# Patient Record
Sex: FEMALE | Race: WHITE | NOT HISPANIC OR LATINO | Employment: FULL TIME | URBAN - METROPOLITAN AREA
[De-identification: names, ages, dates, MRNs, and addresses within clinical notes are randomized per-mention and may not be internally consistent; named-entity substitution may affect disease eponyms.]

---

## 2021-08-15 ENCOUNTER — HOSPITAL ENCOUNTER (INPATIENT)
Facility: CLINIC | Age: 56
LOS: 9 days | Discharge: HOME | End: 2021-08-24
Attending: EMERGENCY MEDICINE | Admitting: INTERNAL MEDICINE
Payer: COMMERCIAL

## 2021-08-15 ENCOUNTER — APPOINTMENT (OUTPATIENT)
Dept: GENERAL RADIOLOGY | Facility: CLINIC | Age: 56
End: 2021-08-15
Attending: EMERGENCY MEDICINE
Payer: COMMERCIAL

## 2021-08-15 ENCOUNTER — APPOINTMENT (OUTPATIENT)
Dept: CT IMAGING | Facility: CLINIC | Age: 56
End: 2021-08-15
Attending: EMERGENCY MEDICINE
Payer: COMMERCIAL

## 2021-08-15 DIAGNOSIS — J96.01 ACUTE RESPIRATORY FAILURE WITH HYPOXIA (H): ICD-10-CM

## 2021-08-15 DIAGNOSIS — J18.9 PNEUMONIA OF BOTH LUNGS DUE TO INFECTIOUS ORGANISM, UNSPECIFIED PART OF LUNG: ICD-10-CM

## 2021-08-15 DIAGNOSIS — I50.9 ACUTE CONGESTIVE HEART FAILURE, UNSPECIFIED HEART FAILURE TYPE (H): ICD-10-CM

## 2021-08-15 DIAGNOSIS — I35.0 NONRHEUMATIC AORTIC VALVE STENOSIS: ICD-10-CM

## 2021-08-15 DIAGNOSIS — F10.10 ALCOHOL ABUSE: ICD-10-CM

## 2021-08-15 DIAGNOSIS — I35.0 AORTIC VALVE STENOSIS, ETIOLOGY OF CARDIAC VALVE DISEASE UNSPECIFIED: ICD-10-CM

## 2021-08-15 DIAGNOSIS — Q23.81 BICUSPID AORTIC VALVE: Primary | ICD-10-CM

## 2021-08-15 DIAGNOSIS — F10.929 ALCOHOL INTOXICATION, WITH UNSPECIFIED COMPLICATION (H): ICD-10-CM

## 2021-08-15 DIAGNOSIS — I35.0 AORTIC STENOSIS: ICD-10-CM

## 2021-08-15 DIAGNOSIS — Z95.4 S/P AORTIC VALVE REPLACEMENT WITH METALLIC VALVE: ICD-10-CM

## 2021-08-15 LAB
ALBUMIN SERPL-MCNC: 4.2 G/DL (ref 3.4–5)
ALP SERPL-CCNC: 97 U/L (ref 40–150)
ALT SERPL W P-5'-P-CCNC: 39 U/L (ref 0–50)
ANION GAP SERPL CALCULATED.3IONS-SCNC: 8 MMOL/L (ref 3–14)
AST SERPL W P-5'-P-CCNC: 39 U/L (ref 0–45)
ATRIAL RATE - MUSE: 140 BPM
BASE EXCESS BLDA CALC-SCNC: -5 MMOL/L (ref -9–1.8)
BASOPHILS # BLD AUTO: 0.1 10E3/UL (ref 0–0.2)
BASOPHILS NFR BLD AUTO: 1 %
BILIRUB SERPL-MCNC: 0.7 MG/DL (ref 0.2–1.3)
BUN SERPL-MCNC: 10 MG/DL (ref 7–30)
CALCIUM SERPL-MCNC: 8.8 MG/DL (ref 8.5–10.1)
CHLORIDE BLD-SCNC: 103 MMOL/L (ref 94–109)
CO2 SERPL-SCNC: 20 MMOL/L (ref 20–32)
CREAT SERPL-MCNC: 0.72 MG/DL (ref 0.52–1.04)
D DIMER PPP FEU-MCNC: 0.94 UG/ML FEU (ref 0–0.5)
DIASTOLIC BLOOD PRESSURE - MUSE: NORMAL MMHG
EOSINOPHIL # BLD AUTO: 0 10E3/UL (ref 0–0.7)
EOSINOPHIL NFR BLD AUTO: 0 %
ERYTHROCYTE [DISTWIDTH] IN BLOOD BY AUTOMATED COUNT: 12.7 % (ref 10–15)
ETHANOL SERPL-MCNC: 0.22 G/DL
GFR SERPL CREATININE-BSD FRML MDRD: >90 ML/MIN/1.73M2
GLUCOSE BLD-MCNC: 92 MG/DL (ref 70–99)
HCO3 BLD-SCNC: 21 MMOL/L (ref 21–28)
HCT VFR BLD AUTO: 44 % (ref 35–47)
HGB BLD-MCNC: 15.1 G/DL (ref 11.7–15.7)
HOLD SPECIMEN: NORMAL
IMM GRANULOCYTES # BLD: 0.1 10E3/UL
IMM GRANULOCYTES NFR BLD: 1 %
INTERPRETATION ECG - MUSE: NORMAL
LACTATE SERPL-SCNC: 3.1 MMOL/L (ref 0.7–2)
LIPASE SERPL-CCNC: 133 U/L (ref 73–393)
LYMPHOCYTES # BLD AUTO: 1.9 10E3/UL (ref 0.8–5.3)
LYMPHOCYTES NFR BLD AUTO: 18 %
MAGNESIUM SERPL-MCNC: 1.7 MG/DL (ref 1.6–2.3)
MAGNESIUM SERPL-MCNC: 2 MG/DL (ref 1.6–2.3)
MCH RBC QN AUTO: 32.7 PG (ref 26.5–33)
MCHC RBC AUTO-ENTMCNC: 34.3 G/DL (ref 31.5–36.5)
MCV RBC AUTO: 95 FL (ref 78–100)
MONOCYTES # BLD AUTO: 0.5 10E3/UL (ref 0–1.3)
MONOCYTES NFR BLD AUTO: 5 %
NEUTROPHILS # BLD AUTO: 8 10E3/UL (ref 1.6–8.3)
NEUTROPHILS NFR BLD AUTO: 75 %
NRBC # BLD AUTO: 0 10E3/UL
NRBC BLD AUTO-RTO: 0 /100
NT-PROBNP SERPL-MCNC: 3205 PG/ML (ref 0–900)
O2/TOTAL GAS SETTING VFR VENT: 40 %
OXYHGB MFR BLD: 94 % (ref 92–100)
P AXIS - MUSE: NORMAL DEGREES
PCO2 BLD: 43 MM HG (ref 35–45)
PH BLD: 7.3 [PH] (ref 7.35–7.45)
PHOSPHATE SERPL-MCNC: 5 MG/DL (ref 2.5–4.5)
PLATELET # BLD AUTO: 302 10E3/UL (ref 150–450)
PO2 BLD: 87 MM HG (ref 80–105)
POTASSIUM BLD-SCNC: 3.9 MMOL/L (ref 3.4–5.3)
PR INTERVAL - MUSE: 116 MS
PROT SERPL-MCNC: 7.5 G/DL (ref 6.8–8.8)
QRS DURATION - MUSE: 90 MS
QT - MUSE: 310 MS
QTC - MUSE: 473 MS
R AXIS - MUSE: 154 DEGREES
RBC # BLD AUTO: 4.62 10E6/UL (ref 3.8–5.2)
SARS-COV-2 RNA RESP QL NAA+PROBE: NEGATIVE
SODIUM SERPL-SCNC: 131 MMOL/L (ref 133–144)
SYSTOLIC BLOOD PRESSURE - MUSE: NORMAL MMHG
T AXIS - MUSE: 134 DEGREES
TROPONIN I SERPL-MCNC: 0.13 UG/L (ref 0–0.04)
TSH SERPL DL<=0.005 MIU/L-ACNC: 0.66 MU/L (ref 0.4–4)
VENTRICULAR RATE- MUSE: 140 BPM
WBC # BLD AUTO: 10.6 10E3/UL (ref 4–11)

## 2021-08-15 PROCEDURE — 83880 ASSAY OF NATRIURETIC PEPTIDE: CPT | Performed by: EMERGENCY MEDICINE

## 2021-08-15 PROCEDURE — 85041 AUTOMATED RBC COUNT: CPT | Performed by: EMERGENCY MEDICINE

## 2021-08-15 PROCEDURE — 96366 THER/PROPH/DIAG IV INF ADDON: CPT

## 2021-08-15 PROCEDURE — 84484 ASSAY OF TROPONIN QUANT: CPT | Performed by: EMERGENCY MEDICINE

## 2021-08-15 PROCEDURE — 200N000001 HC R&B ICU

## 2021-08-15 PROCEDURE — 258N000003 HC RX IP 258 OP 636: Performed by: INTERNAL MEDICINE

## 2021-08-15 PROCEDURE — 5A1935Z RESPIRATORY VENTILATION, LESS THAN 24 CONSECUTIVE HOURS: ICD-10-PCS | Performed by: INTERNAL MEDICINE

## 2021-08-15 PROCEDURE — HZ2ZZZZ DETOXIFICATION SERVICES FOR SUBSTANCE ABUSE TREATMENT: ICD-10-PCS | Performed by: SURGERY

## 2021-08-15 PROCEDURE — 999N000157 HC STATISTIC RCP TIME EA 10 MIN

## 2021-08-15 PROCEDURE — 84443 ASSAY THYROID STIM HORMONE: CPT | Performed by: EMERGENCY MEDICINE

## 2021-08-15 PROCEDURE — 99233 SBSQ HOSP IP/OBS HIGH 50: CPT | Performed by: INTERNAL MEDICINE

## 2021-08-15 PROCEDURE — 82805 BLOOD GASES W/O2 SATURATION: CPT | Performed by: INTERNAL MEDICINE

## 2021-08-15 PROCEDURE — 36415 COLL VENOUS BLD VENIPUNCTURE: CPT | Performed by: INTERNAL MEDICINE

## 2021-08-15 PROCEDURE — 258N000003 HC RX IP 258 OP 636: Performed by: SURGERY

## 2021-08-15 PROCEDURE — 83735 ASSAY OF MAGNESIUM: CPT | Performed by: EMERGENCY MEDICINE

## 2021-08-15 PROCEDURE — 96368 THER/DIAG CONCURRENT INF: CPT

## 2021-08-15 PROCEDURE — 71045 X-RAY EXAM CHEST 1 VIEW: CPT

## 2021-08-15 PROCEDURE — 96376 TX/PRO/DX INJ SAME DRUG ADON: CPT

## 2021-08-15 PROCEDURE — 99292 CRITICAL CARE ADDL 30 MIN: CPT

## 2021-08-15 PROCEDURE — 250N000009 HC RX 250: Performed by: EMERGENCY MEDICINE

## 2021-08-15 PROCEDURE — 250N000013 HC RX MED GY IP 250 OP 250 PS 637: Performed by: INTERNAL MEDICINE

## 2021-08-15 PROCEDURE — 94002 VENT MGMT INPAT INIT DAY: CPT

## 2021-08-15 PROCEDURE — 83690 ASSAY OF LIPASE: CPT | Performed by: EMERGENCY MEDICINE

## 2021-08-15 PROCEDURE — 999N000065 XR CHEST PORT 1 VIEW

## 2021-08-15 PROCEDURE — 85379 FIBRIN DEGRADATION QUANT: CPT | Performed by: EMERGENCY MEDICINE

## 2021-08-15 PROCEDURE — 87040 BLOOD CULTURE FOR BACTERIA: CPT | Performed by: EMERGENCY MEDICINE

## 2021-08-15 PROCEDURE — 250N000011 HC RX IP 250 OP 636: Performed by: SURGERY

## 2021-08-15 PROCEDURE — C9803 HOPD COVID-19 SPEC COLLECT: HCPCS

## 2021-08-15 PROCEDURE — 87635 SARS-COV-2 COVID-19 AMP PRB: CPT | Performed by: EMERGENCY MEDICINE

## 2021-08-15 PROCEDURE — 84100 ASSAY OF PHOSPHORUS: CPT | Performed by: INTERNAL MEDICINE

## 2021-08-15 PROCEDURE — 83735 ASSAY OF MAGNESIUM: CPT | Performed by: INTERNAL MEDICINE

## 2021-08-15 PROCEDURE — 93005 ELECTROCARDIOGRAM TRACING: CPT

## 2021-08-15 PROCEDURE — 71275 CT ANGIOGRAPHY CHEST: CPT

## 2021-08-15 PROCEDURE — 250N000011 HC RX IP 250 OP 636: Performed by: EMERGENCY MEDICINE

## 2021-08-15 PROCEDURE — 250N000009 HC RX 250: Performed by: INTERNAL MEDICINE

## 2021-08-15 PROCEDURE — 258N000003 HC RX IP 258 OP 636: Performed by: EMERGENCY MEDICINE

## 2021-08-15 PROCEDURE — 96365 THER/PROPH/DIAG IV INF INIT: CPT | Mod: 59

## 2021-08-15 PROCEDURE — 31500 INSERT EMERGENCY AIRWAY: CPT

## 2021-08-15 PROCEDURE — 36415 COLL VENOUS BLD VENIPUNCTURE: CPT | Performed by: EMERGENCY MEDICINE

## 2021-08-15 PROCEDURE — 96375 TX/PRO/DX INJ NEW DRUG ADDON: CPT

## 2021-08-15 PROCEDURE — 250N000011 HC RX IP 250 OP 636

## 2021-08-15 PROCEDURE — 51702 INSERT TEMP BLADDER CATH: CPT

## 2021-08-15 PROCEDURE — 80053 COMPREHEN METABOLIC PANEL: CPT | Performed by: EMERGENCY MEDICINE

## 2021-08-15 PROCEDURE — 250N000011 HC RX IP 250 OP 636: Performed by: INTERNAL MEDICINE

## 2021-08-15 PROCEDURE — 82077 ASSAY SPEC XCP UR&BREATH IA: CPT | Performed by: EMERGENCY MEDICINE

## 2021-08-15 PROCEDURE — 83605 ASSAY OF LACTIC ACID: CPT | Performed by: EMERGENCY MEDICINE

## 2021-08-15 PROCEDURE — 99291 CRITICAL CARE FIRST HOUR: CPT | Mod: 25

## 2021-08-15 RX ORDER — GABAPENTIN 250 MG/5ML
600 SOLUTION ORAL EVERY 8 HOURS
Status: DISCONTINUED | OUTPATIENT
Start: 2021-08-19 | End: 2021-08-16

## 2021-08-15 RX ORDER — HEPARIN SODIUM 10000 [USP'U]/100ML
0-5000 INJECTION, SOLUTION INTRAVENOUS CONTINUOUS
Status: DISCONTINUED | OUTPATIENT
Start: 2021-08-15 | End: 2021-08-15

## 2021-08-15 RX ORDER — PROCHLORPERAZINE 25 MG
25 SUPPOSITORY, RECTAL RECTAL EVERY 12 HOURS PRN
Status: DISCONTINUED | OUTPATIENT
Start: 2021-08-15 | End: 2021-08-19

## 2021-08-15 RX ORDER — ONDANSETRON 2 MG/ML
INJECTION INTRAMUSCULAR; INTRAVENOUS
Status: COMPLETED
Start: 2021-08-15 | End: 2021-08-15

## 2021-08-15 RX ORDER — PHENYLEPHRINE HCL IN 0.9% NACL 50MG/250ML
.5-1.25 PLASTIC BAG, INJECTION (ML) INTRAVENOUS CONTINUOUS
Status: DISCONTINUED | OUTPATIENT
Start: 2021-08-15 | End: 2021-08-17

## 2021-08-15 RX ORDER — AMOXICILLIN 250 MG
1 CAPSULE ORAL 2 TIMES DAILY PRN
Status: DISCONTINUED | OUTPATIENT
Start: 2021-08-15 | End: 2021-08-19

## 2021-08-15 RX ORDER — LORAZEPAM 2 MG/ML
2 INJECTION INTRAMUSCULAR ONCE
Status: DISCONTINUED | OUTPATIENT
Start: 2021-08-15 | End: 2021-08-17

## 2021-08-15 RX ORDER — DEXMEDETOMIDINE HYDROCHLORIDE 4 UG/ML
.2-1.2 INJECTION, SOLUTION INTRAVENOUS CONTINUOUS
Status: DISCONTINUED | OUTPATIENT
Start: 2021-08-15 | End: 2021-08-17

## 2021-08-15 RX ORDER — IOPAMIDOL 755 MG/ML
56 INJECTION, SOLUTION INTRAVASCULAR ONCE
Status: COMPLETED | OUTPATIENT
Start: 2021-08-15 | End: 2021-08-15

## 2021-08-15 RX ORDER — LANOLIN ALCOHOL/MO/W.PET/CERES
100 CREAM (GRAM) TOPICAL DAILY
Status: DISCONTINUED | OUTPATIENT
Start: 2021-08-22 | End: 2021-08-18

## 2021-08-15 RX ORDER — DULOXETIN HYDROCHLORIDE 20 MG/1
20 CAPSULE, DELAYED RELEASE ORAL DAILY
COMMUNITY

## 2021-08-15 RX ORDER — GABAPENTIN 250 MG/5ML
300 SOLUTION ORAL EVERY 8 HOURS
Status: DISCONTINUED | OUTPATIENT
Start: 2021-08-21 | End: 2021-08-16

## 2021-08-15 RX ORDER — IPRATROPIUM BROMIDE AND ALBUTEROL SULFATE 2.5; .5 MG/3ML; MG/3ML
3 SOLUTION RESPIRATORY (INHALATION) EVERY 4 HOURS PRN
Status: DISCONTINUED | OUTPATIENT
Start: 2021-08-15 | End: 2021-08-19

## 2021-08-15 RX ORDER — NICOTINE POLACRILEX 4 MG
15-30 LOZENGE BUCCAL
Status: DISCONTINUED | OUTPATIENT
Start: 2021-08-15 | End: 2021-08-19

## 2021-08-15 RX ORDER — FOLIC ACID 5 MG/ML
1 INJECTION, SOLUTION INTRAMUSCULAR; INTRAVENOUS; SUBCUTANEOUS ONCE
Status: DISCONTINUED | OUTPATIENT
Start: 2021-08-15 | End: 2021-08-15

## 2021-08-15 RX ORDER — FOLIC ACID 5 MG/ML
1 INJECTION, SOLUTION INTRAMUSCULAR; INTRAVENOUS; SUBCUTANEOUS DAILY
Status: COMPLETED | OUTPATIENT
Start: 2021-08-16 | End: 2021-08-17

## 2021-08-15 RX ORDER — ONDANSETRON 2 MG/ML
4 INJECTION INTRAMUSCULAR; INTRAVENOUS ONCE
Status: COMPLETED | OUTPATIENT
Start: 2021-08-15 | End: 2021-08-15

## 2021-08-15 RX ORDER — LORAZEPAM 0.5 MG/1
0.5 TABLET ORAL EVERY 6 HOURS PRN
Status: ON HOLD | COMMUNITY
End: 2021-08-24

## 2021-08-15 RX ORDER — DEXTROSE MONOHYDRATE 25 G/50ML
25-50 INJECTION, SOLUTION INTRAVENOUS
Status: DISCONTINUED | OUTPATIENT
Start: 2021-08-15 | End: 2021-08-19

## 2021-08-15 RX ORDER — AMOXICILLIN 250 MG
2 CAPSULE ORAL 2 TIMES DAILY PRN
Status: DISCONTINUED | OUTPATIENT
Start: 2021-08-15 | End: 2021-08-19

## 2021-08-15 RX ORDER — GABAPENTIN 250 MG/5ML
1200 SOLUTION ORAL ONCE
Status: COMPLETED | OUTPATIENT
Start: 2021-08-15 | End: 2021-08-15

## 2021-08-15 RX ORDER — GABAPENTIN 250 MG/5ML
900 SOLUTION ORAL EVERY 8 HOURS
Status: DISCONTINUED | OUTPATIENT
Start: 2021-08-16 | End: 2021-08-16

## 2021-08-15 RX ORDER — CLONIDINE HYDROCHLORIDE 0.1 MG/1
0.1 TABLET ORAL EVERY 8 HOURS
Status: DISCONTINUED | OUTPATIENT
Start: 2021-08-15 | End: 2021-08-15

## 2021-08-15 RX ORDER — PROPOFOL 10 MG/ML
5-75 INJECTION, EMULSION INTRAVENOUS CONTINUOUS
Status: DISCONTINUED | OUTPATIENT
Start: 2021-08-15 | End: 2021-08-15

## 2021-08-15 RX ORDER — PROPOFOL 10 MG/ML
5-75 INJECTION, EMULSION INTRAVENOUS CONTINUOUS
Status: DISCONTINUED | OUTPATIENT
Start: 2021-08-15 | End: 2021-08-17

## 2021-08-15 RX ORDER — ALBUTEROL SULFATE 90 UG/1
6 AEROSOL, METERED RESPIRATORY (INHALATION) EVERY 4 HOURS PRN
Status: DISCONTINUED | OUTPATIENT
Start: 2021-08-15 | End: 2021-08-16 | Stop reason: CLARIF

## 2021-08-15 RX ORDER — FLUMAZENIL 0.1 MG/ML
0.2 INJECTION, SOLUTION INTRAVENOUS
Status: DISCONTINUED | OUTPATIENT
Start: 2021-08-15 | End: 2021-08-17

## 2021-08-15 RX ORDER — FENTANYL CITRATE 50 UG/ML
100 INJECTION, SOLUTION INTRAMUSCULAR; INTRAVENOUS ONCE
Status: COMPLETED | OUTPATIENT
Start: 2021-08-15 | End: 2021-08-15

## 2021-08-15 RX ORDER — LANOLIN ALCOHOL/MO/W.PET/CERES
100 CREAM (GRAM) TOPICAL 3 TIMES DAILY
Status: DISCONTINUED | OUTPATIENT
Start: 2021-08-17 | End: 2021-08-19

## 2021-08-15 RX ORDER — FUROSEMIDE 10 MG/ML
40 INJECTION INTRAMUSCULAR; INTRAVENOUS ONCE
Status: COMPLETED | OUTPATIENT
Start: 2021-08-15 | End: 2021-08-15

## 2021-08-15 RX ORDER — LORAZEPAM 2 MG/ML
1 INJECTION INTRAMUSCULAR ONCE
Status: COMPLETED | OUTPATIENT
Start: 2021-08-15 | End: 2021-08-15

## 2021-08-15 RX ORDER — ONDANSETRON 4 MG/1
4 TABLET, ORALLY DISINTEGRATING ORAL EVERY 6 HOURS PRN
Status: DISCONTINUED | OUTPATIENT
Start: 2021-08-15 | End: 2021-08-19

## 2021-08-15 RX ORDER — ONDANSETRON 2 MG/ML
4 INJECTION INTRAMUSCULAR; INTRAVENOUS EVERY 6 HOURS PRN
Status: DISCONTINUED | OUTPATIENT
Start: 2021-08-15 | End: 2021-08-19

## 2021-08-15 RX ORDER — CHLORHEXIDINE GLUCONATE ORAL RINSE 1.2 MG/ML
15 SOLUTION DENTAL EVERY 12 HOURS
Status: DISCONTINUED | OUTPATIENT
Start: 2021-08-15 | End: 2021-08-16 | Stop reason: CLARIF

## 2021-08-15 RX ORDER — MULTIPLE VITAMINS W/ MINERALS TAB 9MG-400MCG
1 TAB ORAL DAILY
Status: DISCONTINUED | OUTPATIENT
Start: 2021-08-16 | End: 2021-08-19

## 2021-08-15 RX ORDER — PIPERACILLIN SODIUM, TAZOBACTAM SODIUM 4; .5 G/20ML; G/20ML
4.5 INJECTION, POWDER, LYOPHILIZED, FOR SOLUTION INTRAVENOUS ONCE
Status: COMPLETED | OUTPATIENT
Start: 2021-08-15 | End: 2021-08-15

## 2021-08-15 RX ORDER — FOLIC ACID 1 MG/1
1 TABLET ORAL DAILY
Status: DISCONTINUED | OUTPATIENT
Start: 2021-08-18 | End: 2021-08-19

## 2021-08-15 RX ORDER — PANTOPRAZOLE SODIUM 40 MG/1
40 TABLET, DELAYED RELEASE ORAL
Status: DISCONTINUED | OUTPATIENT
Start: 2021-08-16 | End: 2021-08-19

## 2021-08-15 RX ORDER — ETOMIDATE 2 MG/ML
20 INJECTION INTRAVENOUS ONCE
Status: COMPLETED | OUTPATIENT
Start: 2021-08-15 | End: 2021-08-15

## 2021-08-15 RX ORDER — ACETAMINOPHEN 325 MG/1
650 TABLET ORAL EVERY 4 HOURS PRN
Status: DISCONTINUED | OUTPATIENT
Start: 2021-08-15 | End: 2021-08-19

## 2021-08-15 RX ORDER — PROCHLORPERAZINE MALEATE 10 MG
10 TABLET ORAL EVERY 6 HOURS PRN
Status: DISCONTINUED | OUTPATIENT
Start: 2021-08-15 | End: 2021-08-19

## 2021-08-15 RX ORDER — FUROSEMIDE 10 MG/ML
20 INJECTION INTRAMUSCULAR; INTRAVENOUS ONCE
Status: COMPLETED | OUTPATIENT
Start: 2021-08-15 | End: 2021-08-15

## 2021-08-15 RX ORDER — LOSARTAN POTASSIUM 50 MG/1
50 TABLET ORAL DAILY
Status: ON HOLD | COMMUNITY
End: 2021-08-24

## 2021-08-15 RX ORDER — ALBUTEROL SULFATE 90 UG/1
6 AEROSOL, METERED RESPIRATORY (INHALATION) EVERY 4 HOURS
Status: DISCONTINUED | OUTPATIENT
Start: 2021-08-15 | End: 2021-08-16 | Stop reason: CLARIF

## 2021-08-15 RX ORDER — HALOPERIDOL 5 MG/ML
5 INJECTION INTRAMUSCULAR EVERY 4 HOURS PRN
Status: DISCONTINUED | OUTPATIENT
Start: 2021-08-15 | End: 2021-08-17

## 2021-08-15 RX ORDER — GABAPENTIN 250 MG/5ML
100 SOLUTION ORAL EVERY 8 HOURS
Status: DISCONTINUED | OUTPATIENT
Start: 2021-08-23 | End: 2021-08-16

## 2021-08-15 RX ORDER — METOPROLOL TARTRATE 1 MG/ML
5 INJECTION, SOLUTION INTRAVENOUS EVERY 6 HOURS PRN
Status: DISCONTINUED | OUTPATIENT
Start: 2021-08-15 | End: 2021-08-19

## 2021-08-15 RX ORDER — BISACODYL 10 MG
10 SUPPOSITORY, RECTAL RECTAL DAILY PRN
Status: DISCONTINUED | OUTPATIENT
Start: 2021-08-15 | End: 2021-08-19

## 2021-08-15 RX ADMIN — CHLORHEXIDINE GLUCONATE 15 ML: 1.2 SOLUTION ORAL at 19:25

## 2021-08-15 RX ADMIN — IOPAMIDOL 56 ML: 755 INJECTION, SOLUTION INTRAVENOUS at 15:43

## 2021-08-15 RX ADMIN — PHENYLEPHRINE HYDROCHLORIDE 0.5 MCG/KG/MIN: 10 INJECTION, SOLUTION INTRAVENOUS at 22:06

## 2021-08-15 RX ADMIN — MIDAZOLAM HYDROCHLORIDE 2 MG: 1 INJECTION, SOLUTION INTRAMUSCULAR; INTRAVENOUS at 17:18

## 2021-08-15 RX ADMIN — PROPOFOL 30 MCG/KG/MIN: 10 INJECTION, EMULSION INTRAVENOUS at 15:21

## 2021-08-15 RX ADMIN — THIAMINE HYDROCHLORIDE 200 MG: 100 INJECTION, SOLUTION INTRAMUSCULAR; INTRAVENOUS at 22:23

## 2021-08-15 RX ADMIN — SODIUM CHLORIDE 1000 ML: 9 INJECTION, SOLUTION INTRAVENOUS at 14:25

## 2021-08-15 RX ADMIN — DEXMEDETOMIDINE HYDROCHLORIDE 0.2 MCG/KG/HR: 400 INJECTION INTRAVENOUS at 18:39

## 2021-08-15 RX ADMIN — PIPERACILLIN SODIUM AND TAZOBACTAM SODIUM 4.5 G: 4; .5 INJECTION, POWDER, LYOPHILIZED, FOR SOLUTION INTRAVENOUS at 16:49

## 2021-08-15 RX ADMIN — PROPOFOL 40 MCG/KG/MIN: 10 INJECTION, EMULSION INTRAVENOUS at 18:15

## 2021-08-15 RX ADMIN — SODIUM CHLORIDE 80 ML: 9 INJECTION, SOLUTION INTRAVENOUS at 15:44

## 2021-08-15 RX ADMIN — HALOPERIDOL LACTATE 5 MG: 5 INJECTION, SOLUTION INTRAMUSCULAR at 21:40

## 2021-08-15 RX ADMIN — LORAZEPAM 1 MG: 2 INJECTION INTRAMUSCULAR; INTRAVENOUS at 14:31

## 2021-08-15 RX ADMIN — LORAZEPAM 1 MG: 2 INJECTION INTRAMUSCULAR; INTRAVENOUS at 15:08

## 2021-08-15 RX ADMIN — ONDANSETRON 4 MG: 2 INJECTION INTRAMUSCULAR; INTRAVENOUS at 14:26

## 2021-08-15 RX ADMIN — FENTANYL CITRATE 100 MCG: 50 INJECTION, SOLUTION INTRAMUSCULAR; INTRAVENOUS at 16:56

## 2021-08-15 RX ADMIN — PROPOFOL 50 MCG/KG/MIN: 10 INJECTION, EMULSION INTRAVENOUS at 19:29

## 2021-08-15 RX ADMIN — HEPARIN SODIUM 700 UNITS/HR: 10000 INJECTION, SOLUTION INTRAVENOUS at 16:38

## 2021-08-15 RX ADMIN — FOLIC ACID: 5 INJECTION, SOLUTION INTRAMUSCULAR; INTRAVENOUS; SUBCUTANEOUS at 19:22

## 2021-08-15 RX ADMIN — GABAPENTIN 1200 MG: 250 SOLUTION ORAL at 22:25

## 2021-08-15 RX ADMIN — ETOMIDATE 20 MG: 2 INJECTION, SOLUTION INTRAVENOUS at 15:15

## 2021-08-15 RX ADMIN — FUROSEMIDE 40 MG: 10 INJECTION, SOLUTION INTRAVENOUS at 15:08

## 2021-08-15 RX ADMIN — FUROSEMIDE 20 MG: 10 INJECTION, SOLUTION INTRAVENOUS at 16:31

## 2021-08-15 RX ADMIN — ENOXAPARIN SODIUM 40 MG: 40 INJECTION SUBCUTANEOUS at 20:48

## 2021-08-15 RX ADMIN — SUCCINYLCHOLINE CHLORIDE 150 MG: 20 INJECTION, SOLUTION INTRAMUSCULAR; INTRAVENOUS; PARENTERAL at 15:16

## 2021-08-15 ASSESSMENT — ENCOUNTER SYMPTOMS
FEVER: 0
DIARRHEA: 0
VOMITING: 0
ABDOMINAL PAIN: 0
SHORTNESS OF BREATH: 1
PALPITATIONS: 1
COUGH: 0
NAUSEA: 0

## 2021-08-15 ASSESSMENT — MIFFLIN-ST. JEOR
SCORE: 1157.88
SCORE: 1165.88

## 2021-08-15 ASSESSMENT — ACTIVITIES OF DAILY LIVING (ADL): ADLS_ACUITY_SCORE: 22

## 2021-08-15 NOTE — ED PROVIDER NOTES
"  History   Chief Complaint:  Palpitations and Shortness of Breath     HPI   Mayda Lezama is a 55 year old female with a history of anxiety, aortic stenosis, and hypertension who presents with palpitations and shortness of breath. Last night, she was out on the boat and had several drinks. Following this, she developed palpitations and shortness of breath. She states that she feels anxious due to her symptoms and feels like she is unable to calm down.  She also had some drinks today.  Per her  she has had a productive cough over the last couple of days as well. She denies fever and chest pain. No nausea, abdominal pain, vomiting, or diarrhea. She is not vaccinated for COVID.     Review of Systems   Constitutional: Negative for fever.   Respiratory: Positive for shortness of breath. Negative for cough.    Cardiovascular: Positive for palpitations. Negative for chest pain.   Gastrointestinal: Negative for abdominal pain, diarrhea, nausea and vomiting.   All other systems reviewed and are negative.    Allergies:  Metoclopramide    Medications:  Ativan  Losartan   Cymbalta     Past Medical History:    Migraines  Hypertension  Aortic stenosis  Anxiety  Tobacco use disorder     Past Surgical History:    Breast augmentation      Family History:    Hypertension  Lung cancer  Diabetes  Thyroid cancer     Social History:  Accompanied by      Smoker  Alcohol use: yes    Physical Exam     Patient Vitals for the past 24 hrs:   BP Temp Temp src Pulse SpO2 Height Weight   08/15/21 1356 (!) 161/95 98.1  F (36.7  C) Temporal 114 92 % 1.651 m (5' 5\") 56.7 kg (125 lb)     Physical Exam  Nursing note and vitals reviewed.  Constitutional:  Anxious. Smells of alcohol. Oriented to person, place, and time. Cooperative.   HENT:   Nose:    Nose normal.   Mouth/Throat:   Mucous membranes are normal.   Eyes:    Conjunctivae normal and EOM are normal.      Pupils are equal, round, and reactive to light.   Neck: "    Trachea normal.   Cardiovascular:  Tachycardic rate, regular rhythm, normal heart sounds and normal pulses. No murmur heard.  Pulmonary/Chest:  Effort normal and breath sounds normal.   Abdominal:   Soft. Normal appearance and bowel sounds are normal.      There is no tenderness.      There is no rebound and no CVA tenderness.   Musculoskeletal:  Extremities atraumatic x 4.   Lymphadenopathy:  No cervical adenopathy.   Neurological:   Alert and oriented to person, place, and time. Normal strength.      No cranial nerve deficit or sensory deficit. GCS eye subscore is 4. GCS verbal subscore is 5. GCS motor subscore is 6.   Skin:    Skin is intact. No rash noted.   Psychiatric:   Anxious.    Emergency Department Course   ECG  ECG taken at 1456, ECG read at 1500  Sinus tachycardia  Left posterior fascicular block  Left ventricular hypertrophy with repolarization abnormality   No prior for comparison  Rate 140 bpm. AK interval 116 ms. QRS duration 90 ms. QT/QTc 310/473 ms. P-R-T axes * 154 134.     Imaging:  CT Chest Pulmonary Embolism w contrast   IMPRESSION:   1. No evidence pulmonary embolism.   2. Small bilateral pleural effusions and associated basilar   atelectasis/consolidation.   3. Multiple small patchy and nodular pulmonary opacities, worrisome   for infection. Recommend follow-up exam after symptoms   improvement/resolution.   4. Cardiomegaly and scattered areas of interlobular septal thickening,   could be due to underlying pulmonary edema. Also, reflux of contrast   into the IVC and hepatic veins, suggesting right heart dysfunction  Reading per radiology    XR Chest Port 1 view 1531  IMPRESSION: Portal AP view of the chest was obtained.   Cardiomediastinal silhouette is within normal limits. Bibasilar   pulmonary opacities, could represent atelectasis versus infection. No   significant pleural effusion or pneumothorax  Reading per radiology    XR Chest Port 1 view 1551  Impression: Portable chest. Patchy  airspace opacities in the mid to   lower left lung are now superimposed on the background of interstitial   changes seen on prior exam. Findings are concerning for pneumonia.   Endotracheal tube terminates approximately 3 cm above the sophia in   good position. Nasogastric tube extends below the left hemidiaphragm   overlying the region of the stomach. No pneumothorax or pleural   effusions  Reading per radiology    Laboratory:  CBC: WBC 10.6, HGB 15.1,    CMP: sodium 131 (L) o/w WNL (Creatinine 0.72)     Troponin (Collected 1427): 0.127 (H!)  Ddimer: 0.94 (H)  TSH with free T4 reflex: 0.66  Lactic acid (result time 1614) 3.1 (H)     Nt probnp inpatient: 3,205 (H)  Magnesium: 2.0    Lipase: 133    Alcohol ethyl: 0.22 (H)    Blood cultures: Pending    Symptomatic Influenza A/B & SARS-CoV2 (COVID19) Virus PCR Multiplex: negative      [facility]    -Intubation    Date/Time: 8/15/2021 3:17 PM  Performed by: Crescencio Cordoba MD  Authorized by: Crescencio Cordoba MD       PRE-PROCEDURE DETAILS     Pretreatment medications:  None    Paralytics:  Succinylcholine      PROCEDURE DETAILS     Preoxygenation:  BiPAP    CPR in progress: no      Intubation method:  Oral    Oral intubation technique:  Direct    Laryngoscope blade:  Mac 4    Tube size (mm):  7.5    Tube type:  Cuffed    Number of attempts:  1    Cricoid pressure: no      Tube visualized through cords: yes      PLACEMENT ASSESSMENT     ETT to lip:  23    Placement verification: CXR verification, direct visualization, equal breath sounds, ETCO2 detector and tube exhalation      CXR findings:  ETT in proper place  PROCEDURE   Patient Tolerance:  Patient tolerated the procedure well with no immediate complications        Emergency Department Course:  Reviewed:  I reviewed nursing notes, vitals, past medical history and care everywhere    Assessments:  1425 I obtained history and examined the patient as noted above.   1500    I was  notified that patient was in respiratory failure. I rechecked the patient. RT called.   1504    Patient placed on BiPAP  1512    Portable chest Xray obtained.   1516    Patient intubated.  1525    Portable chest Xray obtained.   1539    I updated her .     Consults:   1638 I spoke to Dr. Hernandez of the ICU.     Interventions:  1425 NS 1L IV Bolus  1426 Zofran, 4 mg, IV  1431 Ativan, 1 mg, IV  1508 Lasix, 40 mg, IV  1508 Ativan, 1 mg, IV  1521 Propofol, 30 mcg/kg/min, IV  1537 Propofol, 40 mcg/kg/min, IV  1607 Propofol, 50 mcg/kg/min, IV  1620 Propofol, 80 mcg/kg/min, IV  1631 Lasix, 20 mg, IV  1638 heparin 25,000 units in 0.45% NaCl, 700 units/hr, IV  1640 heparin loading dose, 3,400 units, IV  1649 Zosyn, 4.5 g, IV  1650 Propofol, 75 mcg/kg/min, IV  1656 Fentanyl, 100 mcg, IV  1703 Propofol, 70 mcg/kg/min, IV  1717 Propofol, 50 mcg/kg/min, IV    Disposition:  The patient was admitted to the hospital under the care of Dr. Hernandez.     Impression & Plan   Medical Decision Making:  This is a 55-year-old female came in for further evaluation of anxiety and shortness of breath.  Upon arrival here, she was quite anxious and tachycardic, but she had normal lung sounds and was not in respiratory distress.  My initial thought was that she might be dehydrated or have alcohol withdrawal along with anxiety.  However I also consider other causes such as cardiac ischemia, pulmonary embolism, and possibly issues with aortic stenosis or CHF, along with infectious causes such as pneumonia.  I also considered the possibility of dehydration from her alcohol intake and being out in the sun.  Therefore she was provided IV fluids and IV Ativan, and I proceeded with the above work-up including an EKG and blood work.  Unfortunately, she then started to worsen with regards to her breathing and anxiety. On reexam, she was tachypneic with coarse breath sounds, and she was not able to tolerate a facemask for oxygen delivery. She was  subsequently moved to one of our stabilization rooms and she was provided IV Lasix and started on BiPAP. She was also given another dose of IV Ativan.  It became clear quite quickly that she was not going to tolerate BiPAP either, and I felt it was best to intubate her, which I then did per the above procedure note. Her chest x-ray at this point was consistent with pulmonary edema as well. She then had the CT scan obtained of her chest to further evaluate for pulmonary embolism or other pathology such as pneumonia, CHF, or COVID-19 pneumonia. She was provided another IV dose of Lasix, and she required Ativan and fentanyl for further sedation. She was also provided Zosyn due to my concern for pneumonia and sepsis, although I suspect all of her respiratory distress is likely secondary to pulmonary edema.  She also was started on IV heparin, given the elevated troponin.  I subsequently spoke with Dr. Hernandez, the ICU physician, who will be admitting her.    Critical Care Time: was 40 minutes for this patient excluding procedures    Covid-19  Mayda Lezama was evaluated during a global COVID-19 pandemic, which necessitated consideration that the patient might be at risk for infection with the SARS-CoV-2 virus that causes COVID-19.   Applicable protocols for evaluation were followed during the patient's care. COVID-19 was considered as part of the patient's evaluation. The plan for testing is:  a test was obtained during this visit.    Diagnosis:    ICD-10-CM    1. Acute respiratory failure with hypoxia (H)  J96.01    2. Acute congestive heart failure, unspecified heart failure type (H)  I50.9    3. Alcohol intoxication, with unspecified complication (H)  F10.929    4. Alcohol abuse  F10.10    5. Pneumonia of both lungs due to infectious organism, unspecified part of lung  J18.9      Scribe Disclosure:  I, Lissa Holman, am serving as a scribe at 2:06 PM on 8/15/2021 to document services personally performed by  Crescencio Cordoba MD based on my observations and the provider's statements to me.            Crescencio Cordoba MD  08/15/21 1823

## 2021-08-15 NOTE — PHARMACY-ADMISSION MEDICATION HISTORY
Pharmacy Medication History  Admission medication history interview status for the 8/15/2021  admission is complete. See EPIC admission navigator for prior to admission medications     Location of Interview: Phone  Medication history sources: Patient's family/friend (spouse- Paolo), Surescripts and Care Everywhere    Significant changes made to the medication list:  Added:  - cymbalta  - ativan  - losartan    In the past week, patient estimated taking medication this percent of the time: N/A    Additional medication history information:   The patient was prescribed cymbalta, losartan, and ativan on 7/22 but has not started these medications due to a planned trip to [place] and wanting to wait until her return from [place].     Medication reconciliation completed by provider prior to medication history? No    Time spent in this activity: 6 minutes    Prior to Admission medications    Medication Sig Last Dose Taking? Auth Provider   DULoxetine (CYMBALTA) 20 MG capsule Take 20 mg by mouth daily   at has not started yet Yes Unknown, Entered By History   LORazepam (ATIVAN) 0.5 MG tablet Take 0.5 mg by mouth every 6 hours as needed for anxiety (or flying)   at PRN Yes Unknown, Entered By History   losartan (COZAAR) 50 MG tablet Take 50 mg by mouth daily  at has not started yet Yes Unknown, Entered By History       The information provided in this note is only as accurate as the sources available at the time of update(s)

## 2021-08-15 NOTE — ED NOTES
Patient struggling with secretions. Propofol maxed at 80mcg after discussion with MD and ED pharmacist. Other others were added for sedation as needed. Patient tolerating at this time.

## 2021-08-15 NOTE — ED NOTES
DATE:  8/15/2021   TIME OF RECEIPT FROM LAB:  9905  LAB TEST:  Troponin  LAB VALUE:  0.127  RESULTS GIVEN WITH READ-BACK TO (PROVIDER):  Crescencio Cordoba MD  TIME LAB VALUE REPORTED TO PROVIDER:   8598

## 2021-08-15 NOTE — H&P
Critical Care  Note      08/15/2021    Name: Mayda Lezama MRN#: [id number]   Age: 55 year old YOB: 1965     Hsptl Day# 1  ICU DAY # 1    MV DAY # 1             Problem List:   Active Problems:    Acute respiratory failure with hypoxia (H)           Summary/Hospital Course:     Italia Lezama is a 55-year-old female with a history of hypertension, aortic stenosis, and anxiety who presented to the ED for shortness of breath and palpitations.  Per ED report, the patient was drinking throughout the day 8/14 and started to have palpitations in the evening.  She continued to have palpitations into the following morning (8/15), and with developing dyspnea and anxiety, presented to the ED.  She has also had a productive cough over the last couple of days and is not COVID vaccinated.  In the ED, the patient received 1 L NS and rapidly developed worsening dyspnea and hypoxia to the 80s, and HR to 140s.  She was emergently intubated for ARF and given total of 60 mg furosemide.  Chest x-ray showed patchy interstitial opacities.  Labs were remarkable for BNP elevated to 3,200, d-dimer of 0.94, troponin to 0.127, grossly normal CBC and BMP aside from Na+ of 131, and EtOH of 0.22.  CTPE and COVID negative.      Assessment and plan :     Mayda Lezama IS a 55 year old female admitted on 8/15/2021 for acute respiratory failure.   I have personally reviewed the daily labs, imaging studies, cultures and discussed the case with referring physician and consulting physicians.     My assessment and plan by system for this patient is as follows:    Neurology/Psychiatry:   # Anxiety  # Alcohol intoxication  Sedated on propofol, also received versed 2 mg x1.  Given softer pressures in the 90s/60s, will transition to precedex.  Plan  - Wean Propofol and replace with Precedex; wean sedation as able  - Acetaminophen PRN  - Hold PTA duloxetine and lorazepam  - Delirium precautions  - CIWA protocol      Cardiovascular:   #  Acute Congestive Heart Failure  # Aortic Stenosis  # Tachycardia  # Hypotension  EKG obtained in the ED showed sinus tach, troponin to 0.127, likely stress related.  BNP to 3,200.  Currently rate of 90s bpm with pressures in the 90s/60s.  Plan  - Echocardiogram tomorrow  - Furosemide 60 mg given in the ED, hold further diuresis  - Hold PTA losartan  - Monitor I/Os      Pulmonary/Ventilator Management:   Intubated. Currently saturating 100% on CMV/AC with FiO2 40%, PEEP 5 mmHg,  mL, RR 16.  Plan  - Continue mechanical ventilation, wean as tolerated  - Daily SAT/SBT  - ABG today      GI and Nutrition:   No issues.  Plan  -NPO      Renal/Fluids/Electrolytes:   # Lactic Acidosis  # Alcohol intoxication  # Acute Congestive Heart Failure  Lactate in the ED to 3.1, likely to due acute CHF. ABG with pH of 7.30.  BMP wnl and creatinine of 0.72.  Plan  - Banana bag 1 L at 75 mL/hr  - monitor function and electrolytes as needed with replacement per ICU protocols.   - generally avoid nephrotoxic agents  - Continue Lassiter  - Follow I/O's as appropriate  - BMP, lactate AM      Infectious Disease:   No issues. Was given one dose of Zosyn in the ED to cover for possible sepsis, but patient is afebrile, normal white count, and without current signs of infection.  Plan  - No antibiotics, reassess as needed      Endocrine:   No issues.  Plan  - ICU insulin protocol, goal sugar <180      Hematology/Oncology:   No issues.  Plan  - CBC AM      IV/Access:   Venous access - pIV x3    ICU Prophylaxis:   1. DVT: LMWH and mechanical  2. VAP: HOB 30 degrees, chlorhexidine rinse  3. Stress Ulcer: PPI  4. Restraints: Nonviolent soft two point restraints required and necessary for patient safety and continued cares and good effect as patient continues to pull at necessary lines, tubes despite education and distraction. Will readdress daily.   5. Wound care: Per unit protocol  6. Feeding: NPO  7. Family Update: Updated  Paolo in the  room.  8. Disposition - ICU    Key goals for next 24 hours:     1. Echocardiogram tomorrow   2. Optimize sedation  3. Monitor hemodynamic status         Medical History:     Past Medical History:   Diagnosis Date     Migraines      No past surgical history on file.  Social History     Socioeconomic History     Marital status:      Spouse name: Not on file     Number of children: Not on file     Years of education: Not on file     Highest education level: Not on file   Occupational History     Not on file   Tobacco Use     Smoking status: Current Every Day Smoker     Packs/day: 0.50   Substance and Sexual Activity     Alcohol use: Yes     Drug use: No     Sexual activity: Not on file   Other Topics Concern     Not on file   Social History Narrative     Not on file     Social Determinants of Health     Financial Resource Strain:      Difficulty of Paying Living Expenses:    Food Insecurity:      Worried About Running Out of Food in the Last Year:      Ran Out of Food in the Last Year:    Transportation Needs:      Lack of Transportation (Medical):      Lack of Transportation (Non-Medical):    Physical Activity:      Days of Exercise per Week:      Minutes of Exercise per Session:    Stress:      Feeling of Stress :    Social Connections:      Frequency of Communication with Friends and Family:      Frequency of Social Gatherings with Friends and Family:      Attends Church Services:      Active Member of Clubs or Organizations:      Attends Club or Organization Meetings:      Marital Status:    Intimate Partner Violence:      Fear of Current or Ex-Partner:      Emotionally Abused:      Physically Abused:      Sexually Abused:         Allergies   Allergen Reactions     Metoclopramide Other (See Comments)            Key Medications:       fentaNYL  100 mcg Intravenous Once     LORazepam  2 mg Intravenous Once     midazolam  2 mg Intravenous Once     piperacillin-tazobactam  4.5 g Intravenous Once       heparin  700 Units/hr (08/15/21 1638)     propofol (DIPRIVAN) infusion 80 mcg/kg/min (08/15/21 1620)        Home Meds  No current facility-administered medications on file prior to encounter.  DULoxetine (CYMBALTA) 20 MG capsule, Take 20 mg by mouth daily   LORazepam (ATIVAN) 0.5 MG tablet, Take 0.5 mg by mouth every 6 hours as needed for anxiety (or flying)   losartan (COZAAR) 50 MG tablet, Take 50 mg by mouth daily             Physical Examination:   Temp:  [98.1  F (36.7  C)-98.8  F (37.1  C)] 98.8  F (37.1  C)  Pulse:  [107-154] 135  Resp:  [17-38] 24  BP: (109-169)/() 124/78  SpO2:  [77 %-100 %] 95 %    Intake/Output Summary (Last 24 hours) at 8/15/2021 1647  Last data filed at 8/15/2021 1636  Gross per 24 hour   Intake --   Output 650 ml   Net -650 ml     Wt Readings from Last 4 Encounters:   08/15/21 56.7 kg (125 lb)   01/06/12 52.2 kg (115 lb)     BP - Mean:  [] 97  Resp: 24    Recent Labs   Lab 08/15/21  1834   PH 7.30*   PCO2 43   PO2 87   HCO3 21   O2PER 40       GEN: adult woman, intubated and sedated  HEENT: head ncat, sclera anicteric, OP patent, trachea midline. Intubated. NGT in place.   PULM: unlabored synchronous with vent, diffuse crackles  CV/COR: RRR S1S2 no gallop, no rub, descending systolic murmur  ABD: soft nontender, active bowel sounds, no mass  EXT: No edema, warm  NEURO: lightly sedated  SKIN: no obvious rash  LINES: clean, dry intact         Data:   All data and imaging reviewed     ROUTINE ICU LABS (Last four results)  CMP  Recent Labs   Lab 08/15/21  1427   *   POTASSIUM 3.9   CHLORIDE 103   CO2 20   ANIONGAP 8   GLC 92   BUN 10   CR 0.72   GFRESTIMATED >90   KARLY 8.8   MAG 2.0   PROTTOTAL 7.5   ALBUMIN 4.2   BILITOTAL 0.7   ALKPHOS 97   AST 39   ALT 39     CBC  Recent Labs   Lab 08/15/21  1427   WBC 10.6   RBC 4.62   HGB 15.1   HCT 44.0   MCV 95   MCH 32.7   MCHC 34.3   RDW 12.7        INRNo lab results found in last 7 days.  Arterial Blood GasNo lab results found in  last 7 days.    All cultures:  No results for input(s): CULT in the last 168 hours.  Recent Results (from the past 24 hour(s))   XR Chest Port 1 View    Narrative    XR PORTABLE CHEST ONE VIEW   8/15/2021 3:17 PM     HISTORY: Shortness of breath    COMPARISON: None available      Impression    IMPRESSION: Portal AP view of the chest was obtained.  Cardiomediastinal silhouette is within normal limits. Bibasilar  pulmonary opacities, could represent atelectasis versus infection. No  significant pleural effusion or pneumothorax.   XR Chest Port 1 View    Narrative    XR PORTABLE CHEST ONE VIEW   8/15/2021 3:32 PM     HISTORY: Post intubation    COMPARISON: Chest x-ray 8/15/2021.      Impression    Impression: Portable chest. Patchy airspace opacities in the mid to  lower left lung are now superimposed on the background of interstitial  changes seen on prior exam. Findings are concerning for pneumonia.  Endotracheal tube terminates approximately 3 cm above the sophia in  good position. Nasogastric tube extends below the left hemidiaphragm  overlying the region of the stomach. No pneumothorax or pleural  effusions.    HAILEE CRUZ MD         SYSTEM ID:  [id number]   CT Chest Pulmonary Embolism w Contrast    Narrative    CT CHEST PULMONARY EMBOLISM WITH CONTRAST  8/15/2021 3:59 PM    HISTORY:  Palpitation and shortness of breath. Pulmonary embolism  suspected, high probability.    TECHNIQUE: Scans obtained from the apices through the diaphragm with  IV contrast. 56mL [id number]-[id number]     IV injected. Radiation dose for this  scan was reduced using automated exposure control, adjustment of the  mA and/or kV according to patient size, or iterative reconstruction  technique.    COMPARISON:  Chest x-ray on same day earlier    FINDINGS:  Chest/mediastinum: Endotracheal tube tip is in the low thoracic  trachea. Enteric tube crosses the diaphragm with the distal tip in the  stomach. No evidence of pulmonary embolism. Cardiomegaly.  No  significant pericardial effusion. Moderate atherosclerotic vascular  calcification of the coronary arteries and thoracic aorta. Multiple  prominent mediastinal and hilar lymph nodes, indeterminate, could be  reactive.    Lungs and pleura: Small bilateral pleural effusions and associated  basilar atelectasis/consolidation. Scattered patchy and nodular  pulmonary opacities, worrisome for infection. Diffuse interlobular  septal thickening, could be due to underlying pulmonary edema.    Upper abdomen: Limited evaluation of the upper abdomen due to lack of  coverage and timing of contrast. Reflux of contrast into the IVC and  hepatic veins, suggesting right heart dysfunction.    Bones and soft tissue: No suspicious osseous lesion.      Impression    IMPRESSION:   1. No evidence pulmonary embolism.  2. Small bilateral pleural effusions and associated basilar  atelectasis/consolidation.  3. Multiple small patchy and nodular pulmonary opacities, worrisome  for infection. Recommend follow-up exam after symptoms  improvement/resolution.  4. Cardiomegaly and scattered areas of interlobular septal thickening,  could be due to underlying pulmonary edema. Also, reflux of contrast  into the IVC and hepatic veins, suggesting right heart dysfunction.     Renuka Walsh, MS4  Medical Student        Billing: This patient is critically ill: Yes. Total critical care time today 55 min.    I, Montez Rubi MD was present with the medical student who participated in the service and in the documentation of the note.  I have verified the history and personally performed the physical exam and medical decision making.  I agree with the assessment and plan of care as documented in the note and have edited the above to reflect my thought and decision making processes.    Patient is critically ill due to alcohol intoxication, flash pulmonary edema and aortic stenosis.     I personally reviewed vital signs, medications, labs and  imaging.    Montez Rubi MD  [facility] Intensivist Service

## 2021-08-15 NOTE — ED TRIAGE NOTES
[facility]  ED Arrival Note    Arrives through triage. A &O X4. ABC's intact. Pt arrives with c/o palpitations and SOB. Reports drinking yesterday the whole day, and then at 2130 she had the onset. Presents with . Appears anxious and dyspneic      Triage Interventions: direct rooming    Ambulatory: Yes    Meets Stroke Criteria?: No    Meets Trauma Criteria?: No    Directed to: Main ED

## 2021-08-15 NOTE — LETTER
Transition Communication Hand-off for Care Transitions to Next Level of Care Provider    Name: Mayda Lezama  : 1965  MRN #: [id number]  Primary Care Provider: Alaina Griffiths     Primary Clinic: [facility] [address]  [facility] [address]     Reason for Hospitalization:  Alcohol abuse [F10.10]  Acute respiratory failure with hypoxia (H) [J96.01]  Alcohol intoxication, with unspecified complication (H) [F10.929]  Pneumonia of both lungs due to infectious organism, unspecified part of lung [J18.9]  Acute congestive heart failure, unspecified heart failure type (H) [I50.9]  Admit Date/Time: 8/15/2021  2:02 PM  Discharge Date: 2021  Payor Source: Payor: BCBS / Plan: BCBS OUT OF STATE / Product Type: Indemnity /            Reason for Communication Hand-off Referral: Multiple providers/specialties  Other Needs enrollment in your anticoagulation clinic    Discharge Plan: home with family today and outpatient  Cardiac rehab       Concern for non-adherence with plan of care:   Y/N no  Discharge Needs Assessment:  Needs      Most Recent Value   Equipment Currently Used at Home  none          Follow-up specialty is recommended: Yes    Follow-up plan:    Future Appointments   Date Time Provider Department Center   2021 10:00 AM Selena Burns PT [facility]   2021  2:30 PM Selena Burns PT [facility]   2021 10:00 AM [facility] CARDIAC REHAB 4 [facility]   2021  3:00 PM [facility]   10/15/2021 10:15 AM Jen Amor MD [facility] PSA CLIN       Any outstanding tests or procedures:        Referrals     Future Labs/Procedures    CARDIAC REHAB REFERRAL     Process Instructions:    Advance to Wellness and Exercise for Life (WEL) Program or to another maintenance exercise program upon completion of phase 2 cardiac rehab.    Weight mgt program is only offered at [facility].    *This therapy referral will be filtered to a  centralized scheduling office at [facility] and the patient will receive a call to schedule an appointment at a [place] location most convenient for them. *        Comments:    If you have not heard from the scheduling office within 2 business days, please call [phone] for all locations, with the exception of [place], please call [phone] and [place], please call [phone].    Please be aware that coverage of these services is subject to the terms and limitations of your health insurance plan.  Call member services at your health plan with any benefit or coverage questions.              Key Recommendations:  Home today. I have an appointment for hospital follow up, INR blood draw needed and enrollment in anticoagulation monitoring through your clinic. The appointment is scheduled for Wednesday, August 25th with Dr. Poole as Dr. Dowell is booked out into September. Primary concern is for coumadin monitoring. Thank you.     Meredith Parks, RN   [facility]   Phone [phone]    AVS/Discharge Summary is the source of truth; this is a helpful guide for improved communication of patient story

## 2021-08-16 ENCOUNTER — APPOINTMENT (OUTPATIENT)
Dept: CT IMAGING | Facility: CLINIC | Age: 56
End: 2021-08-16
Attending: SURGERY
Payer: COMMERCIAL

## 2021-08-16 ENCOUNTER — PREP FOR PROCEDURE (OUTPATIENT)
Dept: CARDIOLOGY | Facility: CLINIC | Age: 56
End: 2021-08-16

## 2021-08-16 ENCOUNTER — APPOINTMENT (OUTPATIENT)
Dept: CARDIOLOGY | Facility: CLINIC | Age: 56
End: 2021-08-16
Attending: INTERNAL MEDICINE
Payer: COMMERCIAL

## 2021-08-16 ENCOUNTER — APPOINTMENT (OUTPATIENT)
Dept: GENERAL RADIOLOGY | Facility: CLINIC | Age: 56
End: 2021-08-16
Attending: INTERNAL MEDICINE
Payer: COMMERCIAL

## 2021-08-16 DIAGNOSIS — I35.0 AORTIC STENOSIS: Primary | ICD-10-CM

## 2021-08-16 PROBLEM — Q23.81 BICUSPID AORTIC VALVE: Status: ACTIVE | Noted: 2021-08-15

## 2021-08-16 PROBLEM — I50.9 ACUTE CONGESTIVE HEART FAILURE, UNSPECIFIED HEART FAILURE TYPE (H): Status: ACTIVE | Noted: 2021-08-15

## 2021-08-16 LAB
ABO/RH(D): NORMAL
ALBUMIN UR-MCNC: NEGATIVE MG/DL
ANION GAP SERPL CALCULATED.3IONS-SCNC: 6 MMOL/L (ref 3–14)
ANTIBODY SCREEN: NEGATIVE
APPEARANCE UR: CLEAR
BACTERIA #/AREA URNS HPF: ABNORMAL /HPF
BILIRUB UR QL STRIP: NEGATIVE
BUN SERPL-MCNC: 12 MG/DL (ref 7–30)
CALCIUM SERPL-MCNC: 7.7 MG/DL (ref 8.5–10.1)
CHLORIDE BLD-SCNC: 107 MMOL/L (ref 94–109)
CO2 SERPL-SCNC: 24 MMOL/L (ref 20–32)
COLOR UR AUTO: ABNORMAL
CREAT SERPL-MCNC: 0.81 MG/DL (ref 0.52–1.04)
ERYTHROCYTE [DISTWIDTH] IN BLOOD BY AUTOMATED COUNT: 12.4 % (ref 10–15)
GFR SERPL CREATININE-BSD FRML MDRD: 81 ML/MIN/1.73M2
GLUCOSE BLD-MCNC: 84 MG/DL (ref 70–99)
GLUCOSE BLDC GLUCOMTR-MCNC: 123 MG/DL (ref 70–99)
GLUCOSE BLDC GLUCOMTR-MCNC: 178 MG/DL (ref 70–99)
GLUCOSE BLDC GLUCOMTR-MCNC: 56 MG/DL (ref 70–99)
GLUCOSE BLDC GLUCOMTR-MCNC: 83 MG/DL (ref 70–99)
GLUCOSE BLDC GLUCOMTR-MCNC: 85 MG/DL (ref 70–99)
GLUCOSE BLDC GLUCOMTR-MCNC: 91 MG/DL (ref 70–99)
GLUCOSE BLDC GLUCOMTR-MCNC: 98 MG/DL (ref 70–99)
GLUCOSE UR STRIP-MCNC: NEGATIVE MG/DL
HBA1C MFR BLD: 5 % (ref 0–5.6)
HCT VFR BLD AUTO: 38.1 % (ref 35–47)
HGB BLD-MCNC: 13 G/DL (ref 11.7–15.7)
HGB UR QL STRIP: NEGATIVE
HYALINE CASTS: 9 /LPF
KETONES UR STRIP-MCNC: NEGATIVE MG/DL
LACTATE SERPL-SCNC: 0.5 MMOL/L (ref 0.7–2)
LEUKOCYTE ESTERASE UR QL STRIP: ABNORMAL
LVEF ECHO: NORMAL
MAGNESIUM SERPL-MCNC: 1.8 MG/DL (ref 1.6–2.3)
MCH RBC QN AUTO: 32.1 PG (ref 26.5–33)
MCHC RBC AUTO-ENTMCNC: 34.1 G/DL (ref 31.5–36.5)
MCV RBC AUTO: 94 FL (ref 78–100)
MUCOUS THREADS #/AREA URNS LPF: PRESENT /LPF
NITRATE UR QL: NEGATIVE
PH UR STRIP: 5 [PH] (ref 5–7)
PHOSPHATE SERPL-MCNC: 3.7 MG/DL (ref 2.5–4.5)
PLATELET # BLD AUTO: 203 10E3/UL (ref 150–450)
POTASSIUM BLD-SCNC: 3 MMOL/L (ref 3.4–5.3)
POTASSIUM BLD-SCNC: 3.7 MMOL/L (ref 3.4–5.3)
RBC # BLD AUTO: 4.05 10E6/UL (ref 3.8–5.2)
RBC URINE: 0 /HPF
SODIUM SERPL-SCNC: 137 MMOL/L (ref 133–144)
SP GR UR STRIP: 1.01 (ref 1–1.03)
SPECIMEN EXPIRATION DATE: NORMAL
SQUAMOUS EPITHELIAL: <1 /HPF
TROPONIN I SERPL-MCNC: 0.27 UG/L (ref 0–0.04)
UROBILINOGEN UR STRIP-MCNC: NORMAL MG/DL
WBC # BLD AUTO: 6.3 10E3/UL (ref 4–11)
WBC URINE: 19 /HPF

## 2021-08-16 PROCEDURE — 258N000001 HC RX 258: Performed by: INTERNAL MEDICINE

## 2021-08-16 PROCEDURE — 250N000013 HC RX MED GY IP 250 OP 250 PS 637: Performed by: INTERNAL MEDICINE

## 2021-08-16 PROCEDURE — 94003 VENT MGMT INPAT SUBQ DAY: CPT

## 2021-08-16 PROCEDURE — 36415 COLL VENOUS BLD VENIPUNCTURE: CPT | Performed by: SURGERY

## 2021-08-16 PROCEDURE — 250N000011 HC RX IP 250 OP 636: Performed by: INTERNAL MEDICINE

## 2021-08-16 PROCEDURE — 258N000003 HC RX IP 258 OP 636: Performed by: INTERNAL MEDICINE

## 2021-08-16 PROCEDURE — 94002 VENT MGMT INPAT INIT DAY: CPT

## 2021-08-16 PROCEDURE — 70486 CT MAXILLOFACIAL W/O DYE: CPT

## 2021-08-16 PROCEDURE — 250N000013 HC RX MED GY IP 250 OP 250 PS 637: Performed by: SURGERY

## 2021-08-16 PROCEDURE — 84484 ASSAY OF TROPONIN QUANT: CPT | Performed by: INTERNAL MEDICINE

## 2021-08-16 PROCEDURE — 36415 COLL VENOUS BLD VENIPUNCTURE: CPT | Performed by: INTERNAL MEDICINE

## 2021-08-16 PROCEDURE — 999N000009 HC STATISTIC AIRWAY CARE

## 2021-08-16 PROCEDURE — 71045 X-RAY EXAM CHEST 1 VIEW: CPT

## 2021-08-16 PROCEDURE — 80048 BASIC METABOLIC PNL TOTAL CA: CPT | Performed by: INTERNAL MEDICINE

## 2021-08-16 PROCEDURE — 84132 ASSAY OF SERUM POTASSIUM: CPT | Performed by: SURGERY

## 2021-08-16 PROCEDURE — 87086 URINE CULTURE/COLONY COUNT: CPT | Performed by: SURGERY

## 2021-08-16 PROCEDURE — 99221 1ST HOSP IP/OBS SF/LOW 40: CPT | Performed by: INTERNAL MEDICINE

## 2021-08-16 PROCEDURE — 85027 COMPLETE CBC AUTOMATED: CPT | Performed by: INTERNAL MEDICINE

## 2021-08-16 PROCEDURE — 99291 CRITICAL CARE FIRST HOUR: CPT | Performed by: INTERNAL MEDICINE

## 2021-08-16 PROCEDURE — 83036 HEMOGLOBIN GLYCOSYLATED A1C: CPT | Performed by: SURGERY

## 2021-08-16 PROCEDURE — 93306 TTE W/DOPPLER COMPLETE: CPT

## 2021-08-16 PROCEDURE — 86901 BLOOD TYPING SEROLOGIC RH(D): CPT | Performed by: SURGERY

## 2021-08-16 PROCEDURE — 93306 TTE W/DOPPLER COMPLETE: CPT | Mod: 26 | Performed by: INTERNAL MEDICINE

## 2021-08-16 PROCEDURE — 999N000157 HC STATISTIC RCP TIME EA 10 MIN

## 2021-08-16 PROCEDURE — 83605 ASSAY OF LACTIC ACID: CPT | Performed by: INTERNAL MEDICINE

## 2021-08-16 PROCEDURE — 200N000001 HC R&B ICU

## 2021-08-16 PROCEDURE — 83735 ASSAY OF MAGNESIUM: CPT | Performed by: INTERNAL MEDICINE

## 2021-08-16 PROCEDURE — C9113 INJ PANTOPRAZOLE SODIUM, VIA: HCPCS | Performed by: INTERNAL MEDICINE

## 2021-08-16 PROCEDURE — 81001 URINALYSIS AUTO W/SCOPE: CPT | Performed by: SURGERY

## 2021-08-16 PROCEDURE — 84100 ASSAY OF PHOSPHORUS: CPT | Performed by: INTERNAL MEDICINE

## 2021-08-16 PROCEDURE — 99222 1ST HOSP IP/OBS MODERATE 55: CPT | Mod: GC | Performed by: SURGERY

## 2021-08-16 PROCEDURE — 250N000009 HC RX 250: Performed by: INTERNAL MEDICINE

## 2021-08-16 RX ORDER — GABAPENTIN 100 MG/1
100 CAPSULE ORAL EVERY 8 HOURS
Status: DISCONTINUED | OUTPATIENT
Start: 2021-08-23 | End: 2021-08-20

## 2021-08-16 RX ORDER — POTASSIUM CHLORIDE 1.5 G/1.58G
40 POWDER, FOR SOLUTION ORAL ONCE
Status: COMPLETED | OUTPATIENT
Start: 2021-08-16 | End: 2021-08-16

## 2021-08-16 RX ORDER — GABAPENTIN 300 MG/1
600 CAPSULE ORAL EVERY 8 HOURS
Status: COMPLETED | OUTPATIENT
Start: 2021-08-19 | End: 2021-08-20

## 2021-08-16 RX ORDER — GABAPENTIN 300 MG/1
900 CAPSULE ORAL EVERY 8 HOURS
Status: COMPLETED | OUTPATIENT
Start: 2021-08-16 | End: 2021-08-18

## 2021-08-16 RX ORDER — HYDROXYZINE HYDROCHLORIDE 25 MG/1
50 TABLET, FILM COATED ORAL EVERY 6 HOURS PRN
Status: CANCELLED | OUTPATIENT
Start: 2021-08-16

## 2021-08-16 RX ORDER — HYDROXYZINE HYDROCHLORIDE 25 MG/1
25 TABLET, FILM COATED ORAL EVERY 6 HOURS PRN
Status: CANCELLED | OUTPATIENT
Start: 2021-08-16

## 2021-08-16 RX ORDER — GABAPENTIN 300 MG/1
300 CAPSULE ORAL EVERY 8 HOURS
Status: DISCONTINUED | OUTPATIENT
Start: 2021-08-21 | End: 2021-08-20

## 2021-08-16 RX ORDER — FUROSEMIDE 10 MG/ML
20 INJECTION INTRAMUSCULAR; INTRAVENOUS ONCE
Status: COMPLETED | OUTPATIENT
Start: 2021-08-16 | End: 2021-08-16

## 2021-08-16 RX ORDER — HYDROXYZINE HYDROCHLORIDE 25 MG/1
25 TABLET, FILM COATED ORAL EVERY 6 HOURS PRN
Status: DISCONTINUED | OUTPATIENT
Start: 2021-08-16 | End: 2021-08-19

## 2021-08-16 RX ORDER — FUROSEMIDE 10 MG/ML
40 INJECTION INTRAMUSCULAR; INTRAVENOUS ONCE
Status: COMPLETED | OUTPATIENT
Start: 2021-08-16 | End: 2021-08-16

## 2021-08-16 RX ADMIN — GABAPENTIN 900 MG: 250 SOLUTION ORAL at 12:42

## 2021-08-16 RX ADMIN — DEXMEDETOMIDINE HYDROCHLORIDE 1 MCG/KG/HR: 400 INJECTION INTRAVENOUS at 01:00

## 2021-08-16 RX ADMIN — THIAMINE HYDROCHLORIDE 200 MG: 100 INJECTION, SOLUTION INTRAMUSCULAR; INTRAVENOUS at 15:54

## 2021-08-16 RX ADMIN — MULTIPLE VITAMINS W/ MINERALS TAB 1 TABLET: TAB at 08:20

## 2021-08-16 RX ADMIN — DEXMEDETOMIDINE HYDROCHLORIDE 1 MCG/KG/HR: 400 INJECTION INTRAVENOUS at 06:54

## 2021-08-16 RX ADMIN — FOLIC ACID 1 MG: 5 INJECTION, SOLUTION INTRAMUSCULAR; INTRAVENOUS; SUBCUTANEOUS at 08:20

## 2021-08-16 RX ADMIN — DEXTROSE MONOHYDRATE 25 ML: 500 INJECTION PARENTERAL at 01:30

## 2021-08-16 RX ADMIN — THIAMINE HYDROCHLORIDE 200 MG: 100 INJECTION, SOLUTION INTRAMUSCULAR; INTRAVENOUS at 21:47

## 2021-08-16 RX ADMIN — GABAPENTIN 900 MG: 300 CAPSULE ORAL at 19:57

## 2021-08-16 RX ADMIN — PANTOPRAZOLE SODIUM 40 MG: 40 INJECTION, POWDER, FOR SOLUTION INTRAVENOUS at 08:20

## 2021-08-16 RX ADMIN — ENOXAPARIN SODIUM 40 MG: 40 INJECTION SUBCUTANEOUS at 20:00

## 2021-08-16 RX ADMIN — FUROSEMIDE 40 MG: 10 INJECTION, SOLUTION INTRAVENOUS at 09:37

## 2021-08-16 RX ADMIN — FUROSEMIDE 20 MG: 10 INJECTION, SOLUTION INTRAVENOUS at 15:54

## 2021-08-16 RX ADMIN — POTASSIUM CHLORIDE 40 MEQ: 1.5 POWDER, FOR SOLUTION ORAL at 05:50

## 2021-08-16 RX ADMIN — THIAMINE HYDROCHLORIDE 200 MG: 100 INJECTION, SOLUTION INTRAMUSCULAR; INTRAVENOUS at 08:21

## 2021-08-16 RX ADMIN — GABAPENTIN 900 MG: 250 SOLUTION ORAL at 04:00

## 2021-08-16 RX ADMIN — HYDROXYZINE HYDROCHLORIDE 25 MG: 25 TABLET ORAL at 22:34

## 2021-08-16 RX ADMIN — CHLORHEXIDINE GLUCONATE 15 ML: 1.2 SOLUTION ORAL at 08:20

## 2021-08-16 ASSESSMENT — ACTIVITIES OF DAILY LIVING (ADL)
ADLS_ACUITY_SCORE: 22
ADLS_ACUITY_SCORE: 22
ADLS_ACUITY_SCORE: 19

## 2021-08-16 ASSESSMENT — MIFFLIN-ST. JEOR: SCORE: 1183.88

## 2021-08-16 NOTE — PROGRESS NOTES
[facility] ICU RESPIRATORY NOTE        Date of Admission: 8/15/2021    Date of Intubation (most recent): 8/15/21    Reason for Mechanical Ventilation: Airway Protection    Number of Days on Mechanical Ventilation: 2    Met Criteria for Spontaneous Breathing Trial: No    Reason for No Spontaneous Breathing Trial: Per MD    Significant Events Today: None    ABG Results:   Recent Labs   Lab 08/15/21  1834   PH 7.30*   PCO2 43   PO2 87   HCO3 21   O2PER 40       Current Vent Settings: Ventilation Mode: CMV/AC  (Continuous Mandatory Ventilation/ Assist Control)  FiO2 (%): 40 %  Rate Set (breaths/minute): 16 breaths/min  Tidal Volume Set (mL): 420 mL  PEEP (cm H2O): 5 cmH2O  Oxygen Concentration (%): 40 %  Resp: 15      Skin Assessment: intact    Plan: Continue to monitor and assess respiratory status while in ICU.  Continue to maintain ETT patency.  Continue to wean from mechanical ventilation and oxygen as tolerated per protocol.  Assess skin integrity at least once per shift.    Tadeo Francis, RT

## 2021-08-16 NOTE — PROGRESS NOTES
Extubation Note    Successful completion of SBT (Yes or No):Yes  Extubation time:1:06    Patient assessment:  Lung sounds:diminished  Stridor Present (Yes or No): No  Patient tolerance: Well tolerated    Oxygen device:  Liter flow: 4L  SpO2:96    Plan: Continue to monitor      Carmen Mcnair

## 2021-08-16 NOTE — H&P (VIEW-ONLY)
[facility]    Cardiology Consultation     Date of Admission:  8/15/2021    Assessment & Plan     This is a 55 year old female with HTN, bicuspid aortic valve stenosis, anxiety who presents with shortness of breath and palpitations. In the ER she was given 1 liter NS and became hypoxic to the 80s with HR in the 140s, ended up urgently intubated and was then given IV lasix 60 mg when CXR showed pulmonary edema. BNP was elevated to 3200 and D-Dimer 0.94, TN 0.127. Her Na was 131. CTPE was negative for PE. COVID test initially negative. Admitted to ICU and cardiology consulted. Echocardiogram demonstrated severely reduced LVEF 25-30%, aortic stenosis with mean gradient 54 mmHg, PAMELLA 0.6 cm2 in setting of low flow, findings consistent with critical aortic stenosis.    Spent lengthy discussion with  and ICU intensivist. She is in critical condition with her aortic stenosis and am unsure she can be medically optimized and discharged to an elective aortic valve repair situation. It seems more feasible that she undergoes AVR during this hospitalization. We will attempt gentle diuresis, obtain LHC and RHC, and plan for CVTS consult for valve replacement planning. Given her age SAVR is likely to be of most benefit for her.     Please avoid nitrates. Gentle diuresis, no standing diuretic orders as this is flash pulmonary edema and does not seem like long standing fluid overload (chronic). Patients can downward spiral fairly quickly with minor hemodynamic shifts with her severity of aortic stenosis thus caution with abrupt changes in HR, blood pressure.     Will continue to follow.     Jen Amor MD    Code Status    Full Code    Reason for Consult     CHF, aortic stenosis     Primary Care Physician   *Alaina Griffiths    History of Present Illness     This is a 64 year old female with HTN, bicuspid aortic valve stenosis, anxiety who presents with shortness of breath and palpitations.  She was anxious the day before because she was having increased symptoms and tried to self medicate with alcohol per her . She was having a productive cough prior to this for several days as well, no LE edema. She had no fevers, chills but was out drinking with friends on a boat days prior (unvaccinated). In the ER she was given 1 liter NS and became hypoxic to the 80s with HR in the 140s, ended up urgently intubated and was then given IV lasix 60 mg when CXR showed pulmonary edema. BNP was elevated to 3200 and D-Dimer 0.94, TN 0.127. Her Na was 131. CTPE was negative for PE. COVID test initially negative. Admitted to ICU and cardiology consulted. Echocardiogram demonstrated severely reduced LVEF 25-30%, aortic stenosis with mean gradient 54 mmHg, PAMELLA 0.6 cm2 in setting of low flow, findings consistent with critical aortic stenosis.    Past Medical History   I have reviewed this patient's medical history and updated it with pertinent information if needed.   Past Medical History:   Diagnosis Date     Migraines        Past Surgical History   I have reviewed this patient's surgical history and updated it with pertinent information if needed.  No past surgical history on file.    Prior to Admission Medications   Prior to Admission Medications   Prescriptions Last Dose Informant Patient Reported? Taking?   DULoxetine (CYMBALTA) 20 MG capsule  at has not started yet  Yes Yes   Sig: Take 20 mg by mouth daily    LORazepam (ATIVAN) 0.5 MG tablet  at PRN  Yes Yes   Sig: Take 0.5 mg by mouth every 6 hours as needed for anxiety (or flying)    losartan (COZAAR) 50 MG tablet  at has not started yet  Yes Yes   Sig: Take 50 mg by mouth daily      Facility-Administered Medications: None     Allergies   Allergies   Allergen Reactions     Metoclopramide Other (See Comments)       Social History   I have reviewed this patient's social history and updated it with pertinent information if needed. Mayda Lezama  reports that she  has been smoking. She has been smoking about 0.50 packs per day. She does not have any smokeless tobacco history on file. She reports current alcohol use. She reports that she does not use drugs.    Family History   I have reviewed this patient's family history and updated it with pertinent information if needed.   No family history on file.    Review of Systems   The 10 point Review of Systems is negative other than noted in the HPI or here.     Physical Exam   Temp: 96.8  F (36  C) Temp src: Axillary BP: 93/56 Pulse: 64   Resp: 12 SpO2: 96 % O2 Device: Nasal cannula Oxygen Delivery: 4 LPM  Vital Signs with Ranges  Temp:  [96.8  F (36  C)-98.8  F (37.1  C)] 96.8  F (36  C)  Pulse:  [] 64  Resp:  [0-38] 12  BP: ()/() 93/56  FiO2 (%):  [40 %] 40 %  SpO2:  [77 %-100 %] 96 %  130 lbs 11.72 oz    Intubated,sedated  Respiratory: Bibasilar crackles.   Cardiovascular: Regular rate and rhythm, systolic ejection murmur, no S2  GI: Soft, non-distended, non-tender, normal bowel sounds.  Skin: No rashes, no cyanosis, no edema.      Data   Results for orders placed or performed during the hospital encounter of 08/15/21 (from the past 24 hour(s))   [place] Draw    Narrative    The following orders were created for panel order [place] Draw.  Procedure                               Abnormality         Status                     ---------                               -----------         ------                     Extra Blue Top Tube[[id number]]                              Final result               Extra Red Top Tube[[id number]]                               Final result               Extra Green Top (Lithium...[[id number]]                      Final result               Extra Purple Top Tube[[id number]]                            Final result               Extra Blood Bank Purple ...[[id number]]                                                   Please view results for these tests on the individual orders.   Extra  Blue Top Tube   Result Value Ref Range    Hold Specimen JIC    Extra Red Top Tube   Result Value Ref Range    Hold Specimen JIC    Extra Green Top (Lithium Heparin) Tube   Result Value Ref Range    Hold Specimen JIC    Extra Purple Top Tube   Result Value Ref Range    Hold Specimen JIC    CBC with platelets differential    Narrative    The following orders were created for panel order CBC with platelets differential.  Procedure                               Abnormality         Status                     ---------                               -----------         ------                     CBC with platelets and d...[[id number]]  Abnormal            Final result                 Please view results for these tests on the individual orders.   D dimer quantitative   Result Value Ref Range    D-Dimer Quantitative 0.94 (H) 0.00 - 0.50 ug/mL FEU    Narrative    This D-dimer assay is intended for use in conjunction with a clinical pretest probability assessment model to exclude pulmonary embolism (PE) and deep venous thrombosis (DVT) in outpatients suspected of PE or DVT. The cut-off value is 0.50 ug/mL FEU.   Comprehensive metabolic panel   Result Value Ref Range    Sodium 131 (L) 133 - 144 mmol/L    Potassium 3.9 3.4 - 5.3 mmol/L    Chloride 103 94 - 109 mmol/L    Carbon Dioxide (CO2) 20 20 - 32 mmol/L    Anion Gap 8 3 - 14 mmol/L    Urea Nitrogen 10 7 - 30 mg/dL    Creatinine 0.72 0.52 - 1.04 mg/dL    Calcium 8.8 8.5 - 10.1 mg/dL    Glucose 92 70 - 99 mg/dL    Alkaline Phosphatase 97 40 - 150 U/L    AST 39 0 - 45 U/L    ALT 39 0 - 50 U/L    Protein Total 7.5 6.8 - 8.8 g/dL    Albumin 4.2 3.4 - 5.0 g/dL    Bilirubin Total 0.7 0.2 - 1.3 mg/dL    GFR Estimate >90 >60 mL/min/1.73m2   Lipase   Result Value Ref Range    Lipase 133 73 - 393 U/L   Troponin I   Result Value Ref Range    Troponin I 0.127 (HH) 0.000 - 0.045 ug/L   TSH with free T4 reflex   Result Value Ref Range    TSH 0.66 0.40 - 4.00 mU/L   CBC with platelets and  differential   Result Value Ref Range    WBC Count 10.6 4.0 - 11.0 10e3/uL    RBC Count 4.62 3.80 - 5.20 10e6/uL    Hemoglobin 15.1 11.7 - 15.7 g/dL    Hematocrit 44.0 35.0 - 47.0 %    MCV 95 78 - 100 fL    MCH 32.7 26.5 - 33.0 pg    MCHC 34.3 31.5 - 36.5 g/dL    RDW 12.7 10.0 - 15.0 %    Platelet Count 302 150 - 450 10e3/uL    % Neutrophils 75 %    % Lymphocytes 18 %    % Monocytes 5 %    % Eosinophils 0 %    % Basophils 1 %    % Immature Granulocytes 1 %    NRBCs per 100 WBC 0 <1 /100    Absolute Neutrophils 8.0 1.6 - 8.3 10e3/uL    Absolute Lymphocytes 1.9 0.8 - 5.3 10e3/uL    Absolute Monocytes 0.5 0.0 - 1.3 10e3/uL    Absolute Eosinophils 0.0 0.0 - 0.7 10e3/uL    Absolute Basophils 0.1 0.0 - 0.2 10e3/uL    Absolute Immature Granulocytes 0.1 (H) <=0.0 10e3/uL    Absolute NRBCs 0.0 10e3/uL   Ethyl Alcohol Level   Result Value Ref Range    Alcohol ethyl 0.22 (H) <=0.01 g/dL   Magnesium   Result Value Ref Range    Magnesium 2.0 1.6 - 2.3 mg/dL   Nt probnp inpatient (BNP)   Result Value Ref Range    N terminal Pro BNP Inpatient 3,205 (H) 0 - 900 pg/mL   EKG 12-lead, tracing only   Result Value Ref Range    Systolic Blood Pressure  mmHg    Diastolic Blood Pressure  mmHg    Ventricular Rate 140 BPM    Atrial Rate 140 BPM    CT Interval 116 ms    QRS Duration 90 ms     ms    QTc 473 ms    P Axis  degrees    R AXIS 154 degrees    T Axis 134 degrees    Interpretation ECG       Sinus tachycardia  Left posterior fascicular block  Left ventricular hypertrophy with repolarization abnormality  Abnormal ECG  No previous ECGs available  Confirmed by GENERATED REPORT, COMPUTER ([id number]),  S[place] DAVIES ([id number]) on 8/15/2021 3:42:58 PM     XR Chest Port 1 View    Narrative    XR PORTABLE CHEST ONE VIEW   8/15/2021 3:17 PM     HISTORY: Shortness of breath    COMPARISON: None available      Impression    IMPRESSION: Portal AP view of the chest was obtained.  Cardiomediastinal silhouette is within normal limits.  Bibasilar  pulmonary opacities, could represent atelectasis versus infection. No  significant pleural effusion or pneumothorax.    YVONNE JACOBO MD         SYSTEM ID:  [id number]   -Intubation    Narrative    Crescencio Cordoba MD     8/15/2021  6:27 PM  [facility]    -Intubation    Date/Time: 8/15/2021 3:17 PM  Performed by: Crescencio Cordoba MD  Authorized by: Crescencio Cordoba MD       PRE-PROCEDURE DETAILS     Pretreatment medications:  None    Paralytics:  Succinylcholine      PROCEDURE DETAILS     Preoxygenation:  BiPAP    CPR in progress: no      Intubation method:  Oral    Oral intubation technique:  Direct    Laryngoscope blade:  Mac 4    Tube size (mm):  7.5    Tube type:  Cuffed    Number of attempts:  1    Cricoid pressure: no      Tube visualized through cords: yes      PLACEMENT ASSESSMENT     ETT to lip:  23    Placement verification: CXR verification, direct visualization, equal   breath sounds, ETCO2 detector and tube exhalation      CXR findings:  ETT in proper place  PROCEDURE   Patient Tolerance:  Patient tolerated the procedure well with no immediate   complications     Symptomatic COVID-19 Virus (Coronavirus) by PCR Nasopharyngeal    Specimen: Nasopharyngeal; Swab   Result Value Ref Range    SARS CoV2 PCR Negative Negative    Narrative    Testing was performed using the rocio  SARS-CoV-2 & Influenza A/B Assay on the rocio  [place]  System.  This test should be ordered for the detection of SARS-COV-2 in individuals who meet SARS-CoV-2 clinical and/or epidemiological criteria. Test performance is unknown in asymptomatic patients.  This test is for in vitro diagnostic use under the FDA EUA for laboratories certified under CLIA to perform moderate and/or high complexity testing. This test has not been FDA cleared or approved.  A negative test does not rule out the presence of PCR inhibitors in the specimen or target RNA in concentration below the limit of detection  for the assay. The possibility of a false negative should be considered if the patient's recent exposure or clinical presentation suggests COVID-19.  [facility] are certified under the Clinical Laboratory Improvement Amendments of 1988 (CLIA-88) as qualified to perform moderate and/or high complexity laboratory testing.   Blood Culture Peripheral Blood    Specimen: Peripheral Blood   Result Value Ref Range    Culture No growth after 12 hours    XR Chest Port 1 View    Narrative    XR PORTABLE CHEST ONE VIEW   8/15/2021 3:32 PM     HISTORY: Post intubation    COMPARISON: Chest x-ray 8/15/2021.      Impression    Impression: Portable chest. Patchy airspace opacities in the mid to  lower left lung are now superimposed on the background of interstitial  changes seen on prior exam. Findings are concerning for pneumonia.  Endotracheal tube terminates approximately 3 cm above the sophia in  good position. Nasogastric tube extends below the left hemidiaphragm  overlying the region of the stomach. No pneumothorax or pleural  effusions.    HAILEE CRUZ MD         SYSTEM ID:  [id number]   CT Chest Pulmonary Embolism w Contrast    Narrative    CT CHEST PULMONARY EMBOLISM WITH CONTRAST  8/15/2021 3:59 PM    HISTORY:  Palpitation and shortness of breath. Pulmonary embolism  suspected, high probability.    TECHNIQUE: Scans obtained from the apices through the diaphragm with  IV contrast. 56mL [id number]-[id number]     IV injected. Radiation dose for this  scan was reduced using automated exposure control, adjustment of the  mA and/or kV according to patient size, or iterative reconstruction  technique.    COMPARISON:  Chest x-ray on same day earlier    FINDINGS:  Chest/mediastinum: Endotracheal tube tip is in the low thoracic  trachea. Enteric tube crosses the diaphragm with the distal tip in the  stomach. No evidence of pulmonary embolism. Cardiomegaly. No  significant pericardial effusion. Moderate atherosclerotic  vascular  calcification of the coronary arteries and thoracic aorta. Multiple  prominent mediastinal and hilar lymph nodes, indeterminate, could be  reactive.    Lungs and pleura: Small bilateral pleural effusions and associated  basilar atelectasis/consolidation. Scattered patchy and nodular  pulmonary opacities, worrisome for infection. Diffuse interlobular  septal thickening, could be due to underlying pulmonary edema.    Upper abdomen: Limited evaluation of the upper abdomen due to lack of  coverage and timing of contrast. Reflux of contrast into the IVC and  hepatic veins, suggesting right heart dysfunction.    Bones and soft tissue: No suspicious osseous lesion.      Impression    IMPRESSION:   1. No evidence pulmonary embolism.  2. Small bilateral pleural effusions and associated basilar  atelectasis/consolidation.  3. Multiple small patchy and nodular pulmonary opacities, worrisome  for infection. Recommend follow-up exam after symptoms  improvement/resolution.  4. Cardiomegaly and scattered areas of interlobular septal thickening,  could be due to underlying pulmonary edema. Also, reflux of contrast  into the IVC and hepatic veins, suggesting right heart dysfunction.    YVONNE JACOBO MD         SYSTEM ID:  [id number]   Lactic acid whole blood   Result Value Ref Range    Lactic Acid 3.1 (H) 0.7 - 2.0 mmol/L   Blood Culture Peripheral Blood    Specimen: Peripheral Blood   Result Value Ref Range    Culture No growth after 12 hours    Glucose by meter   Result Value Ref Range    GLUCOSE BY METER POCT 98 70 - 99 mg/dL   Blood gas arterial and oxyhgb   Result Value Ref Range    pH Arterial 7.30 (L) 7.35 - 7.45    pCO2 Arterial 43 35 - 45 mm Hg    pO2 Arterial 87 80 - 105 mm Hg    Bicarbonate Arterial 21 21 - 28 mmol/L    Oxyhemoglobin Arterial 94 92 - 100 %    Base Excess/Deficit (+/-) -5.0 -9.0 - 1.8 mmol/L    FIO2 40    Glucose by meter   Result Value Ref Range    GLUCOSE BY METER POCT 85 70 - 99 mg/dL    Magnesium   Result Value Ref Range    Magnesium 1.7 1.6 - 2.3 mg/dL   Phosphorus   Result Value Ref Range    Phosphorus 5.0 (H) 2.5 - 4.5 mg/dL   Glucose by meter   Result Value Ref Range    GLUCOSE BY METER POCT 56 (L) 70 - 99 mg/dL   Glucose by meter   Result Value Ref Range    GLUCOSE BY METER POCT 178 (H) 70 - 99 mg/dL   Glucose by meter   Result Value Ref Range    GLUCOSE BY METER POCT 123 (H) 70 - 99 mg/dL   CBC with platelets   Result Value Ref Range    WBC Count 6.3 4.0 - 11.0 10e3/uL    RBC Count 4.05 3.80 - 5.20 10e6/uL    Hemoglobin 13.0 11.7 - 15.7 g/dL    Hematocrit 38.1 35.0 - 47.0 %    MCV 94 78 - 100 fL    MCH 32.1 26.5 - 33.0 pg    MCHC 34.1 31.5 - 36.5 g/dL    RDW 12.4 10.0 - 15.0 %    Platelet Count 203 150 - 450 10e3/uL   Basic metabolic panel   Result Value Ref Range    Sodium 137 133 - 144 mmol/L    Potassium 3.0 (L) 3.4 - 5.3 mmol/L    Chloride 107 94 - 109 mmol/L    Carbon Dioxide (CO2) 24 20 - 32 mmol/L    Anion Gap 6 3 - 14 mmol/L    Urea Nitrogen 12 7 - 30 mg/dL    Creatinine 0.81 0.52 - 1.04 mg/dL    Calcium 7.7 (L) 8.5 - 10.1 mg/dL    Glucose 84 70 - 99 mg/dL    GFR Estimate 81 >60 mL/min/1.73m2   Lactic acid whole blood   Result Value Ref Range    Lactic Acid 0.5 (L) 0.7 - 2.0 mmol/L   Glucose by meter   Result Value Ref Range    GLUCOSE BY METER POCT 83 70 - 99 mg/dL   XR Chest Port 1 View    Narrative    XR CHEST PORT 1 VIEW   8/16/2021 7:46 AM     HISTORY: pulm edema    COMPARISON: Chest CT 8/15/2021      Impression    IMPRESSION: Endotracheal tube tip 3.4 cm above the sophia. Enteric  tube tip below the diaphragm. Increased retrocardiac left lower lung  consolidation which may represent compressive atelectasis or  pneumonia. Unchanged small left and trace right pleural effusions.  Decreased pulmonary venous congestion. Normal heart size. No  pneumothorax.    NEELAM TUBBS MD         SYSTEM ID:  [id number]   Echocardiogram Complete   Result Value Ref Range    LVEF  25-30%      Merged with [facility]    [id number]  [id number]  [id number]  [id number]^DIONI^KARL^ANNAMARIA     [facility]  Echocardiography Laboratory  [address] [place] [address]     Name: MIKHAIL ESPINAL  MRN: [id number]  : 1965  Study Date: 2021 07:56 AM  Age: 55 yrs  Gender: Female  Patient Location: [facility]  Reason For Study: Respiratory Failure, Aortic Valve Disorder  Ordering Physician: KARL WHEATLEY  Referring Physician: KARL WHEATLEY  Performed By: NEGRO Garcia     BSA: 1.6 m2  Height: 65 in  Weight: 130 lb  HR: 62  BP: 107/71 mmHg  ______________________________________________________________________________  Procedure  Complete Portable Echo Adult.  ______________________________________________________________________________  Interpretation Summary     1. Left ventricular systolic function is severely reduced. The visual ejection  fraction is 25-30%.  2. The right ventricle is normal in structure, function and size.  3. The left atrium is mildly dilated.  4. Severe aortic stenosis; mean gradient 54 mmHg, peak velocity 4.4 m/sec,  calculated valve area 0.6 cm2. Patient has known history of bicuspid aortic  valve.  5. Compared with the prior report from an outside hospital dated 18, the  gradients across the aortic valve have further increased and the LV function  is now reduced.  ______________________________________________________________________________  Left Ventricle  The left ventricle is normal in size. There is mild concentric left  ventricular hypertrophy. Left ventricular systolic function is severely  reduced. The visual ejection fraction is 25-30%. There is severe global  hypokinesia of the left ventricle.     Right Ventricle  The right ventricle is normal in structure, function and size.     Atria  The left atrium is mildly dilated. Right atrial size is normal.     Mitral Valve  The mitral valve is normal in structure and function. There is  mild (1+)  mitral regurgitation.     Tricuspid Valve  The tricuspid valve is normal in structure and function. There is trace  tricuspid regurgitation.     Aortic Valve  There is mild (1+) aortic regurgitation. Severe valvular aortic stenosis.  Severe aortic stenosis; mean gradient 54 mmHg, peak velocity 4.4 m/sec,  calculated valve area 0.6 cm2. Patient has known history of bicuspid aortic  valve.     Pulmonic Valve  The pulmonic valve is normal in structure and function.     Vessels  Normal size ascending aorta. Dilated IVC; patient intubated.     Pericardium  There is no pericardial effusion.     Rhythm  Sinus rhythm was noted.  ______________________________________________________________________________  MMode/2D Measurements & Calculations  IVSd: 1.2 cm     LVIDd: 4.9 cm  LVIDs: 4.7 cm  LVPWd: 1.1 cm  FS: 5.2 %  LV mass(C)d: 223.1 grams  LV mass(C)dI: 135.4 grams/m2  Ao root diam: 2.6 cm  asc Aorta Diam: 3.3 cm  LVOT diam: 2.2 cm  LVOT area: 3.9 cm2  LA Volume (BP): 54.7 ml  LA Volume Index (BP): 33.2 ml/m2  RWT: 0.45     Doppler Measurements & Calculations  MV E max abbe: 92.5 cm/sec  MV A max abbe: 92.5 cm/sec  MV E/A: 1.0  MV max PG: 3.2 mmHg  MV mean P.7 mmHg  MV V2 VTI: 35.9 cm  MVA(VTI): 1.9 cm2  MV dec time: 0.21 sec  Ao V2 max: 436.0 cm/sec  Ao max P.1 mmHg  Ao V2 mean: 360.4 cm/sec  Ao mean P.2 mmHg  Ao V2 VTI: 123.3 cm  PAMELLA(I,D): 0.56 cm2  PAMELLA(V,D): 0.56 cm2  AI P1/2t: 482.4 msec  LV V1 max P.6 mmHg  LV V1 max: 62.4 cm/sec  LV V1 VTI: 17.6 cm  SV(LVOT): 68.6 ml  SI(LVOT): 41.7 ml/m2  PA acc time: 0.13 sec  AV Abbe Ratio (DI): 0.14  PAMELLA Index (cm2/m2): 0.34  E/E' av.4  Lateral E/e': 22.8  Medial E/e': 29.9     ______________________________________________________________________________  Report approved by: Bob Castaneda 2021 09:03 AM         Glucose by meter   Result Value Ref Range    GLUCOSE BY METER POCT 91 70 - 99 mg/dL   Potassium   Result Value Ref Range     Potassium 3.7 3.4 - 5.3 mmol/L   Magnesium   Result Value Ref Range    Magnesium 1.8 1.6 - 2.3 mg/dL   Phosphorus   Result Value Ref Range    Phosphorus 3.7 2.5 - 4.5 mg/dL   ABO/Rh type and screen    Narrative    The following orders were created for panel order ABO/Rh type and screen.  Procedure                               Abnormality         Status                     ---------                               -----------         ------                     Adult Type and Screen[[id number]]                                                         Please view results for these tests on the individual orders.

## 2021-08-16 NOTE — CONSULTS
[facility]    Cardiology Consultation     Date of Admission:  8/15/2021    Assessment & Plan     This is a 55 year old female with HTN, bicuspid aortic valve stenosis, anxiety who presents with shortness of breath and palpitations. In the ER she was given 1 liter NS and became hypoxic to the 80s with HR in the 140s, ended up urgently intubated and was then given IV lasix 60 mg when CXR showed pulmonary edema. BNP was elevated to 3200 and D-Dimer 0.94, TN 0.127. Her Na was 131. CTPE was negative for PE. COVID test initially negative. Admitted to ICU and cardiology consulted. Echocardiogram demonstrated severely reduced LVEF 25-30%, aortic stenosis with mean gradient 54 mmHg, PAMELLA 0.6 cm2 in setting of low flow, findings consistent with critical aortic stenosis.    Spent lengthy discussion with  and ICU intensivist. She is in critical condition with her aortic stenosis and am unsure she can be medically optimized and discharged to an elective aortic valve repair situation. It seems more feasible that she undergoes AVR during this hospitalization. We will attempt gentle diuresis, obtain LHC and RHC, and plan for CVTS consult for valve replacement planning. Given her age SAVR is likely to be of most benefit for her.     Please avoid nitrates. Gentle diuresis, no standing diuretic orders as this is flash pulmonary edema and does not seem like long standing fluid overload (chronic). Patients can downward spiral fairly quickly with minor hemodynamic shifts with her severity of aortic stenosis thus caution with abrupt changes in HR, blood pressure.     Will continue to follow.     Jen Amor MD    Code Status    Full Code    Reason for Consult     CHF, aortic stenosis     Primary Care Physician   *Alaina Griffiths    History of Present Illness     This is a 64 year old female with HTN, bicuspid aortic valve stenosis, anxiety who presents with shortness of breath and palpitations.  She was anxious the day before because she was having increased symptoms and tried to self medicate with alcohol per her . She was having a productive cough prior to this for several days as well, no LE edema. She had no fevers, chills but was out drinking with friends on a boat days prior (unvaccinated). In the ER she was given 1 liter NS and became hypoxic to the 80s with HR in the 140s, ended up urgently intubated and was then given IV lasix 60 mg when CXR showed pulmonary edema. BNP was elevated to 3200 and D-Dimer 0.94, TN 0.127. Her Na was 131. CTPE was negative for PE. COVID test initially negative. Admitted to ICU and cardiology consulted. Echocardiogram demonstrated severely reduced LVEF 25-30%, aortic stenosis with mean gradient 54 mmHg, PAMELLA 0.6 cm2 in setting of low flow, findings consistent with critical aortic stenosis.    Past Medical History   I have reviewed this patient's medical history and updated it with pertinent information if needed.   Past Medical History:   Diagnosis Date     Migraines        Past Surgical History   I have reviewed this patient's surgical history and updated it with pertinent information if needed.  No past surgical history on file.    Prior to Admission Medications   Prior to Admission Medications   Prescriptions Last Dose Informant Patient Reported? Taking?   DULoxetine (CYMBALTA) 20 MG capsule  at has not started yet  Yes Yes   Sig: Take 20 mg by mouth daily    LORazepam (ATIVAN) 0.5 MG tablet  at PRN  Yes Yes   Sig: Take 0.5 mg by mouth every 6 hours as needed for anxiety (or flying)    losartan (COZAAR) 50 MG tablet  at has not started yet  Yes Yes   Sig: Take 50 mg by mouth daily      Facility-Administered Medications: None     Allergies   Allergies   Allergen Reactions     Metoclopramide Other (See Comments)       Social History   I have reviewed this patient's social history and updated it with pertinent information if needed. Mayda Lezama  reports that she  has been smoking. She has been smoking about 0.50 packs per day. She does not have any smokeless tobacco history on file. She reports current alcohol use. She reports that she does not use drugs.    Family History   I have reviewed this patient's family history and updated it with pertinent information if needed.   No family history on file.    Review of Systems   The 10 point Review of Systems is negative other than noted in the HPI or here.     Physical Exam   Temp: 96.8  F (36  C) Temp src: Axillary BP: 93/56 Pulse: 64   Resp: 12 SpO2: 96 % O2 Device: Nasal cannula Oxygen Delivery: 4 LPM  Vital Signs with Ranges  Temp:  [96.8  F (36  C)-98.8  F (37.1  C)] 96.8  F (36  C)  Pulse:  [] 64  Resp:  [0-38] 12  BP: ()/() 93/56  FiO2 (%):  [40 %] 40 %  SpO2:  [77 %-100 %] 96 %  130 lbs 11.72 oz    Intubated,sedated  Respiratory: Bibasilar crackles.   Cardiovascular: Regular rate and rhythm, systolic ejection murmur, no S2  GI: Soft, non-distended, non-tender, normal bowel sounds.  Skin: No rashes, no cyanosis, no edema.      Data   Results for orders placed or performed during the hospital encounter of 08/15/21 (from the past 24 hour(s))   [place] Draw    Narrative    The following orders were created for panel order [place] Draw.  Procedure                               Abnormality         Status                     ---------                               -----------         ------                     Extra Blue Top Tube[[id number]]                              Final result               Extra Red Top Tube[[id number]]                               Final result               Extra Green Top (Lithium...[[id number]]                      Final result               Extra Purple Top Tube[[id number]]                            Final result               Extra Blood Bank Purple ...[[id number]]                                                   Please view results for these tests on the individual orders.   Extra  Blue Top Tube   Result Value Ref Range    Hold Specimen JIC    Extra Red Top Tube   Result Value Ref Range    Hold Specimen JIC    Extra Green Top (Lithium Heparin) Tube   Result Value Ref Range    Hold Specimen JIC    Extra Purple Top Tube   Result Value Ref Range    Hold Specimen JIC    CBC with platelets differential    Narrative    The following orders were created for panel order CBC with platelets differential.  Procedure                               Abnormality         Status                     ---------                               -----------         ------                     CBC with platelets and d...[[id number]]  Abnormal            Final result                 Please view results for these tests on the individual orders.   D dimer quantitative   Result Value Ref Range    D-Dimer Quantitative 0.94 (H) 0.00 - 0.50 ug/mL FEU    Narrative    This D-dimer assay is intended for use in conjunction with a clinical pretest probability assessment model to exclude pulmonary embolism (PE) and deep venous thrombosis (DVT) in outpatients suspected of PE or DVT. The cut-off value is 0.50 ug/mL FEU.   Comprehensive metabolic panel   Result Value Ref Range    Sodium 131 (L) 133 - 144 mmol/L    Potassium 3.9 3.4 - 5.3 mmol/L    Chloride 103 94 - 109 mmol/L    Carbon Dioxide (CO2) 20 20 - 32 mmol/L    Anion Gap 8 3 - 14 mmol/L    Urea Nitrogen 10 7 - 30 mg/dL    Creatinine 0.72 0.52 - 1.04 mg/dL    Calcium 8.8 8.5 - 10.1 mg/dL    Glucose 92 70 - 99 mg/dL    Alkaline Phosphatase 97 40 - 150 U/L    AST 39 0 - 45 U/L    ALT 39 0 - 50 U/L    Protein Total 7.5 6.8 - 8.8 g/dL    Albumin 4.2 3.4 - 5.0 g/dL    Bilirubin Total 0.7 0.2 - 1.3 mg/dL    GFR Estimate >90 >60 mL/min/1.73m2   Lipase   Result Value Ref Range    Lipase 133 73 - 393 U/L   Troponin I   Result Value Ref Range    Troponin I 0.127 (HH) 0.000 - 0.045 ug/L   TSH with free T4 reflex   Result Value Ref Range    TSH 0.66 0.40 - 4.00 mU/L   CBC with platelets and  differential   Result Value Ref Range    WBC Count 10.6 4.0 - 11.0 10e3/uL    RBC Count 4.62 3.80 - 5.20 10e6/uL    Hemoglobin 15.1 11.7 - 15.7 g/dL    Hematocrit 44.0 35.0 - 47.0 %    MCV 95 78 - 100 fL    MCH 32.7 26.5 - 33.0 pg    MCHC 34.3 31.5 - 36.5 g/dL    RDW 12.7 10.0 - 15.0 %    Platelet Count 302 150 - 450 10e3/uL    % Neutrophils 75 %    % Lymphocytes 18 %    % Monocytes 5 %    % Eosinophils 0 %    % Basophils 1 %    % Immature Granulocytes 1 %    NRBCs per 100 WBC 0 <1 /100    Absolute Neutrophils 8.0 1.6 - 8.3 10e3/uL    Absolute Lymphocytes 1.9 0.8 - 5.3 10e3/uL    Absolute Monocytes 0.5 0.0 - 1.3 10e3/uL    Absolute Eosinophils 0.0 0.0 - 0.7 10e3/uL    Absolute Basophils 0.1 0.0 - 0.2 10e3/uL    Absolute Immature Granulocytes 0.1 (H) <=0.0 10e3/uL    Absolute NRBCs 0.0 10e3/uL   Ethyl Alcohol Level   Result Value Ref Range    Alcohol ethyl 0.22 (H) <=0.01 g/dL   Magnesium   Result Value Ref Range    Magnesium 2.0 1.6 - 2.3 mg/dL   Nt probnp inpatient (BNP)   Result Value Ref Range    N terminal Pro BNP Inpatient 3,205 (H) 0 - 900 pg/mL   EKG 12-lead, tracing only   Result Value Ref Range    Systolic Blood Pressure  mmHg    Diastolic Blood Pressure  mmHg    Ventricular Rate 140 BPM    Atrial Rate 140 BPM    WI Interval 116 ms    QRS Duration 90 ms     ms    QTc 473 ms    P Axis  degrees    R AXIS 154 degrees    T Axis 134 degrees    Interpretation ECG       Sinus tachycardia  Left posterior fascicular block  Left ventricular hypertrophy with repolarization abnormality  Abnormal ECG  No previous ECGs available  Confirmed by GENERATED REPORT, COMPUTER ([id number]),  SPR[place] DAVIES ([id number]) on 8/15/2021 3:42:58 PM     XR Chest Port 1 View    Narrative    XR PORTABLE CHEST ONE VIEW   8/15/2021 3:17 PM     HISTORY: Shortness of breath    COMPARISON: None available      Impression    IMPRESSION: Portal AP view of the chest was obtained.  Cardiomediastinal silhouette is within normal limits.  Bibasilar  pulmonary opacities, could represent atelectasis versus infection. No  significant pleural effusion or pneumothorax.    YVONNE JACOBO MD         SYSTEM ID:  [id number]   -Intubation    Narrative    Crescencio Cordoba MD     8/15/2021  6:27 PM  [facility]    -Intubation    Date/Time: 8/15/2021 3:17 PM  Performed by: Crescencio Cordoba MD  Authorized by: Crescencio Cordoba MD       PRE-PROCEDURE DETAILS     Pretreatment medications:  None    Paralytics:  Succinylcholine      PROCEDURE DETAILS     Preoxygenation:  BiPAP    CPR in progress: no      Intubation method:  Oral    Oral intubation technique:  Direct    Laryngoscope blade:  Mac 4    Tube size (mm):  7.5    Tube type:  Cuffed    Number of attempts:  1    Cricoid pressure: no      Tube visualized through cords: yes      PLACEMENT ASSESSMENT     ETT to lip:  23    Placement verification: CXR verification, direct visualization, equal   breath sounds, ETCO2 detector and tube exhalation      CXR findings:  ETT in proper place  PROCEDURE   Patient Tolerance:  Patient tolerated the procedure well with no immediate   complications     Symptomatic COVID-19 Virus (Coronavirus) by PCR Nasopharyngeal    Specimen: Nasopharyngeal; Swab   Result Value Ref Range    SARS CoV2 PCR Negative Negative    Narrative    Testing was performed using the rocio  SARS-CoV-2 & Influenza A/B Assay on the rocio  R[place]anne  System.  This test should be ordered for the detection of SARS-COV-2 in individuals who meet SARS-CoV-2 clinical and/or epidemiological criteria. Test performance is unknown in asymptomatic patients.  This test is for in vitro diagnostic use under the FDA EUA for laboratories certified under CLIA to perform moderate and/or high complexity testing. This test has not been FDA cleared or approved.  A negative test does not rule out the presence of PCR inhibitors in the specimen or target RNA in concentration below the limit of detection  for the assay. The possibility of a false negative should be considered if the patient's recent exposure or clinical presentation suggests COVID-19.  [facility] are certified under the Clinical Laboratory Improvement Amendments of 1988 (CLIA-88) as qualified to perform moderate and/or high complexity laboratory testing.   Blood Culture Peripheral Blood    Specimen: Peripheral Blood   Result Value Ref Range    Culture No growth after 12 hours    XR Chest Port 1 View    Narrative    XR PORTABLE CHEST ONE VIEW   8/15/2021 3:32 PM     HISTORY: Post intubation    COMPARISON: Chest x-ray 8/15/2021.      Impression    Impression: Portable chest. Patchy airspace opacities in the mid to  lower left lung are now superimposed on the background of interstitial  changes seen on prior exam. Findings are concerning for pneumonia.  Endotracheal tube terminates approximately 3 cm above the sophia in  good position. Nasogastric tube extends below the left hemidiaphragm  overlying the region of the stomach. No pneumothorax or pleural  effusions.    HAILEE CRUZ MD         SYSTEM ID:  [id number]   CT Chest Pulmonary Embolism w Contrast    Narrative    CT CHEST PULMONARY EMBOLISM WITH CONTRAST  8/15/2021 3:59 PM    HISTORY:  Palpitation and shortness of breath. Pulmonary embolism  suspected, high probability.    TECHNIQUE: Scans obtained from the apices through the diaphragm with  IV contrast. 56mL [id number]-370     IV injected. Radiation dose for this  scan was reduced using automated exposure control, adjustment of the  mA and/or kV according to patient size, or iterative reconstruction  technique.    COMPARISON:  Chest x-ray on same day earlier    FINDINGS:  Chest/mediastinum: Endotracheal tube tip is in the low thoracic  trachea. Enteric tube crosses the diaphragm with the distal tip in the  stomach. No evidence of pulmonary embolism. Cardiomegaly. No  significant pericardial effusion. Moderate atherosclerotic  vascular  calcification of the coronary arteries and thoracic aorta. Multiple  prominent mediastinal and hilar lymph nodes, indeterminate, could be  reactive.    Lungs and pleura: Small bilateral pleural effusions and associated  basilar atelectasis/consolidation. Scattered patchy and nodular  pulmonary opacities, worrisome for infection. Diffuse interlobular  septal thickening, could be due to underlying pulmonary edema.    Upper abdomen: Limited evaluation of the upper abdomen due to lack of  coverage and timing of contrast. Reflux of contrast into the IVC and  hepatic veins, suggesting right heart dysfunction.    Bones and soft tissue: No suspicious osseous lesion.      Impression    IMPRESSION:   1. No evidence pulmonary embolism.  2. Small bilateral pleural effusions and associated basilar  atelectasis/consolidation.  3. Multiple small patchy and nodular pulmonary opacities, worrisome  for infection. Recommend follow-up exam after symptoms  improvement/resolution.  4. Cardiomegaly and scattered areas of interlobular septal thickening,  could be due to underlying pulmonary edema. Also, reflux of contrast  into the IVC and hepatic veins, suggesting right heart dysfunction.    YVONNE JACOBO MD         SYSTEM ID:  [id number]   Lactic acid whole blood   Result Value Ref Range    Lactic Acid 3.1 (H) 0.7 - 2.0 mmol/L   Blood Culture Peripheral Blood    Specimen: Peripheral Blood   Result Value Ref Range    Culture No growth after 12 hours    Glucose by meter   Result Value Ref Range    GLUCOSE BY METER POCT 98 70 - 99 mg/dL   Blood gas arterial and oxyhgb   Result Value Ref Range    pH Arterial 7.30 (L) 7.35 - 7.45    pCO2 Arterial 43 35 - 45 mm Hg    pO2 Arterial 87 80 - 105 mm Hg    Bicarbonate Arterial 21 21 - 28 mmol/L    Oxyhemoglobin Arterial 94 92 - 100 %    Base Excess/Deficit (+/-) -5.0 -9.0 - 1.8 mmol/L    FIO2 40    Glucose by meter   Result Value Ref Range    GLUCOSE BY METER POCT 85 70 - 99 mg/dL    Magnesium   Result Value Ref Range    Magnesium 1.7 1.6 - 2.3 mg/dL   Phosphorus   Result Value Ref Range    Phosphorus 5.0 (H) 2.5 - 4.5 mg/dL   Glucose by meter   Result Value Ref Range    GLUCOSE BY METER POCT 56 (L) 70 - 99 mg/dL   Glucose by meter   Result Value Ref Range    GLUCOSE BY METER POCT 178 (H) 70 - 99 mg/dL   Glucose by meter   Result Value Ref Range    GLUCOSE BY METER POCT 123 (H) 70 - 99 mg/dL   CBC with platelets   Result Value Ref Range    WBC Count 6.3 4.0 - 11.0 10e3/uL    RBC Count 4.05 3.80 - 5.20 10e6/uL    Hemoglobin 13.0 11.7 - 15.7 g/dL    Hematocrit 38.1 35.0 - 47.0 %    MCV 94 78 - 100 fL    MCH 32.1 26.5 - 33.0 pg    MCHC 34.1 31.5 - 36.5 g/dL    RDW 12.4 10.0 - 15.0 %    Platelet Count 203 150 - 450 10e3/uL   Basic metabolic panel   Result Value Ref Range    Sodium 137 133 - 144 mmol/L    Potassium 3.0 (L) 3.4 - 5.3 mmol/L    Chloride 107 94 - 109 mmol/L    Carbon Dioxide (CO2) 24 20 - 32 mmol/L    Anion Gap 6 3 - 14 mmol/L    Urea Nitrogen 12 7 - 30 mg/dL    Creatinine 0.81 0.52 - 1.04 mg/dL    Calcium 7.7 (L) 8.5 - 10.1 mg/dL    Glucose 84 70 - 99 mg/dL    GFR Estimate 81 >60 mL/min/1.73m2   Lactic acid whole blood   Result Value Ref Range    Lactic Acid 0.5 (L) 0.7 - 2.0 mmol/L   Glucose by meter   Result Value Ref Range    GLUCOSE BY METER POCT 83 70 - 99 mg/dL   XR Chest Port 1 View    Narrative    XR CHEST PORT 1 VIEW   8/16/2021 7:46 AM     HISTORY: pulm edema    COMPARISON: Chest CT 8/15/2021      Impression    IMPRESSION: Endotracheal tube tip 3.4 cm above the sophia. Enteric  tube tip below the diaphragm. Increased retrocardiac left lower lung  consolidation which may represent compressive atelectasis or  pneumonia. Unchanged small left and trace right pleural effusions.  Decreased pulmonary venous congestion. Normal heart size. No  pneumothorax.    NEELAM TUBBS MD         SYSTEM ID:  [id number]   Echocardiogram Complete   Result Value Ref Range    LVEF  25-30%      [facility]    [id number]  [id number]  [id number]  [id number]^DIONI^KARL^ANNAMARIA     [facility]  Echocardiography Laboratory  [address] [place] [address]     Name: MIKHAIL ESPINAL  MRN: [id number]  : 1965  Study Date: 2021 07:56 AM  Age: 55 yrs  Gender: Female  Patient Location: [facility]  Reason For Study: Respiratory Failure, Aortic Valve Disorder  Ordering Physician: KARL WHEATLEY  Referring Physician: KARL WHEATLEY  Performed By: NEGRO Garcia     BSA: 1.6 m2  Height: 65 in  Weight: 130 lb  HR: 62  BP: 107/71 mmHg  ______________________________________________________________________________  Procedure  Complete Portable Echo Adult.  ______________________________________________________________________________  Interpretation Summary     1. Left ventricular systolic function is severely reduced. The visual ejection  fraction is 25-30%.  2. The right ventricle is normal in structure, function and size.  3. The left atrium is mildly dilated.  4. Severe aortic stenosis; mean gradient 54 mmHg, peak velocity 4.4 m/sec,  calculated valve area 0.6 cm2. Patient has known history of bicuspid aortic  valve.  5. Compared with the prior report from an outside hospital dated 18, the  gradients across the aortic valve have further increased and the LV function  is now reduced.  ______________________________________________________________________________  Left Ventricle  The left ventricle is normal in size. There is mild concentric left  ventricular hypertrophy. Left ventricular systolic function is severely  reduced. The visual ejection fraction is 25-30%. There is severe global  hypokinesia of the left ventricle.     Right Ventricle  The right ventricle is normal in structure, function and size.     Atria  The left atrium is mildly dilated. Right atrial size is normal.     Mitral Valve  The mitral valve is normal in structure and function. There is  mild (1+)  mitral regurgitation.     Tricuspid Valve  The tricuspid valve is normal in structure and function. There is trace  tricuspid regurgitation.     Aortic Valve  There is mild (1+) aortic regurgitation. Severe valvular aortic stenosis.  Severe aortic stenosis; mean gradient 54 mmHg, peak velocity 4.4 m/sec,  calculated valve area 0.6 cm2. Patient has known history of bicuspid aortic  valve.     Pulmonic Valve  The pulmonic valve is normal in structure and function.     Vessels  Normal size ascending aorta. Dilated IVC; patient intubated.     Pericardium  There is no pericardial effusion.     Rhythm  Sinus rhythm was noted.  ______________________________________________________________________________  MMode/2D Measurements & Calculations  IVSd: 1.2 cm     LVIDd: 4.9 cm  LVIDs: 4.7 cm  LVPWd: 1.1 cm  FS: 5.2 %  LV mass(C)d: 223.1 grams  LV mass(C)dI: 135.4 grams/m2  Ao root diam: 2.6 cm  asc Aorta Diam: 3.3 cm  LVOT diam: 2.2 cm  LVOT area: 3.9 cm2  LA Volume (BP): 54.7 ml  LA Volume Index (BP): 33.2 ml/m2  RWT: 0.45     Doppler Measurements & Calculations  MV E max abbe: 92.5 cm/sec  MV A max abbe: 92.5 cm/sec  MV E/A: 1.0  MV max PG: 3.2 mmHg  MV mean P.7 mmHg  MV V2 VTI: 35.9 cm  MVA(VTI): 1.9 cm2  MV dec time: 0.21 sec  Ao V2 max: 436.0 cm/sec  Ao max P.1 mmHg  Ao V2 mean: 360.4 cm/sec  Ao mean P.2 mmHg  Ao V2 VTI: 123.3 cm  PAMELLA(I,D): 0.56 cm2  PAMELLA(V,D): 0.56 cm2  AI P1/2t: 482.4 msec  LV V1 max P.6 mmHg  LV V1 max: 62.4 cm/sec  LV V1 VTI: 17.6 cm  SV(LVOT): 68.6 ml  SI(LVOT): 41.7 ml/m2  PA acc time: 0.13 sec  AV Abbe Ratio (DI): 0.14  PAMELLA Index (cm2/m2): 0.34  E/E' av.4  Lateral E/e': 22.8  Medial E/e': 29.9     ______________________________________________________________________________  Report approved by: Bob Castaneda 2021 09:03 AM         Glucose by meter   Result Value Ref Range    GLUCOSE BY METER POCT 91 70 - 99 mg/dL   Potassium   Result Value Ref Range     Potassium 3.7 3.4 - 5.3 mmol/L   Magnesium   Result Value Ref Range    Magnesium 1.8 1.6 - 2.3 mg/dL   Phosphorus   Result Value Ref Range    Phosphorus 3.7 2.5 - 4.5 mg/dL   ABO/Rh type and screen    Narrative    The following orders were created for panel order ABO/Rh type and screen.  Procedure                               Abnormality         Status                     ---------                               -----------         ------                     Adult Type and Screen[[id number]]                                                         Please view results for these tests on the individual orders.

## 2021-08-16 NOTE — PLAN OF CARE
Extubated this afternoon. Propofol and dex gtt off. Alert and oriented. Lethargic. On NC. Clear lung sounds. SR. Echo done. Plan for cath lab. Passed bedside swallow. Sips of water. Lassiter. Lasix given. No BM. Afebrile.  at bedside. Will continue to monitor.

## 2021-08-16 NOTE — PLAN OF CARE
From ER around 1730hrs.  ETT at 22cm lip. On CMV.  Arrived on propofol, precedex added.  Good UOP per dean.     updated by docs.

## 2021-08-16 NOTE — PLAN OF CARE
Neuro: Patient intermittently would sit up and attempt to pull at ETT, therefore remains in non-violent restraints. Inconsistently followed commands. Purposeful movement of all extremities. PERRLA. Overnight required sedation of propofol @ 20 and precedex @ 1.  CV: SR to SB. BP was hypotensive at beginning of shift, requiring phenylephrine drip for 3 hours. Murmur is heard.   Resp: lungs clear. Very scant amount of inline secretions, clear and frothy. Vent setting remains at fio2 40%, peep 5, Vol 420, rr 16.   GI: OG tube was at low intermittent suction at beginning of shift, which resulted in 500 of brown bile liquid output. OG is now clamped and only used for med admin.    : dean catheter in place, adequate output at beginning of shift but greatly decreased over the last four hours. 1095 total urine output.   Labs: Glucose has been trending low, requiring one time dose of D50.   K+ 3.0, replaced via OG with recheck ordered for 1000.     Allie Thomas RN on 8/16/2021 at 6:22 AM

## 2021-08-16 NOTE — CONSULTS
Cardiothoracic Surgery Consult Note    Mayda Lezama MRN# [id number]   Age: 55 year old YOB: 1965     Date of Admission:  8/15/2021    Date of Consult:   8/15/21    Reason for consult: Severe aortic stenosis with bicuspid aortic valve           Assessment and Plan:   Assessment:   Bicuspid aortic valve with severe aortic stenosis and CHF, was intubated on admission, extubated today.  Meets criteria for aortic valve replacement.  Coronary patency and pulmonary pressures not evaluated yet.      Plan:   - Patient needs right and left heart cath for evaluation of PA pressures and Coronary vessels respectively.  - Will schedule the patient for AVR and other necessary procedures this week, will recommend mechanical valve in view of young age.  - Please continue current care, CT Surgery team will follow.  - Patient seen with Dr. Montaño and above plan discussed with her and her , who were in agreement with the above plan.            Chief Complaint:   SOB and cough         History of Present Illness:   Italia Lezama is a 55-year-old female who presented to the ED for shortness of breath and palpitations.  Per ED report, the patient was drinking throughout the day 8/14 and started to have palpitations in the evening.  She continued to have palpitations into the following morning (8/15), and with developing dyspnea and anxiety, presented to the ED.  She has also had a productive cough over the last couple of days and is not COVID vaccinated.  In the ED, the patient received 1 L NS and rapidly developed worsening dyspnea and hypoxia to the 80s, and HR to 140s.  She was emergently intubated for ARF and given total of 60 mg furosemide.  Chest x-ray showed patchy interstitial opacities.  Labs were remarkable for BNP elevated to 3,200, d-dimer of 0.94, troponin to 0.127, grossly normal CBC and BMP aside from Na+ of 131, and EtOH of 0.22.  CTPE and COVID negative. CT PE was negative and ECHO was done which  showed severe aortic stenosis. She was extubated today. Cardiac Surgery was consulted for surgical treatment of catrina bicuspid aortic valve and aortic stenosis.          Past Medical History:   HTN  Bicuspid aortic valve  Aortic stenosis  Anxiety  Migraines        Past Surgical History:   None       Social History:   Chronic smoker for 40 years, 1/2-1 PPD    Social History     Tobacco Use     Smoking status: Current Every Day Smoker     Packs/day: 0.50   Substance Use Topics     Alcohol use: Yes             Family History:   No family history on file.             Allergies:     Allergies   Allergen Reactions     Metoclopramide Other (See Comments)             Medications:     Current Facility-Administered Medications   Medication     acetaminophen (TYLENOL) tablet 650 mg    Or     acetaminophen (TYLENOL) solution 650 mg     albuterol (PROAIR HFA/PROVENTIL HFA/VENTOLIN HFA) 108 (90 Base) MCG/ACT inhaler 6 puff     albuterol (PROAIR HFA/PROVENTIL HFA/VENTOLIN HFA) 108 (90 Base) MCG/ACT inhaler 6 puff     bisacodyl (DULCOLAX) Suppository 10 mg     chlorhexidine (PERIDEX) 0.12 % solution 15 mL     dexmedetomidine (PRECEDEX) 400 mcg in 0.9% sodium chloride 100 mL     glucose gel 15-30 g    Or     dextrose 50 % injection 25-50 mL    Or     glucagon injection 1 mg     enoxaparin ANTICOAGULANT (LOVENOX) injection 40 mg     flumazenil (ROMAZICON) injection 0.2 mg     [START ON 8/18/2021] folic acid (FOLVITE) tablet 1 mg     folic acid injection 1 mg     furosemide (LASIX) injection 20 mg     [START ON 8/23/2021] gabapentin (NEURONTIN) solution 100 mg     [START ON 8/21/2021] gabapentin (NEURONTIN) solution 300 mg     [START ON 8/19/2021] gabapentin (NEURONTIN) solution 600 mg     gabapentin (NEURONTIN) solution 900 mg     haloperidol lactate (HALDOL) injection 5 mg     ipratropium - albuterol 0.5 mg/2.5 mg/3 mL (DUONEB) neb solution 3 mL     LORazepam (ATIVAN) injection 2 mg     magnesium hydroxide (MILK OF MAGNESIA)  suspension 30 mL     metoprolol (LOPRESSOR) injection 5 mg     multivitamin w/minerals (THERA-VIT-M) tablet 1 tablet     ondansetron (ZOFRAN-ODT) ODT tab 4 mg    Or     ondansetron (ZOFRAN) injection 4 mg     pantoprazole (PROTONIX) EC tablet 40 mg    Or     pantoprazole (PROTONIX) 2 mg/mL suspension 40 mg    Or     pantoprazole (PROTONIX) IV push injection 40 mg     phenylephrine (MICHELLE-SYNEPHRINE) 50 mg in NaCl 0.9 % 250 mL infusion PERIPHERAL     prochlorperazine (COMPAZINE) injection 10 mg    Or     prochlorperazine (COMPAZINE) tablet 10 mg    Or     prochlorperazine (COMPAZINE) suppository 25 mg     propofol (DIPRIVAN) infusion     senna-docusate (SENOKOT-S/PERICOLACE) 8.6-50 MG per tablet 1 tablet    Or     senna-docusate (SENOKOT-S/PERICOLACE) 8.6-50 MG per tablet 2 tablet     thiamine (B-1) 200 mg in sodium chloride 0.9 % 50 mL intermittent infusion     [START ON 8/17/2021] thiamine (B-1) tablet 100 mg     [START ON 8/22/2021] thiamine (B-1) tablet 100 mg               Review of Systems:    ROS: 10 point ROS neg other than the symptoms noted above in the HPI.          Physical Exam:   All vitals have been reviewed  Temp:  [96.8  F (36  C)-98.8  F (37.1  C)] 96.8  F (36  C)  Pulse:  [] 64  Resp:  [0-38] 12  BP: ()/() 93/56  FiO2 (%):  [40 %] 40 %  SpO2:  [77 %-100 %] 96 %    Intake/Output Summary (Last 24 hours) at 8/16/2021 1401  Last data filed at 8/16/2021 1200  Gross per 24 hour   Intake 836.21 ml   Output 4245 ml   Net -3408.79 ml     Physical Exam:  GENERAL: appears well, in no acute distress  HEENT:  within normal limits.  NECK:  supple, no lymphadenopathy  CARDIOVASCULAR:  Regular rate and rhythm, normal S1, S2. Systolic murmur +  LUNGS:  bilateral crackles  ABDOMEN:  Soft, nontender, nondistended.  EXTREMITIES:  Negative for edema, cyanosis or clubbing.  NEUROLOGIC:  Alert and oriented x 3 with no focal deficits.          Data:   All laboratory data reviewed    Results:  ISHA  Recent  Labs   Lab 08/16/21  1017 08/16/21  0940 08/16/21  0555 08/16/21  0454 08/16/21  0241 08/15/21  1427   NA  --   --   --  137  --  131*   POTASSIUM 3.7  --   --  3.0*  --  3.9   CHLORIDE  --   --   --  107  --  103   CO2  --   --   --  24  --  20   BUN  --   --   --  12  --  10   CR  --   --   --  0.81  --  0.72   GLC  --  91 83 84 123* 92     CBC  Recent Labs   Lab 08/16/21  0454 08/15/21  1427   WBC 6.3 10.6   HGB 13.0 15.1    302     LFT  Recent Labs   Lab 08/15/21  1427   AST 39   ALT 39   ALKPHOS 97   BILITOTAL 0.7   ALBUMIN 4.2     Recent Labs   Lab 08/16/21  0940 08/16/21  0555 08/16/21  0454 08/16/21  0241 08/16/21  0145 08/16/21  0127   GLC 91 83 84 123* 178* 56*       ECHO: 8/15  1. Left ventricular systolic function is severely reduced. The visual ejection  fraction is 25-30%.  2. The right ventricle is normal in structure, function and size.  3. The left atrium is mildly dilated.  4. Severe aortic stenosis; mean gradient 54 mmHg, peak velocity 4.4 m/sec,  calculated valve area 0.6 cm2. Patient has known history of bicuspid aortic  valve.  5. Compared with the prior report from an outside hospital dated 2/21/18, the  gradients across the aortic valve have further increased and the LV function  is now reduced.         KENDELL KNOWLES MD  Fellow, Cardiothoracic Surgery

## 2021-08-16 NOTE — PROVIDER NOTIFICATION
Provider: Florencio  Method: phone call  Situation: Unable to sedate patient properly due to MAP <65. Propofol currently off and Precedex @ 1  Response: phenylephrine ordered  Allie Thomas RN on 8/15/2021 at 9:56 PM

## 2021-08-16 NOTE — PROGRESS NOTES
Comprehensive Daily ICU Note        Mayda Lezama MRN# [id number]   Age: 55 year old YOB: 1965     Date of Admission: 8/15/2021    Primary care provider: Alaina Griffiths     CODE STATUS: FULL      Problem List:         Active Problems:    Acute respiratory failure with hypoxia (H)    Acute congestive heart failure, unspecified heart failure type (H)    Bicuspid aortic valve    Aortic valve stenosis             Treatment goals for next 24 hours:   Trial of extubation  Cardiology/Cardiothoracic surgery consultation    Leslie Maier MD        Subjective/ Last 24 hours:   Italia Lezama is a 55-year-old female with a history of hypertension, aortic stenosis, and anxiety who presented to the ED for shortness of breath and palpitations.  Per ED report, the patient was drinking throughout the day 8/14 and started to have palpitations in the evening.  She continued to have palpitations into the following morning (8/15), and with developing dyspnea and anxiety, presented to the ED.  She has also had a productive cough over the last couple of days and is not COVID vaccinated.  In the ED, the patient received 1 L NS and rapidly developed worsening dyspnea and hypoxia to the 80s, and HR to 140s.  She was emergently intubated for ARF and given total of 60 mg furosemide.  Chest x-ray showed patchy interstitial opacities.  Labs were remarkable for BNP elevated to 3,200, d-dimer of 0.94, troponin to 0.127, grossly normal CBC and BMP aside from Na+ of 131, and EtOH of 0.22.  CTPE and COVID negative.  Today, 816, patient seen on pressure support.  Breathing comfortably on 5/5.  When sedation is weaned is a little agitated but redirectable.  Per  as far as he knows she is not a daily drinker and has no history of alcohol withdrawal.  Says that prior to her hospitalization in the few days before she came in was having no difficulties with breathing.           Mechanical Ventilation/Vitalsigns/IsandOs:        Temp:  [96.8  F (36  C)-98.6  F (37  C)] 98  F (36.7  C)  Pulse:  [] 70  Resp:  [0-30] 12  BP: ()/(36-89) 106/67  FiO2 (%):  [40 %] 40 %  SpO2:  [91 %-100 %] 98 %      Ventilation Mode: CMV/AC  (Continuous Mandatory Ventilation/ Assist Control)  FiO2 (%): 40 %  Rate Set (breaths/minute): 14 breaths/min  Tidal Volume Set (mL): 420 mL  PEEP (cm H2O): 5 cmH2O  Oxygen Concentration (%): 40 %  Resp: 12      RSBI: low on 5/5    Intake/Output Summary (Last 24 hours) at 8/16/2021 1649  Last data filed at 8/16/2021 1600  Gross per 24 hour   Intake 860.96 ml   Output 3715 ml   Net -2854.04 ml     Net IO Since Admission: -3,504.04 mL [08/16/21 1649]           Physical Examination:     General: Stated age  HEENT: Endotracheal tube, atraumatic  Lungs: Clear anteriorly  CVS: Regular rate and rhythm systolic murmur  Abdomen: Benign warm, no edema, good pulses  Extremities/musculoskeletal: normal  Neurology: When sedation is weaned marks slightly anxious and agitated but is able to be calmed down and follows commands   Skin: normal  Psychiatry: as above  Exam of Line sites: No lines           Feeding/Glucose:     Orders Placed This Encounter      NPO for Medical/Clinical Reasons Except for: No Exceptions        Recent Labs   Lab 08/16/21  0940 08/16/21  0555 08/16/21  0454 08/16/21  0241 08/16/21  0145 08/16/21  0127   GLC 91 83 84 123* 178* 56*                Medications:       albuterol  6 puff Inhalation Q4H     chlorhexidine  15 mL Mouth/Throat Q12H     enoxaparin ANTICOAGULANT  40 mg Subcutaneous Q24H     [START ON 8/18/2021] folic acid  1 mg Oral Daily     folic acid  1 mg Intravenous Daily     [START ON 8/23/2021] gabapentin  100 mg NG or Feeding tube Q8H     [START ON 8/21/2021] gabapentin  300 mg NG or Feeding tube Q8H     [START ON 8/19/2021] gabapentin  600 mg NG or Feeding tube Q8H     gabapentin  900 mg NG or Feeding tube Q8H     LORazepam  2 mg Intravenous Once     multivitamin w/minerals  1 tablet  Oral Daily     pantoprazole  40 mg Oral QAM AC    Or     pantoprazole  40 mg Per Feeding Tube QAM AC    Or     pantoprazole (PROTONIX) IV  40 mg Intravenous QAM AC     thiamine  200 mg Intravenous TID     [START ON 8/17/2021] thiamine  100 mg Oral TID     [START ON 8/22/2021] thiamine  100 mg Oral Daily          dexmedetomidine Stopped (08/16/21 1323)     phenylephrine Stopped (08/16/21 0100)     propofol (DIPRIVAN) infusion Stopped (08/16/21 1242)              Labs:         ROUTINE ICU LABS (Last four results)  CMP  Recent Labs   Lab 08/16/21  1017 08/16/21  0940 08/16/21  0555 08/16/21  0454 08/16/21  0241 08/15/21  2139 08/15/21  1427   NA  --   --   --  137  --   --  131*   POTASSIUM 3.7  --   --  3.0*  --   --  3.9   CHLORIDE  --   --   --  107  --   --  103   CO2  --   --   --  24  --   --  20   ANIONGAP  --   --   --  6  --   --  8   GLC  --  91 83 84 123*  --  92   BUN  --   --   --  12  --   --  10   CR  --   --   --  0.81  --   --  0.72   GFRESTIMATED  --   --   --  81  --   --  >90   KARLY  --   --   --  7.7*  --   --  8.8   MAG 1.8  --   --   --   --  1.7 2.0   PHOS 3.7  --   --   --   --  5.0*  --    PROTTOTAL  --   --   --   --   --   --  7.5   ALBUMIN  --   --   --   --   --   --  4.2   BILITOTAL  --   --   --   --   --   --  0.7   ALKPHOS  --   --   --   --   --   --  97   AST  --   --   --   --   --   --  39   ALT  --   --   --   --   --   --  39     CBC  Recent Labs   Lab 08/16/21  0454 08/15/21  1427   WBC 6.3 10.6   RBC 4.05 4.62   HGB 13.0 15.1   HCT 38.1 44.0   MCV 94 95   MCH 32.1 32.7   MCHC 34.1 34.3   RDW 12.4 12.7    302     INRNo lab results found in last 7 days.  Arterial Blood Gas  Recent Labs   Lab 08/15/21  1834   PH 7.30*   PCO2 43   PO2 87   HCO3 21   O2PER 40         Cultures:  No results for input(s): CULT in the last 168 hours.  Blood culture:  Invalid input(s): BC   Urine culture:  No results for input(s): URC in the last 168 hours.          Imaging/Other results:      Recent Results (from the past 24 hour(s))   XR Chest Port 1 View    Narrative    XR CHEST PORT 1 VIEW   2021 7:46 AM     HISTORY: pulm edema    COMPARISON: Chest CT 8/15/2021      Impression    IMPRESSION: Endotracheal tube tip 3.4 cm above the sophia. Enteric  tube tip below the diaphragm. Increased retrocardiac left lower lung  consolidation which may represent compressive atelectasis or  pneumonia. Unchanged small left and trace right pleural effusions.  Decreased pulmonary venous congestion. Normal heart size. No  pneumothorax.    NEELAM TUBBS MD         SYSTEM ID:  [id number]   Echocardiogram Complete   Result Value    LVEF  25-30%    Narrative    [id number]  [id number]  [id number]  [id number]^DIONI^KARL^ANNAMARIA     [facility]  Echocardiography Laboratory  [address] [place] [address]     Name: MIKHAIL ESPINAL  MRN: [id number]  : 1965  Study Date: 2021 07:56 AM  Age: 55 yrs  Gender: Female  Patient Location: [facility]  Reason For Study: Respiratory Failure, Aortic Valve Disorder  Ordering Physician: KARL WHEATLEY  Referring Physician: KARL WHEATLEY  Performed By: NEGRO Garcia     BSA: 1.6 m2  Height: 65 in  Weight: 130 lb  HR: 62  BP: 107/71 mmHg  ______________________________________________________________________________  Procedure  Complete Portable Echo Adult.  ______________________________________________________________________________  Interpretation Summary     1. Left ventricular systolic function is severely reduced. The visual ejection  fraction is 25-30%.  2. The right ventricle is normal in structure, function and size.  3. The left atrium is mildly dilated.  4. Severe aortic stenosis; mean gradient 54 mmHg, peak velocity 4.4 m/sec,  calculated valve area 0.6 cm2. Patient has known history of bicuspid aortic  valve.  5. Compared with the prior report from an outside hospital dated 18, the  gradients across the  aortic valve have further increased and the LV function  is now reduced.  ______________________________________________________________________________  Left Ventricle  The left ventricle is normal in size. There is mild concentric left  ventricular hypertrophy. Left ventricular systolic function is severely  reduced. The visual ejection fraction is 25-30%. There is severe global  hypokinesia of the left ventricle.     Right Ventricle  The right ventricle is normal in structure, function and size.     Atria  The left atrium is mildly dilated. Right atrial size is normal.     Mitral Valve  The mitral valve is normal in structure and function. There is mild (1+)  mitral regurgitation.     Tricuspid Valve  The tricuspid valve is normal in structure and function. There is trace  tricuspid regurgitation.     Aortic Valve  There is mild (1+) aortic regurgitation. Severe valvular aortic stenosis.  Severe aortic stenosis; mean gradient 54 mmHg, peak velocity 4.4 m/sec,  calculated valve area 0.6 cm2. Patient has known history of bicuspid aortic  valve.     Pulmonic Valve  The pulmonic valve is normal in structure and function.     Vessels  Normal size ascending aorta. Dilated IVC; patient intubated.     Pericardium  There is no pericardial effusion.     Rhythm  Sinus rhythm was noted.  ______________________________________________________________________________  MMode/2D Measurements & Calculations  IVSd: 1.2 cm     LVIDd: 4.9 cm  LVIDs: 4.7 cm  LVPWd: 1.1 cm  FS: 5.2 %  LV mass(C)d: 223.1 grams  LV mass(C)dI: 135.4 grams/m2  Ao root diam: 2.6 cm  asc Aorta Diam: 3.3 cm  LVOT diam: 2.2 cm  LVOT area: 3.9 cm2  LA Volume (BP): 54.7 ml  LA Volume Index (BP): 33.2 ml/m2  RWT: 0.45     Doppler Measurements & Calculations  MV E max aracelis: 92.5 cm/sec  MV A max aracelis: 92.5 cm/sec  MV E/A: 1.0  MV max PG: 3.2 mmHg  MV mean P.7 mmHg  MV V2 VTI: 35.9 cm  MVA(VTI): 1.9 cm2  MV dec time: 0.21 sec  Ao V2 max: 436.0 cm/sec  Ao max PG:  76.1 mmHg  Ao V2 mean: 360.4 cm/sec  Ao mean P.2 mmHg  Ao V2 VTI: 123.3 cm  PAMELLA(I,D): 0.56 cm2  PAMELLA(V,D): 0.56 cm2  AI P1/2t: 482.4 msec  LV V1 max P.6 mmHg  LV V1 max: 62.4 cm/sec  LV V1 VTI: 17.6 cm  SV(LVOT): 68.6 ml  SI(LVOT): 41.7 ml/m2  PA acc time: 0.13 sec  AV Abbe Ratio (DI): 0.14  PAMELLA Index (cm2/m2): 0.34  E/E' av.4  Lateral E/e': 22.8  Medial E/e': 29.9     ______________________________________________________________________________  Report approved by: Bob Castaneda 2021 09:03 AM                  Assessment and Plan:     Neurology/Psychiatry:   #Sedated to tolerate mechanical ventilation.  Reports of being anxious prior to intubation.  At the current time no definitive signs of alcohol withdrawal.  Plan  - Acetaminophen PRN  - Hold PTA duloxetine and lorazepam  - Delirium precautions  - CIWA protocol      Cardiovascular:   # Acute decompensated congestive Heart Failure secondary to aortic Stenosis with severely reduced ejection fraction.  Net fluid balance is now about 2.5 L negative.  No signs at this time of significant volume overload.  Plan  -1 more dose of furosemide.  -Cardiology and cardiothoracic surgery consultations for planning of aortic valve replacement.  -Mean for net even fluid balance.      Pulmonary/Ventilator Management:   Intubated. Currently saturating 100% on CMV/AC with FiO2 40%, PEEP 5 mmHg,  mL, RR 16.  Tolerating pressure support trial with adequate R SBI  Plan  -Trial of extubation      GI and Nutrition:   No issues.  Plan  -NPO      Renal/Fluids/Electrolytes:   # Lactic Acidosis. Lactate in the ED to 3.1, likely to due acute CHF. ABG with pH of 7.30.  BMP wnl and creatinine of 0.72.  Plan  - Banana bag 1 L at 75 mL/hr  - monitor function and electrolytes as needed with replacement per ICU protocols.   - generally avoid nephrotoxic agents  - Continue Lassiter  - Follow I/O's as appropriate  -Recheck magnesium potassium and  phosphorus.      Infectious Disease:   No issues.      Endocrine:   No issues.  Plan  - ICU insulin protocol, goal sugar <180      Hematology/Oncology:   No issues.      IV/Access:   Venous access - pIV x3    ICU Prophylaxis:   1. DVT: LMWH and mechanical  2. VAP: HOB 30 degrees, chlorhexidine rinse  3. Stress Ulcer: PPI  4. Restraints: Nonviolent soft two point restraints required and necessary for patient safety and continued cares and good effect as patient continues to pull at necessary lines, tubes despite education and distraction. Will readdress daily.   5. Wound care: Per unit protocol  6. Feeding: NPO  7. Family Update: Updated  at bedside  8. Disposition - ICU      Billing: total time spend providing critical care was 45 min, excluding procedure time.    Leslie Maier MD  [phone]

## 2021-08-17 ENCOUNTER — PREP FOR PROCEDURE (OUTPATIENT)
Dept: CARDIOLOGY | Facility: CLINIC | Age: 56
End: 2021-08-17

## 2021-08-17 LAB
ANION GAP SERPL CALCULATED.3IONS-SCNC: 7 MMOL/L (ref 3–14)
BACTERIA UR CULT: NO GROWTH
BUN SERPL-MCNC: 15 MG/DL (ref 7–30)
CALCIUM SERPL-MCNC: 8.6 MG/DL (ref 8.5–10.1)
CHLORIDE BLD-SCNC: 105 MMOL/L (ref 94–109)
CO2 SERPL-SCNC: 26 MMOL/L (ref 20–32)
COHGB MFR BLD: 97 % (ref 92–100)
CREAT SERPL-MCNC: 0.86 MG/DL (ref 0.52–1.04)
GFR SERPL CREATININE-BSD FRML MDRD: 76 ML/MIN/1.73M2
GLUCOSE BLD-MCNC: 84 MG/DL (ref 70–99)
GLUCOSE BLDC GLUCOMTR-MCNC: 155 MG/DL (ref 70–99)
HCO3 BLDA-SCNC: 24 MMOL/L (ref 21–28)
HCO3 BLDV-SCNC: 27 MMOL/L (ref 21–28)
HOLD SPECIMEN: NORMAL
LACTATE BLD-SCNC: 0.4 MMOL/L
LACTATE BLD-SCNC: 0.4 MMOL/L
MAGNESIUM SERPL-MCNC: 2 MG/DL (ref 1.6–2.3)
PCO2 BLDA: 34 MM HG (ref 35–45)
PCO2 BLDV: 40 MM HG (ref 40–50)
PH BLDA: 7.46 [PH] (ref 7.35–7.45)
PH BLDV: 7.44 [PH] (ref 7.32–7.43)
PO2 BLDA: 86 MM HG (ref 80–105)
PO2 BLDV: 37 MM HG (ref 25–47)
POTASSIUM BLD-SCNC: 3.5 MMOL/L (ref 3.4–5.3)
POTASSIUM BLD-SCNC: 3.6 MMOL/L (ref 3.4–5.3)
SAO2 % BLDV: 72 % (ref 94–100)
SODIUM SERPL-SCNC: 138 MMOL/L (ref 133–144)

## 2021-08-17 PROCEDURE — 272N000001 HC OR GENERAL SUPPLY STERILE: Performed by: INTERNAL MEDICINE

## 2021-08-17 PROCEDURE — 250N000013 HC RX MED GY IP 250 OP 250 PS 637: Performed by: INTERNAL MEDICINE

## 2021-08-17 PROCEDURE — 250N000009 HC RX 250: Performed by: INTERNAL MEDICINE

## 2021-08-17 PROCEDURE — 250N000011 HC RX IP 250 OP 636: Performed by: INTERNAL MEDICINE

## 2021-08-17 PROCEDURE — 99232 SBSQ HOSP IP/OBS MODERATE 35: CPT | Performed by: INTERNAL MEDICINE

## 2021-08-17 PROCEDURE — 80048 BASIC METABOLIC PNL TOTAL CA: CPT | Performed by: INTERNAL MEDICINE

## 2021-08-17 PROCEDURE — 82803 BLOOD GASES ANY COMBINATION: CPT

## 2021-08-17 PROCEDURE — 99223 1ST HOSP IP/OBS HIGH 75: CPT | Performed by: INTERNAL MEDICINE

## 2021-08-17 PROCEDURE — 250N000013 HC RX MED GY IP 250 OP 250 PS 637: Performed by: NURSE PRACTITIONER

## 2021-08-17 PROCEDURE — 93456 R HRT CORONARY ARTERY ANGIO: CPT | Performed by: INTERNAL MEDICINE

## 2021-08-17 PROCEDURE — 36415 COLL VENOUS BLD VENIPUNCTURE: CPT | Performed by: INTERNAL MEDICINE

## 2021-08-17 PROCEDURE — 99233 SBSQ HOSP IP/OBS HIGH 50: CPT | Mod: 25 | Performed by: INTERNAL MEDICINE

## 2021-08-17 PROCEDURE — 83735 ASSAY OF MAGNESIUM: CPT | Performed by: INTERNAL MEDICINE

## 2021-08-17 PROCEDURE — 258N000003 HC RX IP 258 OP 636: Performed by: INTERNAL MEDICINE

## 2021-08-17 PROCEDURE — B2111ZZ FLUOROSCOPY OF MULTIPLE CORONARY ARTERIES USING LOW OSMOLAR CONTRAST: ICD-10-PCS | Performed by: INTERNAL MEDICINE

## 2021-08-17 PROCEDURE — 250N000009 HC RX 250: Performed by: NURSE PRACTITIONER

## 2021-08-17 PROCEDURE — 93456 R HRT CORONARY ARTERY ANGIO: CPT | Mod: 26 | Performed by: INTERNAL MEDICINE

## 2021-08-17 PROCEDURE — 210N000002 HC R&B HEART CARE

## 2021-08-17 PROCEDURE — 4A023N6 MEASUREMENT OF CARDIAC SAMPLING AND PRESSURE, RIGHT HEART, PERCUTANEOUS APPROACH: ICD-10-PCS | Performed by: INTERNAL MEDICINE

## 2021-08-17 RX ORDER — NALOXONE HYDROCHLORIDE 0.4 MG/ML
0.2 INJECTION, SOLUTION INTRAMUSCULAR; INTRAVENOUS; SUBCUTANEOUS
Status: DISCONTINUED | OUTPATIENT
Start: 2021-08-17 | End: 2021-08-24 | Stop reason: HOSPADM

## 2021-08-17 RX ORDER — DULOXETIN HYDROCHLORIDE 20 MG/1
20 CAPSULE, DELAYED RELEASE ORAL DAILY
Status: CANCELLED | OUTPATIENT
Start: 2021-08-17

## 2021-08-17 RX ORDER — ATROPINE SULFATE 0.1 MG/ML
0.5 INJECTION INTRAVENOUS
Status: ACTIVE | OUTPATIENT
Start: 2021-08-17 | End: 2021-08-17

## 2021-08-17 RX ORDER — FLUMAZENIL 0.1 MG/ML
0.2 INJECTION, SOLUTION INTRAVENOUS
Status: ACTIVE | OUTPATIENT
Start: 2021-08-17 | End: 2021-08-17

## 2021-08-17 RX ORDER — NALOXONE HYDROCHLORIDE 0.4 MG/ML
0.4 INJECTION, SOLUTION INTRAMUSCULAR; INTRAVENOUS; SUBCUTANEOUS
Status: DISCONTINUED | OUTPATIENT
Start: 2021-08-17 | End: 2021-08-24 | Stop reason: HOSPADM

## 2021-08-17 RX ORDER — LORAZEPAM 0.5 MG/1
0.5 TABLET ORAL
Status: DISCONTINUED | OUTPATIENT
Start: 2021-08-17 | End: 2021-08-17 | Stop reason: HOSPADM

## 2021-08-17 RX ORDER — NALOXONE HYDROCHLORIDE 0.4 MG/ML
0.4 INJECTION, SOLUTION INTRAMUSCULAR; INTRAVENOUS; SUBCUTANEOUS
Status: DISCONTINUED | OUTPATIENT
Start: 2021-08-17 | End: 2021-08-17

## 2021-08-17 RX ORDER — POTASSIUM CHLORIDE 1500 MG/1
20 TABLET, EXTENDED RELEASE ORAL
Status: DISCONTINUED | OUTPATIENT
Start: 2021-08-17 | End: 2021-08-17 | Stop reason: HOSPADM

## 2021-08-17 RX ORDER — OXYCODONE HYDROCHLORIDE 5 MG/1
10 TABLET ORAL EVERY 4 HOURS PRN
Status: DISCONTINUED | OUTPATIENT
Start: 2021-08-17 | End: 2021-08-19

## 2021-08-17 RX ORDER — ACETAMINOPHEN 325 MG/1
650 TABLET ORAL EVERY 4 HOURS PRN
Status: DISCONTINUED | OUTPATIENT
Start: 2021-08-17 | End: 2021-08-17

## 2021-08-17 RX ORDER — NALOXONE HYDROCHLORIDE 0.4 MG/ML
0.2 INJECTION, SOLUTION INTRAMUSCULAR; INTRAVENOUS; SUBCUTANEOUS
Status: DISCONTINUED | OUTPATIENT
Start: 2021-08-17 | End: 2021-08-17

## 2021-08-17 RX ORDER — POTASSIUM CHLORIDE 1500 MG/1
20 TABLET, EXTENDED RELEASE ORAL
Status: COMPLETED | OUTPATIENT
Start: 2021-08-17 | End: 2021-08-17

## 2021-08-17 RX ORDER — ASPIRIN 81 MG/1
81 TABLET, CHEWABLE ORAL
Status: CANCELLED | OUTPATIENT
Start: 2021-08-17

## 2021-08-17 RX ORDER — LORAZEPAM 2 MG/ML
0.5 INJECTION INTRAMUSCULAR
Status: DISCONTINUED | OUTPATIENT
Start: 2021-08-17 | End: 2021-08-17 | Stop reason: HOSPADM

## 2021-08-17 RX ORDER — ASPIRIN 81 MG/1
162 TABLET, CHEWABLE ORAL
Status: CANCELLED | OUTPATIENT
Start: 2021-08-17

## 2021-08-17 RX ORDER — IOPAMIDOL 755 MG/ML
INJECTION, SOLUTION INTRAVASCULAR
Status: DISCONTINUED | OUTPATIENT
Start: 2021-08-17 | End: 2021-08-17 | Stop reason: HOSPADM

## 2021-08-17 RX ORDER — ASPIRIN 81 MG/1
243 TABLET, CHEWABLE ORAL ONCE
Status: COMPLETED | OUTPATIENT
Start: 2021-08-17 | End: 2021-08-17

## 2021-08-17 RX ORDER — LIDOCAINE 40 MG/G
CREAM TOPICAL
Status: DISCONTINUED | OUTPATIENT
Start: 2021-08-17 | End: 2021-08-17 | Stop reason: HOSPADM

## 2021-08-17 RX ORDER — SODIUM CHLORIDE 9 MG/ML
75 INJECTION, SOLUTION INTRAVENOUS CONTINUOUS
Status: ACTIVE | OUTPATIENT
Start: 2021-08-17 | End: 2021-08-17

## 2021-08-17 RX ORDER — OXYCODONE HYDROCHLORIDE 5 MG/1
5 TABLET ORAL EVERY 4 HOURS PRN
Status: DISCONTINUED | OUTPATIENT
Start: 2021-08-17 | End: 2021-08-19

## 2021-08-17 RX ORDER — FENTANYL CITRATE 50 UG/ML
25 INJECTION, SOLUTION INTRAMUSCULAR; INTRAVENOUS
Status: COMPLETED | OUTPATIENT
Start: 2021-08-17 | End: 2021-08-19

## 2021-08-17 RX ORDER — ASPIRIN 325 MG
325 TABLET ORAL ONCE
Status: COMPLETED | OUTPATIENT
Start: 2021-08-17 | End: 2021-08-17

## 2021-08-17 RX ADMIN — ENOXAPARIN SODIUM 40 MG: 40 INJECTION SUBCUTANEOUS at 20:21

## 2021-08-17 RX ADMIN — GABAPENTIN 900 MG: 300 CAPSULE ORAL at 11:23

## 2021-08-17 RX ADMIN — PANTOPRAZOLE SODIUM 40 MG: 40 TABLET, DELAYED RELEASE ORAL at 08:48

## 2021-08-17 RX ADMIN — HYDROXYZINE HYDROCHLORIDE 25 MG: 25 TABLET ORAL at 17:26

## 2021-08-17 RX ADMIN — GABAPENTIN 900 MG: 300 CAPSULE ORAL at 20:21

## 2021-08-17 RX ADMIN — GABAPENTIN 900 MG: 300 CAPSULE ORAL at 04:02

## 2021-08-17 RX ADMIN — HYDROXYZINE HYDROCHLORIDE 25 MG: 25 TABLET ORAL at 08:48

## 2021-08-17 RX ADMIN — FOLIC ACID 1 MG: 5 INJECTION, SOLUTION INTRAMUSCULAR; INTRAVENOUS; SUBCUTANEOUS at 11:23

## 2021-08-17 RX ADMIN — POTASSIUM CHLORIDE 20 MEQ: 1500 TABLET, EXTENDED RELEASE ORAL at 08:56

## 2021-08-17 RX ADMIN — THIAMINE HYDROCHLORIDE 200 MG: 100 INJECTION, SOLUTION INTRAMUSCULAR; INTRAVENOUS at 17:17

## 2021-08-17 RX ADMIN — THIAMINE HYDROCHLORIDE 200 MG: 100 INJECTION, SOLUTION INTRAMUSCULAR; INTRAVENOUS at 11:23

## 2021-08-17 RX ADMIN — ASPIRIN 325 MG ORAL TABLET 325 MG: 325 PILL ORAL at 08:57

## 2021-08-17 RX ADMIN — HYDROXYZINE HYDROCHLORIDE 25 MG: 25 TABLET ORAL at 23:33

## 2021-08-17 RX ADMIN — MULTIPLE VITAMINS W/ MINERALS TAB 1 TABLET: TAB at 08:48

## 2021-08-17 RX ADMIN — THIAMINE HCL TAB 100 MG 100 MG: 100 TAB at 20:21

## 2021-08-17 ASSESSMENT — ACTIVITIES OF DAILY LIVING (ADL)
ADLS_ACUITY_SCORE: 18
ADLS_ACUITY_SCORE: 19
ADLS_ACUITY_SCORE: 18
ADLS_ACUITY_SCORE: 19

## 2021-08-17 ASSESSMENT — MIFFLIN-ST. JEOR: SCORE: 1184.88

## 2021-08-17 NOTE — PRE-PROCEDURE
GENERAL PRE-PROCEDURE:   Procedure:  Left and right heart catheterization, coronary angiogram  Date/Time:  8/17/2021 9:36 AM    Verbal consent obtained?: Yes    Risks and benefits: Risks, benefits and alternatives were discussed    Patient states understanding of procedure being performed: Yes    Patient's understanding of procedure matches consent: Yes    Procedure consent matches procedure scheduled: Yes    Expected level of sedation:  Moderate  Appropriately NPO:  Yes  Mallampati  :  Grade 3- soft palate visible, posterior pharyngeal wall not visible  Lungs:  Lungs clear with good breath sounds bilaterally  Heart:  Normal heart sounds and rate  History & Physical reviewed:  History and physical reviewed and no updates needed  Statement of review:  I have reviewed the lab findings, diagnostic data, medications, and the plan for sedation

## 2021-08-17 NOTE — INTERIM SUMMARY
Service Date: 08/17/2021    PRIMARY CARE PHYSICIAN:  Dr. Alaina Griffiths.    CODE STATUS:  Full code.    CHIEF COMPLAINT:  Critical aortic stenosis.    HISTORY OF PRESENT ILLNESS:  Ms. Lezama is a 55-year-old female with a past medical history significant for bicuspid aortic valve with longstanding AS, migraine headaches, hypertension, and anxiety, who presents to the Emergency Department on 08/15/2021 with concerns of shortness of breath and palpitations and found to be in acute hypoxic respiratory failure, so was quickly intubated.  The patient on Saturday had been drinking with her friends and drank quite a bit more than is typical for her.  The patient notes that she was having palpitations as the night went on and was reportedly found by her  over the toilet with a bottle of vodka.  The patient states she has had palpitations intermittently even leading up to this, but was in otherwise a fairly normal state of health until Saturday.  She presented to the ED with quite a bit of shortness of breath and was found to be hypoxic and tachycardic into the 140s, with concern for impending respiratory failure, so was intubated.  The patient also was found to have a blood alcohol level of 0.22.  She was intubated and sedated with propofol and admitted to the ICU.  She seemed to have evidence of pulmonary edema based upon findings of chest x-ray.  She was diuresed with IV Lasix.  Echocardiogram subsequently revealed critical aortic stenosis and progression of her previously known aortic stenosis.  She also had acutely decompensated heart failure with an EF of 25% to 30%, which was new.  Aortic stenosis showed a valve area of 0.6 cm2 and a peak velocity of 4.4 meters/second, which had progressed from prior.  Cardiology and CV surgery were consulted and tentatively is scheduled for valve replacement on Thursday as it is felt that in her acutely decompensated state she is not safe to discharge to home with  outpatient surgery.  The patient has subsequently been extubated.  When I saw her today, she was lying in bed after an angiogram today feeling fairly well.  She no longer has any shortness of breath.  Denies any palpitations or chest pressure.    The angiogram done today did show a 20% disease in a few different places, but no critical ischemic disease.    I confirmed with the nurse that the plan for right now is for surgery on Thursday with a surgical aortic valve replacement.  She has been initiated on gabapentin for concerns of alcohol withdrawal.  At this point, she has not had any definitive alcohol withdrawal as she has been on propofol, but continues on multivitamin, thiamine and folate as well as Neurontin.  In discussion with the patient and review of the records, it sounds like she potentially is minimizing her alcohol use.  She states that she has a few drinks per week, but did not really quantify that.  She also notes that she does drink more extensively on occasion, like she did this last weekend.  Initially, there was some thought that she may be drinking only intermittently; however, there was also report that her  found an empty bottle of vodka in the closet suggesting the possibility of increased use that was not readily apparent.  The patient was reportedly okay with seeing a CD counselor.      The patient also notes that she was recently started on medications for hypertension and anxiety, including losartan, Cymbalta and Ativan, but has not yet taken any of these.    PAST MEDICAL HISTORY:     1.  Critical aortic stenosis with valve area of 0.6 cm2 and peak velocity of 4.4 meters/second.  2.  Migraine headaches.  3.  Alcohol use.  4.  Hypertension.  5.  Anxiety.    PRIOR TO ADMISSION MEDICATIONS:    1.  Duloxetine 20 mg daily.  2.  Ativan 0.5 mg q. 6 hours p.r.n.  3.  Losartan 50 mg daily.  -- The patient reports she has not yet taken any of these medications.    SOCIAL HISTORY:  The  patient does smoke 1 pack of cigarettes per day and has been doing this for over 20 years.  Alcohol use as discussed above.    FAMILY HISTORY:  Father had a stroke and AAA with repair.    ALLERGIES:  NO KNOWN DRUG ALLERGIES.    REVIEW OF SYSTEMS:  A complete review of systems reviewed and negative, save for the pertinent positives, which recorded in the HPI.    PHYSICAL EXAMINATION:    VITAL SIGNS:  Blood pressure of 111/63, heart rate 83, respirations 13, saturating 93% on room air, temperature 99.3 degrees Fahrenheit.  GENERAL:  The patient is lying in bed after her angiogram, resting comfortably.  HEENT:  Pupils equal, round, reactive to light, extraocular muscle function intact.  No scleral icterus.  Oropharynx is clear.  NECK:  No lymphadenopathy or thyromegaly.  CARDIOVASCULAR:  Heart is regular rate and rhythm with a loud 3-4/6 systolic murmur heard throughout.  PULMONARY:  Lungs are clear to auscultation bilaterally without wheeze or rhonchi.  GASTROINTESTINAL:  Positive bowel sounds, soft, nontender, nondistended.  SKIN:  No rashes or lesions.  LYMPHATICS:  No peripheral edema.  PSYCHIATRIC:  Alert and oriented x 3, normal affect.  NEUROLOGIC:  Cranial nerves II-XII grossly intact.  No focal deficits.    LABORATORIES:  CBC and BMP are unremarkable.  Troponin was 0.272.  D-dimer elevated to 0.94.  CT of the chest with contrast was negative for PE, did show small bilateral pleural effusions and multiple patchy nodular pulmonary opacities as well as scattered areas of interlobular septal thickening suggestive of possible pulmonary edema.    Echocardiogram shows a newly reduced EF of 25-30% with a normal right ventricle in structure, function and size.  There is severe aortic stenosis with mean gradient of 54 mmHg, peak velocity of 4.4 meters/second and a calculated valve area of 0.6 cm2. Bicuspid aortic valve is noted. In comparison, there is a newly depressed EF and worsening of the aortic  stenosis.    Angiogram done on 08/17/2021 shows moderate pulmonary hypertension as well as ostial and proximal LAD lesions of 20% and a proximal RCA lesion at 25%.    BMP noted to be elevated to 3200.    ASSESSMENT AND PLAN:  Ms. Lezama is a 55-year-old female with a past medical history significant for known bicuspid aortic valve with aortic stenosis, alcohol use, hypertension and anxiety, who presents to the Emergency Department with shortness of breath, palpitations and found to have critical aortic stenosis.  1.  Acute hypoxic respiratory failure due to acutely decompensated congestive heart failure from critical aortic stenosis:  The patient's respiratory status has improved with IV diuresis and she has subsequently been extubated.  Cardiology and CV surgery are following and the patient is tentatively scheduled for a surgical aortic valve replacement on Thursday, 08/19/2021, as she is too tenuous to discharge to home.  Cardiology has recommended against any scheduled diuresis given her tenuous hemodynamics.  Echocardiogram as above shows a newly depressed EF of 25-30% with severe aortic stenosis and a calculated valve area of 0.6 cm2, mean gradient of 54 mmHg and peak velocity of 4.4 meters/second.  The patient will continue to be in the hospital until surgery on Thursday.  We will monitor hemodynamics closely as well as her respiratory and volume status.  Appreciate Cardiology and CV Surgery consult.  2.  Non-ST elevation myocardial infarction:  The patient had an angiogram showing minimal disease at 20% in the LAD and RCA, suggesting this is demand ischemia from the critical aortic stenosis and acutely decompensated CHF.  Continue with aspirin and surgical plan as above.  3.  Alcohol use:  The extent of her drinking is not entirely clear and she has not definitively gone through any alcohol withdrawal though was sedated with propofol, so we will certainly need to continue to monitor.  There is some concern  that she is drinking more than she lets on.  So, for now, we will continue with Neurontin, multivitamin, thiamine and folate and monitor closely for evidence of alcohol withdrawal.  I did  her today on the need to minimize alcohol use going forward and CD counselor has been requested.  4.  Depression with anxiety colon: Cymbalta is on her PTA list, but she has not yet began this.  We will consider starting this prior to discharge.  5.  Tobacco dependence:  The patient smokes 1 pack of cigarettes per day and was counseled on the need to quit.  She declined nicotine patch at this point.  6.  Deep venous thrombosis prophylaxis:  Enoxaparin, which can be held the night before surgery.  7.  Disposition:  Surgical aortic valve replacement is scheduled for Thursday of this week and would anticipate an additional 4-5 days after that, so potentially discharge would be early next week.      Petar Emery DO        D: 2021   T: 2021   MT: PAKMT    Name:     MIKHAIL ESPINALAraceli  MRN:      [id number]        Account:      [id number]   :      1965           Service Date: 2021       Document: [id number]    cc:  Alaina Griffiths MD

## 2021-08-17 NOTE — PROGRESS NOTES
[facility]    Cardiology Progress Note     Assessment & Plan     This is a 55 year old female with HTN, bicuspid aortic valve stenosis, anxiety who presents with shortness of breath and palpitations. In the ER she was given 1 liter NS and became hypoxic to the 80s with HR in the 140s, ended up urgently intubated and was then given IV lasix 60 mg when CXR showed pulmonary edema. BNP was elevated to 3200 and D-Dimer 0.94, TN 0.127. Her Na was 131. CTPE was negative for PE. COVID test initially negative. Admitted to ICU and cardiology consulted. Echocardiogram demonstrated severely reduced LVEF 25-30%, aortic stenosis with mean gradient 54 mmHg, PAMELLA 0.6 cm2 in setting of low flow, findings consistent with critical aortic stenosis.     Spent lengthy discussion with  and ICU intensivist. She is in critical condition with her aortic stenosis and am unsure she can be medically optimized and safely discharged to an elective aortic valve repair situation. She did well with gentle diuresis and extubated, overall today doing well. Given young age we are opting for SAVR. She is s/p RHC and LHC today.      Will continue to follow.       Jen Amor MD  Text Page  (Monday - Friday, 8 am- 5 pm)    Interval History     Extubated. S/p RHC and LHC. Doing well today.    Physical Exam   Temp: 99.5  F (37.5  C) Temp src: Bladder BP: 123/72 Pulse: 85   Resp: 21 SpO2: 96 % O2 Device: Nasal cannula Oxygen Delivery: 3 LPM  Vitals:    08/15/21 1815 08/16/21 0400 08/17/21 0400   Weight: 57.5 kg (126 lb 12.2 oz) 59.3 kg (130 lb 11.7 oz) 59.4 kg (130 lb 15.3 oz)     Vital Signs with Ranges  Temp:  [97.6  F (36.4  C)-100.6  F (38.1  C)] 99.5  F (37.5  C)  Pulse:  [] 85  Resp:  [10-26] 21  BP: ()/(54-88) 123/72  FiO2 (%):  [40 %] 40 %  SpO2:  [91 %-98 %] 96 %  I/O last 3 completed shifts:  In: 736.63 [P.O.:440; I.V.:136.63; NG/GT:160]  Out: 2795 [Urine:2795]  Patient Active Problem List    Diagnosis     Acute respiratory failure with hypoxia (H)     Acute congestive heart failure, unspecified heart failure type (H)     Bicuspid aortic valve     Aortic valve stenosis       Constitutional: Awake, alert, cooperative, no apparent distress  Respiratory: Clear to auscultation bilaterally, no crackles or wheezing  Cardiovascular: Regular rate and rhythm, diminished S2, VENUS RUSB.  GI: Normal bowel sounds, soft, non-distended, non-tender  Skin/Integumen: No rashes, no cyanosis, no edema  Other:      Medications     dexmedetomidine Stopped (08/16/21 1323)     phenylephrine Stopped (08/16/21 0100)     propofol (DIPRIVAN) infusion Stopped (08/16/21 1242)     sodium chloride         enoxaparin ANTICOAGULANT  40 mg Subcutaneous Q24H     [START ON 8/18/2021] folic acid  1 mg Oral Daily     folic acid  1 mg Intravenous Daily     [START ON 8/23/2021] gabapentin  100 mg Oral Q8H     [START ON 8/21/2021] gabapentin  300 mg Oral Q8H     [START ON 8/19/2021] gabapentin  600 mg Oral Q8H     gabapentin  900 mg Oral Q8H     LORazepam  2 mg Intravenous Once     multivitamin w/minerals  1 tablet Oral Daily     pantoprazole  40 mg Oral QAM AC    Or     pantoprazole  40 mg Per Feeding Tube QAM AC    Or     pantoprazole (PROTONIX) IV  40 mg Intravenous QAM AC     thiamine  200 mg Intravenous TID     thiamine  100 mg Oral TID     [START ON 8/22/2021] thiamine  100 mg Oral Daily       Data   Results for orders placed or performed during the hospital encounter of 08/15/21 (from the past 24 hour(s))   Cardiology IP Consult: Patient to be seen: Routine - within 24 hours; severe aortic stenosis. advise whether further tests are indicated before consulting cardiothoracic surgeons; Consultant may enter orders: Yes; Requesting provider? Attending ph...    Narrative    Jen Amor MD     8/17/2021 11:11 AM  [facility]    Cardiology Consultation     Date of Admission:  8/15/2021    Assessment & Plan      This is a 55 year old female with HTN, bicuspid aortic valve   stenosis, anxiety who presents with shortness of breath and   palpitations. In the ER she was given 1 liter NS and became   hypoxic to the 80s with HR in the 140s, ended up urgently   intubated and was then given IV lasix 60 mg when CXR showed   pulmonary edema. BNP was elevated to 3200 and D-Dimer 0.94, TN   0.127. Her Na was 131. CTPE was negative for PE. COVID test   initially negative. Admitted to ICU and cardiology consulted.   Echocardiogram demonstrated severely reduced LVEF 25-30%, aortic   stenosis with mean gradient 54 mmHg, PAMELLA 0.6 cm2 in setting of   low flow, findings consistent with critical aortic stenosis.    Spent lengthy discussion with  and ICU intensivist. She is   in critical condition with her aortic stenosis and am unsure she   can be medically optimized and discharged to an elective aortic   valve repair situation. It seems more feasible that she undergoes   AVR during this hospitalization. We will attempt gentle diuresis,   obtain LHC and RHC, and plan for CVTS consult for valve   replacement planning. Given her age SAVR is likely to be of most   benefit for her.     Please avoid nitrates. Gentle diuresis, no standing diuretic   orders as this is flash pulmonary edema and does not seem like   long standing fluid overload (chronic). Patients can downward   spiral fairly quickly with minor hemodynamic shifts with her   severity of aortic stenosis thus caution with abrupt changes in   HR, blood pressure.     Will continue to follow.     Jen Amor MD    Code Status    Full Code    Reason for Consult     CHF, aortic stenosis     Primary Care Physician   *Alaina Griffiths    History of Present Illness     This is a 64 year old female with HTN, bicuspid aortic valve   stenosis, anxiety who presents with shortness of breath and   palpitations. She was anxious the day before because she was   having increased  symptoms and tried to self medicate with alcohol   per her . She was having a productive cough prior to this   for several days as well, no LE edema. She had no fevers, chills   but was out drinking with friends on a boat days prior   (unvaccinated). In the ER she was given 1 liter NS and became   hypoxic to the 80s with HR in the 140s, ended up urgently   intubated and was then given IV lasix 60 mg when CXR showed   pulmonary edema. BNP was elevated to 3200 and D-Dimer 0.94, TN   0.127. Her Na was 131. CTPE was negative for PE. COVID test   initially negative. Admitted to ICU and cardiology consulted.   Echocardiogram demonstrated severely reduced LVEF 25-30%, aortic   stenosis with mean gradient 54 mmHg, PAMELLA 0.6 cm2 in setting of   low flow, findings consistent with critical aortic stenosis.    Past Medical History   I have reviewed this patient's medical history and updated it   with pertinent information if needed.   Past Medical History:   Diagnosis Date     Migraines        Past Surgical History   I have reviewed this patient's surgical history and updated it   with pertinent information if needed.  No past surgical history on file.    Prior to Admission Medications   Prior to Admission Medications   Prescriptions Last Dose Informant Patient Reported? Taking?   DULoxetine (CYMBALTA) 20 MG capsule  at has not started yet  Yes   Yes   Sig: Take 20 mg by mouth daily    LORazepam (ATIVAN) 0.5 MG tablet  at PRN  Yes Yes   Sig: Take 0.5 mg by mouth every 6 hours as needed for anxiety (or   flying)    losartan (COZAAR) 50 MG tablet  at has not started yet  Yes Yes   Sig: Take 50 mg by mouth daily      Facility-Administered Medications: None     Allergies   Allergies   Allergen Reactions     Metoclopramide Other (See Comments)       Social History   I have reviewed this patient's social history and updated it with   pertinent information if needed. Mayda ASHWINI Lezama  reports that   she has been smoking. She has  been smoking about 0.50 packs per   day. She does not have any smokeless tobacco history on file. She   reports current alcohol use. She reports that she does not use   drugs.    Family History   I have reviewed this patient's family history and updated it with   pertinent information if needed.   No family history on file.    Review of Systems   The 10 point Review of Systems is negative other than noted in   the HPI or here.     Physical Exam   Temp: 96.8  F (36  C) Temp src: Axillary BP: 93/56 Pulse: 64     Resp: 12 SpO2: 96 % O2 Device: Nasal cannula Oxygen Delivery: 4   LPM  Vital Signs with Ranges  Temp:  [96.8  F (36  C)-98.8  F (37.1  C)] 96.8  F (36  C)  Pulse:  [] 64  Resp:  [0-38] 12  BP: ()/() 93/56  FiO2 (%):  [40 %] 40 %  SpO2:  [77 %-100 %] 96 %  130 lbs 11.72 oz    Intubated,sedated  Respiratory: Bibasilar crackles.   Cardiovascular: Regular rate and rhythm, systolic ejection   murmur, no S2  GI: Soft, non-distended, non-tender, normal bowel sounds.  Skin: No rashes, no cyanosis, no edema.      Data   Results for orders placed or performed during the hospital   encounter of 08/15/21 (from the past 24 hour(s))   [place] Draw    Narrative    The following orders were created for panel order [place] Draw.  Procedure                               Abnormality           Status                     ---------                               -----------           ------                     Extra Blue Top Tube[[id number]]                              Final   result               Extra Red Top Tube[[id number]]                               Final   result               Extra Green Top (Lithium...[[id number]]                      Final   result               Extra Purple Top Tube[[id number]]                            Final   result               Extra Blood Bank Purple ...[[id number]]                                                     Please view results for these tests on the individual orders.    Extra Blue Top Tube   Result Value Ref Range    Hold Specimen JIC    Extra Red Top Tube   Result Value Ref Range    Hold Specimen JIC    Extra Green Top (Lithium Heparin) Tube   Result Value Ref Range    Hold Specimen JIC    Extra Purple Top Tube   Result Value Ref Range    Hold Specimen JIC    CBC with platelets differential    Narrative    The following orders were created for panel order CBC with   platelets differential.  Procedure                               Abnormality           Status                     ---------                               -----------           ------                     CBC with platelets and d...[[id number]]  Abnormal            Final   result                 Please view results for these tests on the individual orders.   D dimer quantitative   Result Value Ref Range    D-Dimer Quantitative 0.94 (H) 0.00 - 0.50 ug/mL FEU    Narrative    This D-dimer assay is intended for use in conjunction with a   clinical pretest probability assessment model to exclude   pulmonary embolism (PE) and deep venous thrombosis (DVT) in   outpatients suspected of PE or DVT. The cut-off value is 0.50   ug/mL FEU.   Comprehensive metabolic panel   Result Value Ref Range    Sodium 131 (L) 133 - 144 mmol/L    Potassium 3.9 3.4 - 5.3 mmol/L    Chloride 103 94 - 109 mmol/L    Carbon Dioxide (CO2) 20 20 - 32 mmol/L    Anion Gap 8 3 - 14 mmol/L    Urea Nitrogen 10 7 - 30 mg/dL    Creatinine 0.72 0.52 - 1.04 mg/dL    Calcium 8.8 8.5 - 10.1 mg/dL    Glucose 92 70 - 99 mg/dL    Alkaline Phosphatase 97 40 - 150 U/L    AST 39 0 - 45 U/L    ALT 39 0 - 50 U/L    Protein Total 7.5 6.8 - 8.8 g/dL    Albumin 4.2 3.4 - 5.0 g/dL    Bilirubin Total 0.7 0.2 - 1.3 mg/dL    GFR Estimate >90 >60 mL/min/1.73m2   Lipase   Result Value Ref Range    Lipase 133 73 - 393 U/L   Troponin I   Result Value Ref Range    Troponin I 0.127 (HH) 0.000 - 0.045 ug/L   TSH with free T4 reflex   Result Value Ref Range    TSH 0.66 0.40 - 4.00 mU/L    CBC with platelets and differential   Result Value Ref Range    WBC Count 10.6 4.0 - 11.0 10e3/uL    RBC Count 4.62 3.80 - 5.20 10e6/uL    Hemoglobin 15.1 11.7 - 15.7 g/dL    Hematocrit 44.0 35.0 - 47.0 %    MCV 95 78 - 100 fL    MCH 32.7 26.5 - 33.0 pg    MCHC 34.3 31.5 - 36.5 g/dL    RDW 12.7 10.0 - 15.0 %    Platelet Count 302 150 - 450 10e3/uL    % Neutrophils 75 %    % Lymphocytes 18 %    % Monocytes 5 %    % Eosinophils 0 %    % Basophils 1 %    % Immature Granulocytes 1 %    NRBCs per 100 WBC 0 <1 /100    Absolute Neutrophils 8.0 1.6 - 8.3 10e3/uL    Absolute Lymphocytes 1.9 0.8 - 5.3 10e3/uL    Absolute Monocytes 0.5 0.0 - 1.3 10e3/uL    Absolute Eosinophils 0.0 0.0 - 0.7 10e3/uL    Absolute Basophils 0.1 0.0 - 0.2 10e3/uL    Absolute Immature Granulocytes 0.1 (H) <=0.0 10e3/uL    Absolute NRBCs 0.0 10e3/uL   Ethyl Alcohol Level   Result Value Ref Range    Alcohol ethyl 0.22 (H) <=0.01 g/dL   Magnesium   Result Value Ref Range    Magnesium 2.0 1.6 - 2.3 mg/dL   Nt probnp inpatient (BNP)   Result Value Ref Range    N terminal Pro BNP Inpatient 3,205 (H) 0 - 900 pg/mL   EKG 12-lead, tracing only   Result Value Ref Range    Systolic Blood Pressure  mmHg    Diastolic Blood Pressure  mmHg    Ventricular Rate 140 BPM    Atrial Rate 140 BPM    NV Interval 116 ms    QRS Duration 90 ms     ms    QTc 473 ms    P Axis  degrees    R AXIS 154 degrees    T Axis 134 degrees    Interpretation ECG       Sinus tachycardia  Left posterior fascicular block  Left ventricular hypertrophy with repolarization abnormality  Abnormal ECG  No previous ECGs available  Confirmed by GENERATED REPORT, COMPUTER ([id number]),  [place] ([id number]) on 8/15/2021 3:42:58 PM     XR Chest Port 1 View    Narrative    XR PORTABLE CHEST ONE VIEW   8/15/2021 3:17 PM     HISTORY: Shortness of breath    COMPARISON: None available      Impression    IMPRESSION: Portal AP view of the chest was obtained.  Cardiomediastinal silhouette is within  normal limits. Bibasilar  pulmonary opacities, could represent atelectasis versus   infection. No  significant pleural effusion or pneumothorax.    YVONNE JACOBO MD         SYSTEM ID:  [id number]   -Intubation    Narrative    Crescencio Cordoba MD     8/15/2021  6:27 PM  [facility]    -Intubation    Date/Time: 8/15/2021 3:17 PM  Performed by: Crescencio Cordoba MD  Authorized by: Crescencio Cordoba MD       PRE-PROCEDURE DETAILS     Pretreatment medications:  None    Paralytics:  Succinylcholine      PROCEDURE DETAILS     Preoxygenation:  BiPAP    CPR in progress: no      Intubation method:  Oral    Oral intubation technique:  Direct    Laryngoscope blade:  Mac 4    Tube size (mm):  7.5    Tube type:  Cuffed    Number of attempts:  1    Cricoid pressure: no      Tube visualized through cords: yes      PLACEMENT ASSESSMENT     ETT to lip:  23    Placement verification: CXR verification, direct visualization,   equal   breath sounds, ETCO2 detector and tube exhalation      CXR findings:  ETT in proper place  PROCEDURE   Patient Tolerance:  Patient tolerated the procedure well with no   immediate   complications     Symptomatic COVID-19 Virus (Coronavirus) by PCR Nasopharyngeal    Specimen: Nasopharyngeal; Swab   Result Value Ref Range    SARS CoV2 PCR Negative Negative    Narrative    Testing was performed using the rocio  SARS-CoV-2 & Influenza   A/B Assay on the rocio  [place]  System.  This test should be   ordered for the detection of SARS-COV-2 in individuals who meet   SARS-CoV-2 clinical and/or epidemiological criteria. Test   performance is unknown in asymptomatic patients.  This test is   for in vitro diagnostic use under the FDA EUA for laboratories   certified under CLIA to perform moderate and/or high complexity   testing. This test has not been FDA cleared or approved.  A   negative test does not rule out the presence of PCR inhibitors in   the specimen or target RNA in  concentration below the limit of   detection for the assay. The possibility of a false negative   should be considered if the patient's recent exposure or clinical   presentation suggests COVID-19.  [facility]   are certified under the Clinical Laboratory Improvement   Amendments of 1988 (CLIA-88) as qualified to perform moderate   and/or high complexity laboratory testing.   Blood Culture Peripheral Blood    Specimen: Peripheral Blood   Result Value Ref Range    Culture No growth after 12 hours    XR Chest Port 1 View    Narrative    XR PORTABLE CHEST ONE VIEW   8/15/2021 3:32 PM     HISTORY: Post intubation    COMPARISON: Chest x-ray 8/15/2021.      Impression    Impression: Portable chest. Patchy airspace opacities in the mid   to  lower left lung are now superimposed on the background of   interstitial  changes seen on prior exam. Findings are concerning for   pneumonia.  Endotracheal tube terminates approximately 3 cm above the sophia   in  good position. Nasogastric tube extends below the left   hemidiaphragm  overlying the region of the stomach. No pneumothorax or pleural  effusions.    HAILEE CRUZ MD         SYSTEM ID:  [id number]   CT Chest Pulmonary Embolism w Contrast    Narrative    CT CHEST PULMONARY EMBOLISM WITH CONTRAST  8/15/2021 3:59 PM    HISTORY:  Palpitation and shortness of breath. Pulmonary embolism  suspected, high probability.    TECHNIQUE: Scans obtained from the apices through the diaphragm   with  IV contrast. 56mL [id number]     IV injected. Radiation dose for   this  scan was reduced using automated exposure control, adjustment of   the  mA and/or kV according to patient size, or iterative   reconstruction  technique.    COMPARISON:  Chest x-ray on same day earlier    FINDINGS:  Chest/mediastinum: Endotracheal tube tip is in the low thoracic  trachea. Enteric tube crosses the diaphragm with the distal tip   in the  stomach. No evidence of pulmonary embolism.  Cardiomegaly. No  significant pericardial effusion. Moderate atherosclerotic   vascular  calcification of the coronary arteries and thoracic aorta.   Multiple  prominent mediastinal and hilar lymph nodes, indeterminate, could   be  reactive.    Lungs and pleura: Small bilateral pleural effusions and   associated  basilar atelectasis/consolidation. Scattered patchy and nodular  pulmonary opacities, worrisome for infection. Diffuse   interlobular  septal thickening, could be due to underlying pulmonary edema.    Upper abdomen: Limited evaluation of the upper abdomen due to   lack of  coverage and timing of contrast. Reflux of contrast into the IVC   and  hepatic veins, suggesting right heart dysfunction.    Bones and soft tissue: No suspicious osseous lesion.      Impression    IMPRESSION:   1. No evidence pulmonary embolism.  2. Small bilateral pleural effusions and associated basilar  atelectasis/consolidation.  3. Multiple small patchy and nodular pulmonary opacities,   worrisome  for infection. Recommend follow-up exam after symptoms  improvement/resolution.  4. Cardiomegaly and scattered areas of interlobular septal   thickening,  could be due to underlying pulmonary edema. Also, reflux of   contrast  into the IVC and hepatic veins, suggesting right heart   dysfunction.    YVONNE JACOBO MD         SYSTEM ID:  [id number]   Lactic acid whole blood   Result Value Ref Range    Lactic Acid 3.1 (H) 0.7 - 2.0 mmol/L   Blood Culture Peripheral Blood    Specimen: Peripheral Blood   Result Value Ref Range    Culture No growth after 12 hours    Glucose by meter   Result Value Ref Range    GLUCOSE BY METER POCT 98 70 - 99 mg/dL   Blood gas arterial and oxyhgb   Result Value Ref Range    pH Arterial 7.30 (L) 7.35 - 7.45    pCO2 Arterial 43 35 - 45 mm Hg    pO2 Arterial 87 80 - 105 mm Hg    Bicarbonate Arterial 21 21 - 28 mmol/L    Oxyhemoglobin Arterial 94 92 - 100 %    Base Excess/Deficit (+/-) -5.0 -9.0 - 1.8 mmol/L     FIO2 40    Glucose by meter   Result Value Ref Range    GLUCOSE BY METER POCT 85 70 - 99 mg/dL   Magnesium   Result Value Ref Range    Magnesium 1.7 1.6 - 2.3 mg/dL   Phosphorus   Result Value Ref Range    Phosphorus 5.0 (H) 2.5 - 4.5 mg/dL   Glucose by meter   Result Value Ref Range    GLUCOSE BY METER POCT 56 (L) 70 - 99 mg/dL   Glucose by meter   Result Value Ref Range    GLUCOSE BY METER POCT 178 (H) 70 - 99 mg/dL   Glucose by meter   Result Value Ref Range    GLUCOSE BY METER POCT 123 (H) 70 - 99 mg/dL   CBC with platelets   Result Value Ref Range    WBC Count 6.3 4.0 - 11.0 10e3/uL    RBC Count 4.05 3.80 - 5.20 10e6/uL    Hemoglobin 13.0 11.7 - 15.7 g/dL    Hematocrit 38.1 35.0 - 47.0 %    MCV 94 78 - 100 fL    MCH 32.1 26.5 - 33.0 pg    MCHC 34.1 31.5 - 36.5 g/dL    RDW 12.4 10.0 - 15.0 %    Platelet Count 203 150 - 450 10e3/uL   Basic metabolic panel   Result Value Ref Range    Sodium 137 133 - 144 mmol/L    Potassium 3.0 (L) 3.4 - 5.3 mmol/L    Chloride 107 94 - 109 mmol/L    Carbon Dioxide (CO2) 24 20 - 32 mmol/L    Anion Gap 6 3 - 14 mmol/L    Urea Nitrogen 12 7 - 30 mg/dL    Creatinine 0.81 0.52 - 1.04 mg/dL    Calcium 7.7 (L) 8.5 - 10.1 mg/dL    Glucose 84 70 - 99 mg/dL    GFR Estimate 81 >60 mL/min/1.73m2   Lactic acid whole blood   Result Value Ref Range    Lactic Acid 0.5 (L) 0.7 - 2.0 mmol/L   Glucose by meter   Result Value Ref Range    GLUCOSE BY METER POCT 83 70 - 99 mg/dL   XR Chest Port 1 View    Narrative    XR CHEST PORT 1 VIEW   8/16/2021 7:46 AM     HISTORY: pulm edema    COMPARISON: Chest CT 8/15/2021      Impression    IMPRESSION: Endotracheal tube tip 3.4 cm above the sophia.   Enteric  tube tip below the diaphragm. Increased retrocardiac left lower   lung  consolidation which may represent compressive atelectasis or  pneumonia. Unchanged small left and trace right pleural   effusions.  Decreased pulmonary venous congestion. Normal heart size. No  pneumothorax.    NEELAM TUBBS MD          SYSTEM ID:  [id number]   Echocardiogram Complete   Result Value Ref Range    LVEF  25-30%     [facility]    [id number]  [id number]  [id number]  [id number]^DIONI^KARL^ANNAMARIA     [facility]  Echocardiography Laboratory  [address] [place] [address], [place] [address]     Name: MIKHAIL ESPINAL  MRN: [id number]  : 1965  Study Date: 2021 07:56 AM  Age: 55 yrs  Gender: Female  Patient Location: [facility]  Reason For Study: Respiratory Failure, Aortic Valve Disorder  Ordering Physician: KARL WHEATLEY  Referring Physician: KARL WHEATLEY  Performed By: NEGRO Garcia     BSA: 1.6 m2  Height: 65 in  Weight: 130 lb  HR: 62  BP: 107/71 mmHg  __________________________________________________________________  ____________  Procedure  Complete Portable Echo Adult.  __________________________________________________________________  ____________  Interpretation Summary     1. Left ventricular systolic function is severely reduced. The   visual ejection  fraction is 25-30%.  2. The right ventricle is normal in structure, function and size.  3. The left atrium is mildly dilated.  4. Severe aortic stenosis; mean gradient 54 mmHg, peak velocity   4.4 m/sec,  calculated valve area 0.6 cm2. Patient has known history of   bicuspid aortic  valve.  5. Compared with the prior report from an outside hospital dated   18, the  gradients across the aortic valve have further increased and the   LV function  is now reduced.  __________________________________________________________________  ____________  Left Ventricle  The left ventricle is normal in size. There is mild concentric   left  ventricular hypertrophy. Left ventricular systolic function is   severely  reduced. The visual ejection fraction is 25-30%. There is severe   global  hypokinesia of the left ventricle.     Right Ventricle  The right ventricle is normal in structure, function and size.     Atria  The left atrium is mildly  dilated. Right atrial size is normal.     Mitral Valve  The mitral valve is normal in structure and function. There is   mild (1+)  mitral regurgitation.     Tricuspid Valve  The tricuspid valve is normal in structure and function. There is   trace  tricuspid regurgitation.     Aortic Valve  There is mild (1+) aortic regurgitation. Severe valvular aortic   stenosis.  Severe aortic stenosis; mean gradient 54 mmHg, peak velocity 4.4   m/sec,  calculated valve area 0.6 cm2. Patient has known history of   bicuspid aortic  valve.     Pulmonic Valve  The pulmonic valve is normal in structure and function.     Vessels  Normal size ascending aorta. Dilated IVC; patient intubated.     Pericardium  There is no pericardial effusion.     Rhythm  Sinus rhythm was noted.  __________________________________________________________________  ____________  MMode/2D Measurements & Calculations  IVSd: 1.2 cm     LVIDd: 4.9 cm  LVIDs: 4.7 cm  LVPWd: 1.1 cm  FS: 5.2 %  LV mass(C)d: 223.1 grams  LV mass(C)dI: 135.4 grams/m2  Ao root diam: 2.6 cm  asc Aorta Diam: 3.3 cm  LVOT diam: 2.2 cm  LVOT area: 3.9 cm2  LA Volume (BP): 54.7 ml  LA Volume Index (BP): 33.2 ml/m2  RWT: 0.45     Doppler Measurements & Calculations  MV E max abbe: 92.5 cm/sec  MV A max abbe: 92.5 cm/sec  MV E/A: 1.0  MV max PG: 3.2 mmHg  MV mean P.7 mmHg  MV V2 VTI: 35.9 cm  MVA(VTI): 1.9 cm2  MV dec time: 0.21 sec  Ao V2 max: 436.0 cm/sec  Ao max P.1 mmHg  Ao V2 mean: 360.4 cm/sec  Ao mean P.2 mmHg  Ao V2 VTI: 123.3 cm  PAMELLA(I,D): 0.56 cm2  PAMELLA(V,D): 0.56 cm2  AI P1/2t: 482.4 msec  LV V1 max P.6 mmHg  LV V1 max: 62.4 cm/sec  LV V1 VTI: 17.6 cm  SV(LVOT): 68.6 ml  SI(LVOT): 41.7 ml/m2  PA acc time: 0.13 sec  AV Abbe Ratio (DI): 0.14  PAMELLA Index (cm2/m2): 0.34  E/E' av.4  Lateral E/e': 22.8  Medial E/e': 29.9     __________________________________________________________________  ____________  Report approved by: Bob Castaneda 2021 09:03  AM         Glucose by meter   Result Value Ref Range    GLUCOSE BY METER POCT 91 70 - 99 mg/dL   Potassium   Result Value Ref Range    Potassium 3.7 3.4 - 5.3 mmol/L   Magnesium   Result Value Ref Range    Magnesium 1.8 1.6 - 2.3 mg/dL   Phosphorus   Result Value Ref Range    Phosphorus 3.7 2.5 - 4.5 mg/dL   ABO/Rh type and screen    Narrative    The following orders were created for panel order ABO/Rh type   and screen.  Procedure                               Abnormality           Status                     ---------                               -----------           ------                     Adult Type and Screen[[id number]]                                                           Please view results for these tests on the individual orders.                Cardiothoracic Surgery IP Consult: Patient to be seen: Routine - within 24 hours; Bicuspid aortic stenosis, critical, heart failure, NSTEMI; Consultant may enter orders: Yes; Requesting provider? Attending physician; Name: Temo Rivera MD     8/16/2021  2:25 PM  Cardiothoracic Surgery Consult Note    Mayda Lezama MRN# [id number]   Age: 55 year old YOB: 1965     Date of Admission:  8/15/2021    Date of Consult:   8/15/21    Reason for consult: Severe aortic stenosis with bicuspid aortic   valve           Assessment and Plan:   Assessment:   Bicuspid aortic valve with severe aortic stenosis and CHF, was   intubated on admission, extubated today.  Meets criteria for aortic valve replacement.  Coronary patency and pulmonary pressures not evaluated yet.      Plan:   - Patient needs right and left heart cath for evaluation of PA   pressures and Coronary vessels respectively.  - Will schedule the patient for AVR and other necessary   procedures this week, will recommend mechanical valve in view of   young age.  - Please continue current care, CT Surgery team will follow.  - Patient seen with Dr. Montaño and above plan  discussed with her   and her , who were in agreement with the above plan.            Chief Complaint:   SOB and cough         History of Present Illness:   Italia Lezama is a 55-year-old female who presented to the ED   for shortness of breath and palpitations.  Per ED report, the   patient was drinking throughout the day 8/14 and started to have   palpitations in the evening.  She continued to have palpitations   into the following morning (8/15), and with developing dyspnea   and anxiety, presented to the ED.  She has also had a productive   cough over the last couple of days and is not COVID vaccinated.    In the ED, the patient received 1 L NS and rapidly developed   worsening dyspnea and hypoxia to the 80s, and HR to 140s.  She   was emergently intubated for ARF and given total of 60 mg   furosemide.  Chest x-ray showed patchy interstitial opacities.    Labs were remarkable for BNP elevated to 3,200, d-dimer of 0.94,   troponin to 0.127, grossly normal CBC and BMP aside from Na+ of   131, and EtOH of 0.22.  CTPE and COVID negative. CT PE was   negative and ECHO was done which showed severe aortic stenosis.   She was extubated today. Cardiac Surgery was consulted for   surgical treatment of catrina bicuspid aortic valve and aortic   stenosis.          Past Medical History:   HTN  Bicuspid aortic valve  Aortic stenosis  Anxiety  Migraines        Past Surgical History:   None       Social History:   Chronic smoker for 40 years, 1/2-1 PPD    Social History     Tobacco Use     Smoking status: Current Every Day Smoker     Packs/day: 0.50   Substance Use Topics     Alcohol use: Yes             Family History:   No family history on file.             Allergies:     Allergies   Allergen Reactions     Metoclopramide Other (See Comments)             Medications:     Current Facility-Administered Medications   Medication     acetaminophen (TYLENOL) tablet 650 mg    Or     acetaminophen (TYLENOL) solution 650 mg      albuterol (PROAIR HFA/PROVENTIL HFA/VENTOLIN HFA) 108 (90 Base)   MCG/ACT inhaler 6 puff     albuterol (PROAIR HFA/PROVENTIL HFA/VENTOLIN HFA) 108 (90 Base)   MCG/ACT inhaler 6 puff     bisacodyl (DULCOLAX) Suppository 10 mg     chlorhexidine (PERIDEX) 0.12 % solution 15 mL     dexmedetomidine (PRECEDEX) 400 mcg in 0.9% sodium chloride 100   mL     glucose gel 15-30 g    Or     dextrose 50 % injection 25-50 mL    Or     glucagon injection 1 mg     enoxaparin ANTICOAGULANT (LOVENOX) injection 40 mg     flumazenil (ROMAZICON) injection 0.2 mg     [START ON 8/18/2021] folic acid (FOLVITE) tablet 1 mg     folic acid injection 1 mg     furosemide (LASIX) injection 20 mg     [START ON 8/23/2021] gabapentin (NEURONTIN) solution 100 mg     [START ON 8/21/2021] gabapentin (NEURONTIN) solution 300 mg     [START ON 8/19/2021] gabapentin (NEURONTIN) solution 600 mg     gabapentin (NEURONTIN) solution 900 mg     haloperidol lactate (HALDOL) injection 5 mg     ipratropium - albuterol 0.5 mg/2.5 mg/3 mL (DUONEB) neb   solution 3 mL     LORazepam (ATIVAN) injection 2 mg     magnesium hydroxide (MILK OF MAGNESIA) suspension 30 mL     metoprolol (LOPRESSOR) injection 5 mg     multivitamin w/minerals (THERA-VIT-M) tablet 1 tablet     ondansetron (ZOFRAN-ODT) ODT tab 4 mg    Or     ondansetron (ZOFRAN) injection 4 mg     pantoprazole (PROTONIX) EC tablet 40 mg    Or     pantoprazole (PROTONIX) 2 mg/mL suspension 40 mg    Or     pantoprazole (PROTONIX) IV push injection 40 mg     phenylephrine (MICHELLE-SYNEPHRINE) 50 mg in NaCl 0.9 % 250 mL   infusion PERIPHERAL     prochlorperazine (COMPAZINE) injection 10 mg    Or     prochlorperazine (COMPAZINE) tablet 10 mg    Or     prochlorperazine (COMPAZINE) suppository 25 mg     propofol (DIPRIVAN) infusion     senna-docusate (SENOKOT-S/PERICOLACE) 8.6-50 MG per tablet 1   tablet    Or     senna-docusate (SENOKOT-S/PERICOLACE) 8.6-50 MG per tablet 2   tablet     thiamine (B-1) 200 mg in sodium  chloride 0.9 % 50 mL   intermittent infusion     [START ON 8/17/2021] thiamine (B-1) tablet 100 mg     [START ON 8/22/2021] thiamine (B-1) tablet 100 mg               Review of Systems:    ROS: 10 point ROS neg other than the symptoms noted above in the   HPI.          Physical Exam:   All vitals have been reviewed  Temp:  [96.8  F (36  C)-98.8  F (37.1  C)] 96.8  F (36  C)  Pulse:  [] 64  Resp:  [0-38] 12  BP: ()/() 93/56  FiO2 (%):  [40 %] 40 %  SpO2:  [77 %-100 %] 96 %    Intake/Output Summary (Last 24 hours) at 8/16/2021 1401  Last data filed at 8/16/2021 1200  Gross per 24 hour   Intake 836.21 ml   Output 4245 ml   Net -3408.79 ml     Physical Exam:  GENERAL: appears well, in no acute distress  HEENT:  within normal limits.  NECK:  supple, no lymphadenopathy  CARDIOVASCULAR:  Regular rate and rhythm, normal S1, S2. Systolic   murmur +  LUNGS:  bilateral crackles  ABDOMEN:  Soft, nontender, nondistended.  EXTREMITIES:  Negative for edema, cyanosis or clubbing.  NEUROLOGIC:  Alert and oriented x 3 with no focal deficits.          Data:   All laboratory data reviewed    Results:  BMP  Recent Labs   Lab 08/16/21  1017 08/16/21  0940 08/16/21  0555 08/16/21  0454 08/16/21  0241 08/15/21  1427   NA  --   --   --  137  --  131*   POTASSIUM 3.7  --   --  3.0*  --  3.9   CHLORIDE  --   --   --  107  --  103   CO2  --   --   --  24  --  20   BUN  --   --   --  12  --  10   CR  --   --   --  0.81  --  0.72   GLC  --  91 83 84 123* 92     CBC  Recent Labs   Lab 08/16/21  0454 08/15/21  1427   WBC 6.3 10.6   HGB 13.0 15.1    302     LFT  Recent Labs   Lab 08/15/21  1427   AST 39   ALT 39   ALKPHOS 97   BILITOTAL 0.7   ALBUMIN 4.2     Recent Labs   Lab 08/16/21  0940 08/16/21  0555 08/16/21  0454 08/16/21  0241 08/16/21  0145 08/16/21  0127   GLC 91 83 84 123* 178* 56*       ECHO: 8/15  1. Left ventricular systolic function is severely reduced. The   visual ejection  fraction is 25-30%.  2. The right  ventricle is normal in structure, function and size.  3. The left atrium is mildly dilated.  4. Severe aortic stenosis; mean gradient 54 mmHg, peak velocity   4.4 m/sec,  calculated valve area 0.6 cm2. Patient has known history of   bicuspid aortic  valve.  5. Compared with the prior report from an outside hospital dated   2/21/18, the  gradients across the aortic valve have further increased and the   LV function  is now reduced.         KENDELL KNOWLES MD  Fellow, Cardiothoracic Surgery             Troponin I   Result Value Ref Range    Troponin I 0.272 (HH) 0.000 - 0.045 ug/L   Hemoglobin A1c   Result Value Ref Range    Hemoglobin A1C 5.0 0.0 - 5.6 %   ABO/Rh type and screen    Narrative    The following orders were created for panel order ABO/Rh type and screen.  Procedure                               Abnormality         Status                     ---------                               -----------         ------                     Adult Type and Screen[[id number]]                            Final result                 Please view results for these tests on the individual orders.   Adult Type and Screen   Result Value Ref Range    ABO/RH(D) O POS     Antibody Screen Negative Negative    SPECIMEN EXPIRATION DATE [id number]    UA with Microscopic reflex to Culture    Specimen: Urine, Lassiter Catheter   Result Value Ref Range    Color Urine Light Yellow Colorless, Straw, Light Yellow, Yellow    Appearance Urine Clear Clear    Glucose Urine Negative Negative mg/dL    Bilirubin Urine Negative Negative    Ketones Urine Negative Negative mg/dL    Specific Gravity Urine 1.013 1.003 - 1.035    Blood Urine Negative Negative    pH Urine 5.0 5.0 - 7.0    Protein Albumin Urine Negative Negative mg/dL    Urobilinogen Urine Normal Normal, 2.0 mg/dL    Nitrite Urine Negative Negative    Leukocyte Esterase Urine Moderate (A) Negative    Bacteria Urine Few (A) None Seen /HPF    Mucus Urine Present (A) None Seen /LPF    RBC  Urine 0 <=2 /HPF    WBC Urine 19 (H) <=5 /HPF    Squamous Epithelials Urine <1 <=1 /HPF    Hyaline Casts Urine 9 (H) <=2 /LPF    Narrative    Urine Culture ordered based on laboratory criteria   CT Dental wo Contrast    Narrative    CT SCAN OF THE FACE WITHOUT CONTRAST 8/16/2021 4:47 PM     HISTORY: Preoperative AVR clearance    TECHNIQUE: Axial CT images of the facial bones were completed with  sagittal and coronal reformations. A Panorex curved planar reformatted  sequence was created. Radiation dose for this scan was reduced using  automated exposure control, adjustment of the mA and/or kV according  to patient size, or iterative reconstruction technique.     COMPARISON: None.    FINDINGS: No significant carious lesions are identified. There is  dental amalgam of multiple maxillary teeth which causes streak  artifact. No suspicious periapical lucency to suggest apical  periodontitis.     Moderate mucosal thickening in the inferior maxillary sinuses.    Visualized intracranial structures are unremarkable. No suspicious  masses within the visualized neck.      Impression    IMPRESSION: No suspicious carious lesions or periapical lucency around  any of the teeth.    MALIKA CASTILLO MD         SYSTEM ID:  [id number]   Extra Tube    Narrative    The following orders were created for panel order Extra Tube.  Procedure                               Abnormality         Status                     ---------                               -----------         ------                     Extra Purple Top Tube[[id number]]                            Final result                 Please view results for these tests on the individual orders.   Extra Purple Top Tube   Result Value Ref Range    Hold Specimen JIC    Potassium   Result Value Ref Range    Potassium 3.6 3.4 - 5.3 mmol/L   Magnesium   Result Value Ref Range    Magnesium 2.0 1.6 - 2.3 mg/dL   Basic metabolic panel   Result Value Ref Range    Sodium 138 133 - 144 mmol/L     Potassium 3.5 3.4 - 5.3 mmol/L    Chloride 105 94 - 109 mmol/L    Carbon Dioxide (CO2) 26 20 - 32 mmol/L    Anion Gap 7 3 - 14 mmol/L    Urea Nitrogen 15 7 - 30 mg/dL    Creatinine 0.86 0.52 - 1.04 mg/dL    Calcium 8.6 8.5 - 10.1 mg/dL    Glucose 84 70 - 99 mg/dL    GFR Estimate 76 >60 mL/min/1.73m2   Glucose by meter   Result Value Ref Range    GLUCOSE BY METER POCT 155 (H) 70 - 99 mg/dL   Cardiac Catheterization    Narrative      Right sided filling pressures are moderately elevated.    Moderately elevated pulmonary artery hypertension.    Left sided filling pressures are moderately elevated.    Normal cardiac output level.    Ost LAD to Prox LAD lesion is 20% stenosed.    Prox RCA lesion is 25% stenosed.

## 2021-08-17 NOTE — PLAN OF CARE
A&O x4, denies pain. Scoring 0 on CIWA scale. VSS on 2L NC. BP's soft at times. C/o anxiety, improved with prn atarax. +murmur, weak pulses, no edema. LS clear with fine crackles at bases denies SOB but is dyspneic with activity. Pt instructed on use of IS & Acapella, needs positive reinforcement/encouragement. Lassiter patent, at times low UOP (MD aware). No BM overnight. Plan for SAVR in the near future, still needs: LHC/RHC.

## 2021-08-17 NOTE — PROGRESS NOTES
Comprehensive Daily ICU Note        Mayda Lezama MRN# [id number]   Age: 55 year old YOB: 1965     Date of Admission: 8/15/2021    Primary care provider: Alaina Griffiths     CODE STATUS: FULL      Problem List:     Active Problems:    Acute respiratory failure with hypoxia (H)    Acute congestive heart failure, unspecified heart failure type (H)    Bicuspid aortic valve    Aortic valve stenosis         Treatment goals for next 24 hours:     Right and left heart catheterization for AVR pre-op workup        Subjective/ Last 24 hours:   Italia Lezama is a 55-year-old female with a history of hypertension, aortic stenosis, and anxiety who presented to the ED for shortness of breath and palpitations.  Per ED report, the patient was drinking throughout the day 8/14 and started to have palpitations in the evening.  She continued to have palpitations into the following morning (8/15), and with developing dyspnea and anxiety, presented to the ED.  She has also had a productive cough over the last couple of days and is not COVID vaccinated.  In the ED, the patient received 1 L NS and rapidly developed worsening dyspnea and hypoxia to the 80s, and HR to 140s.  She was emergently intubated for ARF and given total of 60 mg furosemide.  Chest x-ray showed patchy interstitial opacities.  Labs were remarkable for BNP elevated to 3,200, d-dimer of 0.94, troponin to 0.127, grossly normal CBC and BMP aside from Na+ of 131, and EtOH of 0.22.  CTPE and COVID negative.  Per  as far as he knows she is not a daily drinker and has no history of alcohol withdrawal.  Says that prior to her hospitalization in the few days before she came in, was having no difficulties with breathing. The patient was extubated on 8/16 and has been saturating well on 2 L NC.  No signs of alcohol withdrawal currently.  The patient was seen in consultation by cardiology and CV surgery, who plan to proceed with heart cath today and  subsequent mechanical AVR during this hospital stay.         Mechanical Ventilation/Vitalsigns/IsandOs:     Temp:  [97.6  F (36.4  C)-100.6  F (38.1  C)] 100.6  F (38.1  C)  Pulse:  [] 76  Resp:  [10-23] 22  BP: ()/(53-76) 114/67  FiO2 (%):  [40 %] 40 %  SpO2:  [91 %-98 %] 94 %    Intake/Output Summary (Last 24 hours) at 8/17/2021 0749  Last data filed at 8/17/2021 0600  Gross per 24 hour   Intake 736.63 ml   Output 2795 ml   Net -2058.37 ml     Net IO Since Admission: -4,039.04 mL [08/17/21 0749]         Physical Examination:     General: Stated age, minimally diaphoretic  HEENT: Atraumatic, on nasal cannula  Lungs: Clear anteriorly  CVS: Regular rate and rhythm, systolic murmur  Abdomen: Benign warm, no edema, good pulses  Extremities/musculoskeletal: normal  Neurology: Slightly anxious, conversant. Grossly intact  Skin: normal  Psychiatry: as above  Exam of Line sites: No lines         Feeding/Glucose:     Orders Placed This Encounter      Low Saturated Fat Na <2400 mg    Recent Labs   Lab 08/16/21  0940 08/16/21  0555 08/16/21  0454 08/16/21  0241 08/16/21  0145 08/16/21  0127   GLC 91 83 84 123* 178* 56*            Medications:       enoxaparin ANTICOAGULANT  40 mg Subcutaneous Q24H     [START ON 8/18/2021] folic acid  1 mg Oral Daily     folic acid  1 mg Intravenous Daily     [START ON 8/23/2021] gabapentin  100 mg Oral Q8H     [START ON 8/21/2021] gabapentin  300 mg Oral Q8H     [START ON 8/19/2021] gabapentin  600 mg Oral Q8H     gabapentin  900 mg Oral Q8H     LORazepam  2 mg Intravenous Once     multivitamin w/minerals  1 tablet Oral Daily     pantoprazole  40 mg Oral QAM AC    Or     pantoprazole  40 mg Per Feeding Tube QAM AC    Or     pantoprazole (PROTONIX) IV  40 mg Intravenous QAM AC     thiamine  200 mg Intravenous TID     thiamine  100 mg Oral TID     [START ON 8/22/2021] thiamine  100 mg Oral Daily         dexmedetomidine Stopped (08/16/21 1323)     phenylephrine Stopped (08/16/21  0100)     propofol (DIPRIVAN) infusion Stopped (08/16/21 1242)            Labs:     ROUTINE ICU LABS (Last four results)  CMP  Recent Labs   Lab 08/17/21  0515 08/16/21  1017 08/16/21  0940 08/16/21  0555 08/16/21  0454 08/16/21  0241 08/15/21  2139 08/15/21  1427     --   --   --  137  --   --  131*   POTASSIUM 3.5  3.6 3.7  --   --  3.0*  --   --  3.9   CHLORIDE 105  --   --   --  107  --   --  103   CO2  --   --   --   --  24  --   --  20   ANIONGAP  --   --   --   --  6  --   --  8   GLC  --   --  91 83 84 123*  --  92   BUN  --   --   --   --  12  --   --  10   CR  --   --   --   --  0.81  --   --  0.72   GFRESTIMATED  --   --   --   --  81  --   --  >90   KARLY  --   --   --   --  7.7*  --   --  8.8   MAG 2.0 1.8  --   --   --   --  1.7 2.0   PHOS  --  3.7  --   --   --   --  5.0*  --    PROTTOTAL  --   --   --   --   --   --   --  7.5   ALBUMIN  --   --   --   --   --   --   --  4.2   BILITOTAL  --   --   --   --   --   --   --  0.7   ALKPHOS  --   --   --   --   --   --   --  97   AST  --   --   --   --   --   --   --  39   ALT  --   --   --   --   --   --   --  39     CBC  Recent Labs   Lab 08/16/21  0454 08/15/21  1427   WBC 6.3 10.6   RBC 4.05 4.62   HGB 13.0 15.1   HCT 38.1 44.0   MCV 94 95   MCH 32.1 32.7   MCHC 34.1 34.3   RDW 12.4 12.7    302     INRNo lab results found in last 7 days.  Arterial Blood Gas  Recent Labs   Lab 08/15/21  1834   PH 7.30*   PCO2 43   PO2 87   HCO3 21   O2PER 40     Cultures:  No results for input(s): CULT in the last 168 hours.  Blood culture:  Invalid input(s): BC   Urine culture:  No results for input(s): URC in the last 168 hours.          Imaging/Other results:     Recent Results (from the past 24 hour(s))   XR Chest Port 1 View    Narrative    XR CHEST PORT 1 VIEW   8/16/2021 7:46 AM     HISTORY: pulm edema    COMPARISON: Chest CT 8/15/2021      Impression    IMPRESSION: Endotracheal tube tip 3.4 cm above the sophia. Enteric  tube tip below the diaphragm.  Increased retrocardiac left lower lung  consolidation which may represent compressive atelectasis or  pneumonia. Unchanged small left and trace right pleural effusions.  Decreased pulmonary venous congestion. Normal heart size. No  pneumothorax.    NEELAM TUBBS MD         SYSTEM ID:  [id number]   Echocardiogram Complete   Result Value    LVEF  25-30%    [facility]    [id number]  [id number]  [id number]  [id number]^DIONI^KARL^ANNAMARIA     [facility]  Echocardiography Laboratory  [facility] [place] [address]     Name: MIKHAIL ESPINAL  MRN: [id number]  : 1965  Study Date: 2021 07:56 AM  Age: 55 yrs  Gender: Female  Patient Location: [facility]  Reason For Study: Respiratory Failure, Aortic Valve Disorder  Ordering Physician: KARL WHEATLEY  Referring Physician: KARL WHEATLEY  Performed By: [facility] Ariadna Garcia     BSA: 1.6 m2  Height: 65 in  Weight: 130 lb  HR: 62  BP: 107/71 mmHg  ______________________________________________________________________________  Procedure  Complete Portable Echo Adult.  ______________________________________________________________________________  Interpretation Summary     1. Left ventricular systolic function is severely reduced. The visual ejection  fraction is 25-30%.  2. The right ventricle is normal in structure, function and size.  3. The left atrium is mildly dilated.  4. Severe aortic stenosis; mean gradient 54 mmHg, peak velocity 4.4 m/sec,  calculated valve area 0.6 cm2. Patient has known history of bicuspid aortic  valve.  5. Compared with the prior report from an outside hospital dated 18, the  gradients across the aortic valve have further increased and the LV function  is now reduced.  ______________________________________________________________________________  Left Ventricle  The left ventricle is normal in size. There is mild concentric left  ventricular hypertrophy. Left ventricular systolic function is  severely  reduced. The visual ejection fraction is 25-30%. There is severe global  hypokinesia of the left ventricle.     Right Ventricle  The right ventricle is normal in structure, function and size.     Atria  The left atrium is mildly dilated. Right atrial size is normal.     Mitral Valve  The mitral valve is normal in structure and function. There is mild (1+)  mitral regurgitation.     Tricuspid Valve  The tricuspid valve is normal in structure and function. There is trace  tricuspid regurgitation.     Aortic Valve  There is mild (1+) aortic regurgitation. Severe valvular aortic stenosis.  Severe aortic stenosis; mean gradient 54 mmHg, peak velocity 4.4 m/sec,  calculated valve area 0.6 cm2. Patient has known history of bicuspid aortic  valve.     Pulmonic Valve  The pulmonic valve is normal in structure and function.     Vessels  Normal size ascending aorta. Dilated IVC; patient intubated.     Pericardium  There is no pericardial effusion.     Rhythm  Sinus rhythm was noted.  ______________________________________________________________________________  MMode/2D Measurements & Calculations  IVSd: 1.2 cm     LVIDd: 4.9 cm  LVIDs: 4.7 cm  LVPWd: 1.1 cm  FS: 5.2 %  LV mass(C)d: 223.1 grams  LV mass(C)dI: 135.4 grams/m2  Ao root diam: 2.6 cm  asc Aorta Diam: 3.3 cm  LVOT diam: 2.2 cm  LVOT area: 3.9 cm2  LA Volume (BP): 54.7 ml  LA Volume Index (BP): 33.2 ml/m2  RWT: 0.45     Doppler Measurements & Calculations  MV E max abbe: 92.5 cm/sec  MV A max abbe: 92.5 cm/sec  MV E/A: 1.0  MV max PG: 3.2 mmHg  MV mean P.7 mmHg  MV V2 VTI: 35.9 cm  MVA(VTI): 1.9 cm2  MV dec time: 0.21 sec  Ao V2 max: 436.0 cm/sec  Ao max P.1 mmHg  Ao V2 mean: 360.4 cm/sec  Ao mean P.2 mmHg  Ao V2 VTI: 123.3 cm  PAMELLA(I,D): 0.56 cm2  PAMELLA(V,D): 0.56 cm2  AI P1/2t: 482.4 msec  LV V1 max P.6 mmHg  LV V1 max: 62.4 cm/sec  LV V1 VTI: 17.6 cm  SV(LVOT): 68.6 ml  SI(LVOT): 41.7 ml/m2  PA acc time: 0.13 sec  AV Abbe Ratio (DI):  0.14  PAMELLA Index (cm2/m2): 0.34  E/E' av.4  Lateral E/e': 22.8  Medial E/e': 29.9     ______________________________________________________________________________  Report approved by: Bob Castaneda 2021 09:03 AM         CT Dental wo Contrast    Narrative    CT SCAN OF THE FACE WITHOUT CONTRAST 2021 4:47 PM     HISTORY: Preoperative AVR clearance    TECHNIQUE: Axial CT images of the facial bones were completed with  sagittal and coronal reformations. A Panorex curved planar reformatted  sequence was created. Radiation dose for this scan was reduced using  automated exposure control, adjustment of the mA and/or kV according  to patient size, or iterative reconstruction technique.     COMPARISON: None.    FINDINGS: No significant carious lesions are identified. There is  dental amalgam of multiple maxillary teeth which causes streak  artifact. No suspicious periapical lucency to suggest apical  periodontitis.     Moderate mucosal thickening in the inferior maxillary sinuses.    Visualized intracranial structures are unremarkable. No suspicious  masses within the visualized neck.      Impression    IMPRESSION: No suspicious carious lesions or periapical lucency around  any of the teeth.    MALIKA CASTILLO MD         SYSTEM ID:  [id number]            Assessment and Plan:     Neurology/Psychiatry:   # Anxiety  # Alcohol intoxication - 0.22 on admission, no signs of alcohol withdrawal currently.  History of anxiety and reports of being anxious prior to intubation.  At the current time no definitive signs of alcohol withdrawal. Notably, I spoke to the patient and  regarding her alcohol use today. The patient states that she drinks infrequently and only let loose while on a boat earlier in the day Saturday, the day prior to admission.  However, her  states that he found the patient vomiting in the bathroom Saturday evening holding a bottle a vodka, and since admission, found an empty vodka  bottle in the patient's office closet. He is concerned that the patient had been drinking more than she discloses, while the patient is interested in seeing chemical dependency further in her clinical course.  Plan  - Acetaminophen PRN  - Atarax PRN  - Hold PTA duloxetine and lorazepam. She was recently started on duloxetine by her PCP and has not yet started this medication.  - Delirium precautions  - CIWA protocol  - Chemical dependency consult      Cardiovascular:   # Acute decompensated congestive Heart Failure secondary to aortic Stenosis with severely reduced ejection fraction.  Net fluid balance is now about 3 L negative.  No signs at this time of significant volume overload. Cardiology and CV surgery following. Plan for mechanical AVR this visit with pre-op workup.  Plan  - Cardiology and cardiothoracic surgery consultations for planning of aortic valve replacement. Heart cath today with plan for mechanical AVR this hospital stay  - Hold PTA losartan. Patient was not regularly taking PTA.      Pulmonary/Ventilator Management:   Extubated 8/16 and tolerating this well. Currently saturating 94% on 2 L nasal cannula  Plan  - Supplemental O2 to keep sats above 92%      GI and Nutrition:   No issues.  Plan  - NPO pending heart cath  - Otherwise low saturated fat diet      Renal/Fluids/Electrolytes:   # Lactic Acidosis.   Lactate in the ED to 3.1, likely to due acute CHF, down to 0.5 on recheck. Resolved.  BMP wnl, last creatinine of 0.81.  Plan  - monitor function and electrolytes as needed with replacement per ICU protocols.   - generally avoid nephrotoxic agents  - Continue Lassiter  - Follow I/O's as appropriate  - folate and thiamine replacement      Infectious Disease:   No issues.      Endocrine:   No issues.  Plan  - ICU insulin protocol, goal sugar <180      Hematology/Oncology:   No issues.      IV/Access:   Venous access - pIV x3    ICU Prophylaxis:   1. DVT: LMWH and mechanical  2. Stress Ulcer: PPI  3.  Wound care: Per unit protocol  4. Feeding: NPO  5. Family Update: Updated  at bedside  8. Disposition - ICU    Renuka Nico, MS4  Medical Student       Physician Attestation   I, Leslie Maier, was present with the medical/SUZETTE student who participated in the service and in the documentation of the note.  I have verified the history and personally performed the physical exam and medical decision making.  I agree with the assessment and plan of care as documented in the note.      I personally reviewed vital signs, medications, labs, imaging and notes.    Extubated yesterday afternoon and doing well.  Planning on going for mitral valve replacement later this week.  No signs of alcohol withdrawal.  Patient is interested in discussing further the possibility of chemical dependency treatment.  Will transfer to the floor.  We will keep euvolemic.    Leslie Maier MD  Date of Service (when I saw the patient): 08/17/21  [phone]

## 2021-08-17 NOTE — PROGRESS NOTES
Interim summary dictated.  #[id number]    55F hx of bicuspid AV and AORTIC STENOSIS admitted 8/15 to ICU with acute respiratory failure and diagnosed with critical aortic stenosis with plans for surgical AVR on Thursday.  Transfer to [facility] and continue to monitor for ETOH withdrawal.  No scheduled diuresis per cardiology as volume status is tenuous.      Petar Emery D.O.   8/17/2021

## 2021-08-18 ENCOUNTER — ANESTHESIA EVENT (OUTPATIENT)
Dept: SURGERY | Facility: CLINIC | Age: 56
End: 2021-08-18
Payer: COMMERCIAL

## 2021-08-18 LAB
ABO/RH(D): NORMAL
ANTIBODY SCREEN: NEGATIVE
CREAT SERPL-MCNC: 0.74 MG/DL (ref 0.52–1.04)
GFR SERPL CREATININE-BSD FRML MDRD: >90 ML/MIN/1.73M2
MAGNESIUM SERPL-MCNC: 2.2 MG/DL (ref 1.6–2.3)
PLATELET # BLD AUTO: 230 10E3/UL (ref 150–450)
POTASSIUM BLD-SCNC: 3.8 MMOL/L (ref 3.4–5.3)

## 2021-08-18 PROCEDURE — 84132 ASSAY OF SERUM POTASSIUM: CPT | Performed by: INTERNAL MEDICINE

## 2021-08-18 PROCEDURE — 210N000002 HC R&B HEART CARE

## 2021-08-18 PROCEDURE — 99233 SBSQ HOSP IP/OBS HIGH 50: CPT | Performed by: INTERNAL MEDICINE

## 2021-08-18 PROCEDURE — 99232 SBSQ HOSP IP/OBS MODERATE 35: CPT | Performed by: INTERNAL MEDICINE

## 2021-08-18 PROCEDURE — 250N000013 HC RX MED GY IP 250 OP 250 PS 637: Performed by: INTERNAL MEDICINE

## 2021-08-18 PROCEDURE — 36415 COLL VENOUS BLD VENIPUNCTURE: CPT | Performed by: INTERNAL MEDICINE

## 2021-08-18 PROCEDURE — 86901 BLOOD TYPING SEROLOGIC RH(D): CPT | Performed by: INTERNAL MEDICINE

## 2021-08-18 PROCEDURE — 99207 PR CDG-MDM COMPONENT: MEETS MODERATE - DOWN CODED: CPT | Performed by: INTERNAL MEDICINE

## 2021-08-18 PROCEDURE — 82565 ASSAY OF CREATININE: CPT | Performed by: INTERNAL MEDICINE

## 2021-08-18 PROCEDURE — 85049 AUTOMATED PLATELET COUNT: CPT | Performed by: INTERNAL MEDICINE

## 2021-08-18 PROCEDURE — 83735 ASSAY OF MAGNESIUM: CPT | Performed by: INTERNAL MEDICINE

## 2021-08-18 RX ADMIN — HYDROXYZINE HYDROCHLORIDE 25 MG: 25 TABLET ORAL at 16:32

## 2021-08-18 RX ADMIN — FOLIC ACID 1 MG: 1 TABLET ORAL at 08:44

## 2021-08-18 RX ADMIN — GABAPENTIN 900 MG: 300 CAPSULE ORAL at 20:50

## 2021-08-18 RX ADMIN — HYDROXYZINE HYDROCHLORIDE 25 MG: 25 TABLET ORAL at 22:37

## 2021-08-18 RX ADMIN — THIAMINE HCL TAB 100 MG 100 MG: 100 TAB at 08:44

## 2021-08-18 RX ADMIN — THIAMINE HCL TAB 100 MG 100 MG: 100 TAB at 22:37

## 2021-08-18 RX ADMIN — PANTOPRAZOLE SODIUM 40 MG: 40 TABLET, DELAYED RELEASE ORAL at 06:48

## 2021-08-18 RX ADMIN — HYDROXYZINE HYDROCHLORIDE 25 MG: 25 TABLET ORAL at 10:42

## 2021-08-18 RX ADMIN — MULTIPLE VITAMINS W/ MINERALS TAB 1 TABLET: TAB at 08:44

## 2021-08-18 RX ADMIN — GABAPENTIN 900 MG: 300 CAPSULE ORAL at 05:00

## 2021-08-18 RX ADMIN — GABAPENTIN 900 MG: 300 CAPSULE ORAL at 12:52

## 2021-08-18 ASSESSMENT — ACTIVITIES OF DAILY LIVING (ADL)
ADLS_ACUITY_SCORE: 18
ADLS_ACUITY_SCORE: 14
ADLS_ACUITY_SCORE: 18
ADLS_ACUITY_SCORE: 18

## 2021-08-18 ASSESSMENT — LIFESTYLE VARIABLES: TOBACCO_USE: 1

## 2021-08-18 ASSESSMENT — MIFFLIN-ST. JEOR: SCORE: 1172.39

## 2021-08-18 NOTE — PROGRESS NOTES
Spoke with NP Agnes with Cardiology this AM x2 if patient needs to be on any medications due to new reduced EF. Reported needed to review H&P more before deciding.

## 2021-08-18 NOTE — ANESTHESIA PREPROCEDURE EVALUATION
Anesthesia Pre-Procedure Evaluation    Patient: Mayda Lezama   MRN: [id number] : 1965        Preoperative Diagnosis: Aortic stenosis [I35.0]   Procedure : Procedure(s):  REPLACEMENT, AORTIC VALVE     Past Medical History:   Diagnosis Date     Migraines       Past Surgical History:   Procedure Laterality Date     CV CORONARY ANGIOGRAM N/A 2021    Procedure: Coronary Angiogram;  Surgeon: Hira Greenfield MD;  Location:  [facility]ATH LAB     CV RIGHT AND LEFT HEART CATH N/A 2021    Procedure: CV RIGHT AND LEFT HEART CATH;  Surgeon: Hira Greenfield MD;  Location:  HE[facility]ARD[facility] CATH LAB      Allergies   Allergen Reactions     Metoclopramide Other (See Comments)      Social History     Tobacco Use     Smoking status: Current Every Day Smoker     Packs/day: 0.50   Substance Use Topics     Alcohol use: Yes      Wt Readings from Last 1 Encounters:   21 58.2 kg (128 lb 3.2 oz)        Anesthesia Evaluation            ROS/MED HX  ENT/Pulmonary:     (+) tobacco use, Current use, 1 packs/day,     Neurologic:     (+) migraines,     Cardiovascular: Comment: Critical bicuspid aortic stenosis (mean gradient 54 mmHg, PAMELLA 0.6 cm  in the setting of low flow these findings are consistent with critical aortic stenosis (LVEF 25 to 30%) Left and right heart cath (): Moderately elevated right and left sided filling pressures, no obstructive CAD    (+) -----CHF valvular problems/murmurs type: AS bicuspid aortic valve.     METS/Exercise Tolerance:     Hematologic:       Musculoskeletal:       GI/Hepatic:       Renal/Genitourinary:       Endo:       Psychiatric/Substance Use:     (+) psychiatric history anxiety alcohol abuse     Infectious Disease:       Malignancy:       Other:            Physical Exam    Airway             Respiratory Devices and Support         Dental           Cardiovascular           (+) murmur       Pulmonary                   OUTSIDE LABS:  CBC:   Lab Results    Component Value Date    WBC 6.3 08/16/2021    WBC 10.6 08/15/2021    HGB 13.0 08/16/2021    HGB 15.1 08/15/2021    HCT 38.1 08/16/2021    HCT 44.0 08/15/2021     08/18/2021     08/16/2021     BMP:   Lab Results   Component Value Date     08/17/2021     08/16/2021    POTASSIUM 3.8 08/18/2021    POTASSIUM 3.6 08/17/2021    POTASSIUM 3.5 08/17/2021    CHLORIDE 105 08/17/2021    CHLORIDE 107 08/16/2021    CO2 26 08/17/2021    CO2 24 08/16/2021    BUN 15 08/17/2021    BUN 12 08/16/2021    CR 0.74 08/18/2021    CR 0.86 08/17/2021     (H) 08/17/2021    GLC 84 08/17/2021     COAGS: No results found for: PTT, INR, FIBR  POC: No results found for: BGM, HCG, HCGS  HEPATIC:   Lab Results   Component Value Date    ALBUMIN 4.2 08/15/2021    PROTTOTAL 7.5 08/15/2021    ALT 39 08/15/2021    AST 39 08/15/2021    ALKPHOS 97 08/15/2021    BILITOTAL 0.7 08/15/2021     OTHER:   Lab Results   Component Value Date    PH 7.44 (H) 08/17/2021    LACT 0.4 08/17/2021    LACT 0.4 08/17/2021    A1C 5.0 08/16/2021    KARLY 8.6 08/17/2021    PHOS 3.7 08/16/2021    MAG 2.2 08/18/2021    LIPASE 133 08/15/2021    TSH 0.66 08/15/2021       Anesthesia Plan    ASA Status:  4   NPO Status:  NPO Appropriate    Anesthesia Type: General.     - Airway: ETT   Induction: Intravenous, Etomidate.   Maintenance: Balanced.   Techniques and Equipment:     - Lines/Monitors: 2nd IV, Arterial Line, Central Line, PAC, CVP, DEVAUGHN            DEVAUGHN Absolute Contra-indication: NONE            DEVAUGHN Relative Contra-indication: NONE     - Blood: Blood in Room     - Drips/Meds: Fentanyl, Epinephrine     Consents    Anesthesia Plan(s) and associated risks, benefits, and realistic alternatives discussed. Questions answered and patient/representative(s) expressed understanding.     - Discussed with:  Patient      - Extended Intubation/Ventilatory Support Discussed: No.      - Patient is DNR/DNI Status: No    Use of blood products discussed: Yes.     -  Discussed with: Patient.     - Consented: consented to blood products            Reason for refusal: other.     Postoperative Care    Pain management: IV analgesics, Oral pain medications, Multi-modal analgesia.   PONV prophylaxis: Ondansetron (or other 5HT-3), Dexamethasone or Solumedrol     Comments:    Arterial line in pre-op.  Slow controlled induction/ETT, DEVAUGHN in, CVL + PAC (in OR), no BRIDGET                Petar Albarado, DO

## 2021-08-18 NOTE — PROGRESS NOTES
"Vitals: WNL  Lungs: WNL  CV: NSR/ST with murmer.   GI: WNL  Uro: WNL  CMS: WNL  Neuro: no deficits noted   Skin: no concerns noted Angio sites WNL. Tegaderm and band aid intact. Plan for shower after dinner tonight- pending   Psych: WNL, calm, cooperative, and bright during conversation.   MSK: WNL Indp   Pain: denies   Labs: no concerns noted today   Tests/Procedures: Plan for SAVR tomorrow. Procedure start at 7:20am.    Other:   Handouts on low sodium diet, left sided CHF, and AVS given. Pt. Watched surgical videos.   Pt. Appears to have poor insight. When attempting education would quickly state \"Oh, yeah I don't eat a lot of sodium\" or \"My sister is a nurse, I'll be ok.\" Pt had [email or web address] and [employer] brought into the hospital.     "

## 2021-08-18 NOTE — PROGRESS NOTES
[facility]    Hospitalist Progress Note    Assessment & Plan   Ms. Lezama is a 55-year-old female with a past medical history significant for known bicuspid aortic valve with aortic stenosis, alcohol use, hypertension and anxiety, who presents to the Emergency Department with shortness of breath, palpitations and found to have critical aortic stenosis.      Acute hypoxic respiratory failure due to acutely decompensated congestive heart failure from critical aortic stenosis:  -  The patient's respiratory status has improved with IV diuresis and she has subsequently been extubated on 8/16   - Cardiology and CV surgery are following and the patient is tentatively scheduled for a surgical aortic valve replacement on Thursday, 08/19/2021, as she is too tenuous to discharge to home.   - Cardiology has recommended against any scheduled diuresis given her tenuous hemodynamics.  -  Echocardiogram as above shows a newly depressed EF of 25-30% with severe aortic stenosis and a calculated valve area of 0.6 cm2, mean gradient of 54 mmHg and peak velocity of 4.4 meters/second.   - The patient will continue to be in the hospital until surgery on Thursday.   - monitor hemodynamics closely as well as her respiratory and volume status.  -Left and right heart cath (8/17): Moderately elevated right and left sided filling pressures, no obstructive CAD.  -I/O: -4.4L. wt 56.7kg admission---> 58.2kg  -pt feeling fine. No cardiopulmonary sxs currently. Euvolemic. Lungs clear    -  Appreciate Cardiology and CV Surgery consult.  -periop orders per surgery  - am labs, hold lovenox   -SAVR tomorrow with dr. Montaño      Non-ST elevation myocardial infarction:   - The patient had an angiogram showing minimal disease at 20% in the LAD and RCA, suggesting this is demand ischemia from the critical aortic stenosis and acutely decompensated CHF.    -no cp  -Continue with aspirin and surgical plan as above.    Alcohol use:    - The extent of her drinking is not entirely clear and she has not definitively gone through any alcohol withdrawal though was sedated with propofol, so we will certainly need to continue to monitor.   - There is some concern that she is drinking more than she lets on.  ]  - for now, we will continue with Neurontin, multivitamin, thiamine and folate and monitor closely for evidence of alcohol withdrawal.    - counseled her today on the need to minimize alcohol use going forward and CD counselor has been requested.  -hold neurontin if sedated.   -CIWA score of 4 overnight.   -alert. No clear signs etoh w/d currently. Oriented fully       Depression with anxiety colon:   -Cymbalta is on her PTA list, but she has not yet began this  -.  We will consider starting this prior to discharge.      Tobacco dependence:  The patient smokes 1 pack of cigarettes per day and was counseled on the need to quit.  She declined nicotine patch at this point.      Deep venous thrombosis prophylaxis:  Enoxaparin, which can be held the night before surgery.---> held    Full code. Discussed 8/18      Disposition:    -Surgical aortic valve replacement is scheduled for Thursday of this week and would anticipate an additional 4-5 days after that, so potentially discharge would be early next week.         Romeo Watkins MD  Text Page  (7am to 6pm)  Interval History   Feels fine  No sob, n/v/d/abd pain.   No cp      -Data reviewed today: I reviewed all new labs and imaging results over the last 24 hours. I personally reviewed labs and imaging last 24 hours.     Physical Exam   Temp: 98.2  F (36.8  C) Temp src: Oral BP: (!) 143/96 Pulse: 88   Resp: 20 SpO2: 91 % O2 Device: None (Room air) Oxygen Delivery: 4 LPM  Vitals:    08/16/21 0400 08/17/21 0400 08/18/21 0603   Weight: 59.3 kg (130 lb 11.7 oz) 59.4 kg (130 lb 15.3 oz) 58.2 kg (128 lb 3.2 oz)     Vital Signs with Ranges  Temp:  [98.2  F (36.8  C)-99.5  F (37.5  C)] 98.2  F (36.8   C)  Pulse:  [] 88  Resp:  [13-26] 20  BP: (106-143)/(62-96) 143/96  SpO2:  [91 %-98 %] 91 %  I/O last 3 completed shifts:  In: 350 [P.O.:300; I.V.:50]  Out: 800 [Urine:800]    Constitutional: Up in chair, nad  Neck: nl jvp  Respiratory: CTAB, breathing easiliy   Cardiovascular: RRR no r/g. 3/6 systolic murmur RUSB   GI: soft, nd, nd  Skin/Integumen: no rash or edema  Neuro: fully oriented, alert, coherent.   Nl finger nose finger, no tremors  Psych- nl affect. Pleasant, cooperative.        Medications       enoxaparin ANTICOAGULANT  40 mg Subcutaneous Q24H     folic acid  1 mg Oral Daily     [START ON 8/23/2021] gabapentin  100 mg Oral Q8H     [START ON 8/21/2021] gabapentin  300 mg Oral Q8H     [START ON 8/19/2021] gabapentin  600 mg Oral Q8H     gabapentin  900 mg Oral Q8H     multivitamin w/minerals  1 tablet Oral Daily     pantoprazole  40 mg Oral QAM AC    Or     pantoprazole  40 mg Per Feeding Tube QAM AC    Or     pantoprazole (PROTONIX) IV  40 mg Intravenous QAM AC     thiamine  100 mg Oral TID     [START ON 8/22/2021] thiamine  100 mg Oral Daily       Data   Recent Labs   Lab 08/18/21  0601 08/17/21  0809 08/17/21  0515 08/16/21  1305 08/16/21  1017 08/16/21  0940 08/16/21  0454 08/15/21  1427   WBC  --   --   --   --   --   --  6.3 10.6   HGB  --   --   --   --   --   --  13.0 15.1   MCV  --   --   --   --   --   --  94 95     --   --   --   --   --  203 302   NA  --   --  138  --   --   --  137 131*   POTASSIUM 3.8  --  3.5  3.6  --  3.7  --  3.0* 3.9   CHLORIDE  --   --  105  --   --   --  107 103   CO2  --   --  26  --   --   --  24 20   BUN  --   --  15  --   --   --  12 10   CR 0.74  --  0.86  --   --   --  0.81 0.72   ANIONGAP  --   --  7  --   --   --  6 8   KARLY  --   --  8.6  --   --   --  7.7* 8.8   GLC  --  155* 84  --   --  91 84 92   ALBUMIN  --   --   --   --   --   --   --  4.2   PROTTOTAL  --   --   --   --   --   --   --  7.5   BILITOTAL  --   --   --   --   --   --   --  0.7    ALKPHOS  --   --   --   --   --   --   --  97   ALT  --   --   --   --   --   --   --  39   AST  --   --   --   --   --   --   --  39   LIPASE  --   --   --   --   --   --   --  133   TROPONIN  --   --   --  0.272*  --   --   --  0.127*       Imaging:   Recent Results (from the past 24 hour(s))   Cardiac Catheterization    Narrative      Right sided filling pressures are moderately elevated.    Moderately elevated pulmonary artery hypertension.    Left sided filling pressures are moderately elevated.    Normal cardiac output level.    Ost LAD to Prox LAD lesion is 20% stenosed.    Prox RCA lesion is 25% stenosed.

## 2021-08-18 NOTE — PLAN OF CARE
A/O x 4.  CIWA - 5 for anxiety relieved by Atarax, reassurance and education on procedures.  Continued work up for Aortic valve replacement on Thursday.  Right and left heart cath completed today showing increased right and left filling pressures, moderate pulmonary hypertension with preserved cardiac output.  Hemodynamically stable.  SR 80-90's with murmur present.  Lung sounds are clear.  Mild dyspnea with exertion.  SpO2 ~ 90-94 on room air.  Eating without issues.  Lassiter catheter removed.  Due to void at time of transfer out of unit.  Receiving RN aware.  Education for open heart surgery reviewed.   at bedside.  All questions answered.  Transferred to heart center until procedure.

## 2021-08-18 NOTE — PROGRESS NOTES
[facility]  Cardiology Progress Note  Date of Service: 08/18/2021  Primary Cardiologist: New to cardiology (Dr. Amor initial consult)    Assessment & Plan   Mayda Lezama is a 55 year old female with past medical history significant for HTN, bicuspid aortic valve anxiety admitted on 8/15/2021 with SOB and palpitations. She was given a liter of IVF in the ED and went into acute pulmonary edema requiring intubation. Then was diuresed with IV Lasix and was able to be extubated.  CT PE was negative for PE and Covid is negative.  Echocardiogram revealed severely reduced LVEF at 25 to 30% with critical aortic stenosis (mean gradient 54 mmHg, PAMELLA 0.6 cm  in the setting of low flow these findings are consistent with critical AS).     Interval History   Symptomatically improved and off all oxygen.  Able to ambulate around her room without significant chest discomfort or shortness of breath    Assessment:   1. Critical aortic stenosis    mean gradient 54 mmHg, PAMELLA 0.6 cm  in the setting of low flow these findings are consistent with critical aortic stenosis    LVEF 25 to 30%    Left and right heart cath (8/17): Moderately elevated right and left sided filling pressures, no obstructive CAD    2. Hypertension    [PTA losartan 50 mg daily]    Plan:  1. SAVR tomorrow with Dr. Montaño  2. No afterload medications, nitrates and if patient becomes unstable please call cardiology for medication adjustments.    ABRIL BACON CNP  Pager:  [phone]   (7am - 5pm, M-F)    Physical Exam   Temp: 98.1  F (36.7  C) Temp src: Oral BP: 136/78 Pulse: 68   Resp: 20 SpO2: 94 % O2 Device: None (Room air) Oxygen Delivery: 4 LPM  Vitals:    08/16/21 0400 08/17/21 0400 08/18/21 0603   Weight: 59.3 kg (130 lb 11.7 oz) 59.4 kg (130 lb 15.3 oz) 58.2 kg (128 lb 3.2 oz)       Constitutional:   NAD   Skin:   Warm and dry   Head:   Nontraumatic   Neck:   no JVD   Lungs:   symmetric, clear to auscultation    Cardiovascular:   regular rate and rhythm, VENUS RUSB   Abdomen:   Bengin   Extremities and Back:   No edema   Neurological:   Grossly nonfocal     Medications       [Held by provider] enoxaparin ANTICOAGULANT  40 mg Subcutaneous Q24H     folic acid  1 mg Oral Daily     [START ON 8/23/2021] gabapentin  100 mg Oral Q8H     [START ON 8/21/2021] gabapentin  300 mg Oral Q8H     [START ON 8/19/2021] gabapentin  600 mg Oral Q8H     gabapentin  900 mg Oral Q8H     multivitamin w/minerals  1 tablet Oral Daily     pantoprazole  40 mg Oral QAM AC    Or     pantoprazole  40 mg Per Feeding Tube QAM AC    Or     pantoprazole (PROTONIX) IV  40 mg Intravenous QAM AC     thiamine  100 mg Oral TID     [START ON 8/22/2021] thiamine  100 mg Oral Daily       Code Status    Full Code    Past Medical History   I have reviewed this patient's medical history and updated it with pertinent information if needed.   Past Medical History:   Diagnosis Date     Migraines        Past Surgical History   I have reviewed this patient's surgical history and updated it with pertinent information if needed.  No past surgical history on file.    Allergies   Allergies   Allergen Reactions     Metoclopramide Other (See Comments)       Data     Most Recent 3 CBC's:Recent Labs   Lab Test 08/18/21  0601 08/16/21  0454 08/15/21  1427   WBC  --  6.3 10.6   HGB  --  13.0 15.1   MCV  --  94 95    203 302     Most Recent 3 BMP's:Recent Labs   Lab Test 08/18/21  0601 08/17/21  0809 08/17/21  0515 08/16/21  1017 08/16/21  0940 08/16/21  0454 08/15/21  1427   NA  --   --  138  --   --  137 131*   POTASSIUM 3.8  --  3.5  3.6 3.7  --  3.0* 3.9   CHLORIDE  --   --  105  --   --  107 103   CO2  --   --  26  --   --  24 20   BUN  --   --  15  --   --  12 10   CR 0.74  --  0.86  --   --  0.81 0.72   ANIONGAP  --   --  7  --   --  6 8   KARLY  --   --  8.6  --   --  7.7* 8.8   GLC  --  155* 84  --  91 84 92     Most Recent 3 BNP's:Recent Labs   Lab Test 08/15/21  4415    NTBNPI 3,205*     Most Recent Cholesterol Panel:No lab results found.

## 2021-08-18 NOTE — PROGRESS NOTES
[facility]    Cardiology Progress Note     Assessment & Plan     This is a 55 year old female with HTN, bicuspid aortic valve stenosis, anxiety who presents with shortness of breath and palpitations. In the ER she was given 1 liter NS and became hypoxic to the 80s with HR in the 140s, ended up urgently intubated and was then given IV lasix 60 mg when CXR showed pulmonary edema. BNP was elevated to 3200 and D-Dimer 0.94, TN 0.127. Her Na was 131. CTPE was negative for PE. COVID test initially negative. Admitted to ICU and cardiology consulted. Echocardiogram demonstrated severely reduced LVEF 25-30%, aortic stenosis with mean gradient 54 mmHg, PAMELLA 0.6 cm2 in setting of low flow, findings consistent with critical aortic stenosis.     Plan for SAVR tomorrow. Will start seroquel for anxiety management as well.      Will continue to follow.     Jen Amor MD  Text Page  (Monday - Friday, 8 am- 5 pm)    Interval History     No acute events overnight. Doing well. No symptoms. Voiced concerns over severe anxiety when intubated and fearful of when she will have to go through this again.   Physical Exam   Temp: 99.2  F (37.3  C) Temp src: Oral BP: (!) 142/90 Pulse: 81   Resp: 20 SpO2: 97 % O2 Device: None (Room air)    Vitals:    08/16/21 0400 08/17/21 0400 08/18/21 0603   Weight: 59.3 kg (130 lb 11.7 oz) 59.4 kg (130 lb 15.3 oz) 58.2 kg (128 lb 3.2 oz)     Vital Signs with Ranges  Temp:  [98.1  F (36.7  C)-99.3  F (37.4  C)] 99.2  F (37.3  C)  Pulse:  [68-88] 81  Resp:  [20] 20  BP: (127-146)/(78-97) 142/90  SpO2:  [91 %-97 %] 97 %  I/O last 3 completed shifts:  In: -   Out: 850 [Urine:850]  Patient Active Problem List   Diagnosis     Acute respiratory failure with hypoxia (H)     Acute congestive heart failure, unspecified heart failure type (H)     Bicuspid aortic valve     Aortic valve stenosis       Constitutional: Awake, alert, cooperative, no apparent distress  Respiratory: Clear to  auscultation bilaterally, no crackles or wheezing  Cardiovascular: Regular rate and rhythm, VENUS, RUSB limited S2.  GI: Normal bowel sounds, soft, non-distended, non-tender  Skin/Integumen: No rashes, no cyanosis, no edema      Medications       [Held by provider] enoxaparin ANTICOAGULANT  40 mg Subcutaneous Q24H     folic acid  1 mg Oral Daily     [START ON 8/23/2021] gabapentin  100 mg Oral Q8H     [START ON 8/21/2021] gabapentin  300 mg Oral Q8H     [START ON 8/19/2021] gabapentin  600 mg Oral Q8H     gabapentin  900 mg Oral Q8H     multivitamin w/minerals  1 tablet Oral Daily     pantoprazole  40 mg Oral QAM AC    Or     pantoprazole  40 mg Per Feeding Tube QAM AC    Or     pantoprazole (PROTONIX) IV  40 mg Intravenous QAM AC     thiamine  100 mg Oral TID     [START ON 8/22/2021] thiamine  100 mg Oral Daily       Data   Results for orders placed or performed during the hospital encounter of 08/15/21 (from the past 24 hour(s))   Platelet count   Result Value Ref Range    Platelet Count 230 150 - 450 10e3/uL   Creatinine   Result Value Ref Range    Creatinine 0.74 0.52 - 1.04 mg/dL    GFR Estimate >90 >60 mL/min/1.73m2   Potassium   Result Value Ref Range    Potassium 3.8 3.4 - 5.3 mmol/L   Magnesium   Result Value Ref Range    Magnesium 2.2 1.6 - 2.3 mg/dL

## 2021-08-18 NOTE — PROGRESS NOTES
CVTS    Patient seen with Dr. Montaño   Plan for aortic valve replacement tomorrow am with mechanical valve  Discussed this recommendation with patient and some expectations post operatively including lifelong need for coumadin.    Krystyna Rico PA-C  CV surgery

## 2021-08-19 ENCOUNTER — APPOINTMENT (OUTPATIENT)
Dept: GENERAL RADIOLOGY | Facility: CLINIC | Age: 56
End: 2021-08-19
Attending: STUDENT IN AN ORGANIZED HEALTH CARE EDUCATION/TRAINING PROGRAM
Payer: COMMERCIAL

## 2021-08-19 ENCOUNTER — ANESTHESIA (OUTPATIENT)
Dept: SURGERY | Facility: CLINIC | Age: 56
End: 2021-08-19
Payer: COMMERCIAL

## 2021-08-19 LAB
ALBUMIN SERPL-MCNC: 2.7 G/DL (ref 3.4–5)
ALP SERPL-CCNC: 64 U/L (ref 40–150)
ALT SERPL W P-5'-P-CCNC: 32 U/L (ref 0–50)
ANION GAP SERPL CALCULATED.3IONS-SCNC: 4 MMOL/L (ref 3–14)
ANION GAP SERPL CALCULATED.3IONS-SCNC: 5 MMOL/L (ref 3–14)
ANION GAP SERPL CALCULATED.3IONS-SCNC: 5 MMOL/L (ref 3–14)
APTT PPP: 101 SECONDS (ref 22–38)
APTT PPP: 69 SECONDS (ref 22–38)
AST SERPL W P-5'-P-CCNC: 55 U/L (ref 0–45)
BASE EXCESS BLDA CALC-SCNC: -1.6 MMOL/L (ref -9–1.8)
BASE EXCESS BLDV CALC-SCNC: -1.3 MMOL/L (ref -7.7–1.9)
BILIRUB SERPL-MCNC: 0.5 MG/DL (ref 0.2–1.3)
BLD PROD TYP BPU: NORMAL
BLOOD COMPONENT TYPE: NORMAL
BUN SERPL-MCNC: 11 MG/DL (ref 7–30)
BUN SERPL-MCNC: 8 MG/DL (ref 7–30)
BUN SERPL-MCNC: 8 MG/DL (ref 7–30)
CA-I BLD-MCNC: 4.6 MG/DL (ref 4.4–5.2)
CALCIUM SERPL-MCNC: 8.1 MG/DL (ref 8.5–10.1)
CALCIUM SERPL-MCNC: 8.6 MG/DL (ref 8.5–10.1)
CALCIUM SERPL-MCNC: 8.7 MG/DL (ref 8.5–10.1)
CHLORIDE BLD-SCNC: 109 MMOL/L (ref 94–109)
CHLORIDE BLD-SCNC: 111 MMOL/L (ref 94–109)
CHLORIDE BLD-SCNC: 112 MMOL/L (ref 94–109)
CO2 SERPL-SCNC: 24 MMOL/L (ref 20–32)
CO2 SERPL-SCNC: 26 MMOL/L (ref 20–32)
CO2 SERPL-SCNC: 26 MMOL/L (ref 20–32)
CODING SYSTEM: NORMAL
CREAT SERPL-MCNC: 0.74 MG/DL (ref 0.52–1.04)
CREAT SERPL-MCNC: 0.76 MG/DL (ref 0.52–1.04)
CREAT SERPL-MCNC: 0.8 MG/DL (ref 0.52–1.04)
CROSSMATCH: NORMAL
ERYTHROCYTE [DISTWIDTH] IN BLOOD BY AUTOMATED COUNT: 12.5 % (ref 10–15)
ERYTHROCYTE [DISTWIDTH] IN BLOOD BY AUTOMATED COUNT: 12.5 % (ref 10–15)
ERYTHROCYTE [DISTWIDTH] IN BLOOD BY AUTOMATED COUNT: 12.6 % (ref 10–15)
FIBRINOGEN PPP-MCNC: 291 MG/DL (ref 170–490)
GFR SERPL CREATININE-BSD FRML MDRD: 83 ML/MIN/1.73M2
GFR SERPL CREATININE-BSD FRML MDRD: 88 ML/MIN/1.73M2
GFR SERPL CREATININE-BSD FRML MDRD: >90 ML/MIN/1.73M2
GLUCOSE BLD-MCNC: 163 MG/DL (ref 70–99)
GLUCOSE BLD-MCNC: 172 MG/DL (ref 70–99)
GLUCOSE BLD-MCNC: 99 MG/DL (ref 70–99)
GLUCOSE BLDC GLUCOMTR-MCNC: 142 MG/DL (ref 70–99)
GLUCOSE BLDC GLUCOMTR-MCNC: 152 MG/DL (ref 70–99)
GLUCOSE BLDC GLUCOMTR-MCNC: 160 MG/DL (ref 70–99)
GLUCOSE BLDC GLUCOMTR-MCNC: 172 MG/DL (ref 70–99)
HCO3 BLD-SCNC: 24 MMOL/L (ref 21–28)
HCO3 BLDV-SCNC: 25 MMOL/L (ref 21–28)
HCT VFR BLD AUTO: 29 % (ref 35–47)
HCT VFR BLD AUTO: 33.4 % (ref 35–47)
HCT VFR BLD AUTO: 41.3 % (ref 35–47)
HGB BLD-MCNC: 11.1 G/DL (ref 11.7–15.7)
HGB BLD-MCNC: 13.9 G/DL (ref 11.7–15.7)
HGB BLD-MCNC: 9.7 G/DL (ref 11.7–15.7)
INR PPP: 1.28 (ref 0.85–1.15)
INR PPP: 1.46 (ref 0.85–1.15)
ISSUE DATE AND TIME: NORMAL
ISSUE DATE AND TIME: NORMAL
LACTATE SERPL-SCNC: 1 MMOL/L (ref 0.7–2)
MAGNESIUM SERPL-MCNC: 3.4 MG/DL (ref 1.6–2.3)
MCH RBC QN AUTO: 32.3 PG (ref 26.5–33)
MCH RBC QN AUTO: 32.8 PG (ref 26.5–33)
MCH RBC QN AUTO: 32.9 PG (ref 26.5–33)
MCHC RBC AUTO-ENTMCNC: 33.2 G/DL (ref 31.5–36.5)
MCHC RBC AUTO-ENTMCNC: 33.4 G/DL (ref 31.5–36.5)
MCHC RBC AUTO-ENTMCNC: 33.7 G/DL (ref 31.5–36.5)
MCV RBC AUTO: 96 FL (ref 78–100)
MCV RBC AUTO: 98 FL (ref 78–100)
MCV RBC AUTO: 99 FL (ref 78–100)
O2/TOTAL GAS SETTING VFR VENT: 50 %
O2/TOTAL GAS SETTING VFR VENT: 50 %
OXYHGB MFR BLD: 94 % (ref 92–100)
OXYHGB MFR BLDV: 67 % (ref 70–75)
PCO2 BLD: 43 MM HG (ref 35–45)
PCO2 BLDV: 48 MM HG (ref 40–50)
PH BLD: 7.36 [PH] (ref 7.35–7.45)
PH BLDV: 7.33 [PH] (ref 7.32–7.43)
PHOSPHATE SERPL-MCNC: 4.2 MG/DL (ref 2.5–4.5)
PLATELET # BLD AUTO: 150 10E3/UL (ref 150–450)
PLATELET # BLD AUTO: 161 10E3/UL (ref 150–450)
PLATELET # BLD AUTO: 238 10E3/UL (ref 150–450)
PO2 BLD: 83 MM HG (ref 80–105)
PO2 BLDV: 40 MM HG (ref 25–47)
POTASSIUM BLD-SCNC: 3.7 MMOL/L (ref 3.4–5.3)
POTASSIUM BLD-SCNC: 3.7 MMOL/L (ref 3.4–5.3)
POTASSIUM BLD-SCNC: 3.9 MMOL/L (ref 3.4–5.3)
POTASSIUM BLD-SCNC: 4.1 MMOL/L (ref 3.4–5.3)
PROT SERPL-MCNC: 5 G/DL (ref 6.8–8.8)
RBC # BLD AUTO: 2.95 10E6/UL (ref 3.8–5.2)
RBC # BLD AUTO: 3.38 10E6/UL (ref 3.8–5.2)
RBC # BLD AUTO: 4.31 10E6/UL (ref 3.8–5.2)
SODIUM SERPL-SCNC: 140 MMOL/L (ref 133–144)
SODIUM SERPL-SCNC: 140 MMOL/L (ref 133–144)
SODIUM SERPL-SCNC: 142 MMOL/L (ref 133–144)
UNIT ABO/RH: NORMAL
UNIT NUMBER: NORMAL
UNIT STATUS: NORMAL
UNIT TYPE ISBT: 5100
WBC # BLD AUTO: 10.4 10E3/UL (ref 4–11)
WBC # BLD AUTO: 12.1 10E3/UL (ref 4–11)
WBC # BLD AUTO: 7.4 10E3/UL (ref 4–11)

## 2021-08-19 PROCEDURE — 02RF0JZ REPLACEMENT OF AORTIC VALVE WITH SYNTHETIC SUBSTITUTE, OPEN APPROACH: ICD-10-PCS | Performed by: SURGERY

## 2021-08-19 PROCEDURE — 85730 THROMBOPLASTIN TIME PARTIAL: CPT | Performed by: STUDENT IN AN ORGANIZED HEALTH CARE EDUCATION/TRAINING PROGRAM

## 2021-08-19 PROCEDURE — 5A1221Z PERFORMANCE OF CARDIAC OUTPUT, CONTINUOUS: ICD-10-PCS | Performed by: SURGERY

## 2021-08-19 PROCEDURE — 250N000013 HC RX MED GY IP 250 OP 250 PS 637: Performed by: SURGERY

## 2021-08-19 PROCEDURE — 84295 ASSAY OF SERUM SODIUM: CPT | Performed by: SURGERY

## 2021-08-19 PROCEDURE — 250N000009 HC RX 250: Performed by: NURSE ANESTHETIST, CERTIFIED REGISTERED

## 2021-08-19 PROCEDURE — 85027 COMPLETE CBC AUTOMATED: CPT | Performed by: SURGERY

## 2021-08-19 PROCEDURE — 250N000011 HC RX IP 250 OP 636: Performed by: STUDENT IN AN ORGANIZED HEALTH CARE EDUCATION/TRAINING PROGRAM

## 2021-08-19 PROCEDURE — 410N000005 HC PER-PERFUSION, SH ONLY, 1ST 30 MIN: Performed by: SURGERY

## 2021-08-19 PROCEDURE — 250N000013 HC RX MED GY IP 250 OP 250 PS 637: Performed by: INTERNAL MEDICINE

## 2021-08-19 PROCEDURE — 250N000011 HC RX IP 250 OP 636: Performed by: SURGERY

## 2021-08-19 PROCEDURE — 258N000003 HC RX IP 258 OP 636: Performed by: NURSE ANESTHETIST, CERTIFIED REGISTERED

## 2021-08-19 PROCEDURE — 200N000001 HC R&B ICU

## 2021-08-19 PROCEDURE — 82330 ASSAY OF CALCIUM: CPT | Performed by: STUDENT IN AN ORGANIZED HEALTH CARE EDUCATION/TRAINING PROGRAM

## 2021-08-19 PROCEDURE — 85018 HEMOGLOBIN: CPT | Performed by: INTERNAL MEDICINE

## 2021-08-19 PROCEDURE — 82330 ASSAY OF CALCIUM: CPT | Performed by: SURGERY

## 2021-08-19 PROCEDURE — 250N000011 HC RX IP 250 OP 636: Performed by: INTERNAL MEDICINE

## 2021-08-19 PROCEDURE — 36415 COLL VENOUS BLD VENIPUNCTURE: CPT | Performed by: INTERNAL MEDICINE

## 2021-08-19 PROCEDURE — 250N000012 HC RX MED GY IP 250 OP 636 PS 637: Performed by: STUDENT IN AN ORGANIZED HEALTH CARE EDUCATION/TRAINING PROGRAM

## 2021-08-19 PROCEDURE — 250N000009 HC RX 250

## 2021-08-19 PROCEDURE — 250N000009 HC RX 250: Performed by: ANESTHESIOLOGY

## 2021-08-19 PROCEDURE — 250N000011 HC RX IP 250 OP 636: Performed by: ANESTHESIOLOGY

## 2021-08-19 PROCEDURE — 84100 ASSAY OF PHOSPHORUS: CPT | Performed by: STUDENT IN AN ORGANIZED HEALTH CARE EDUCATION/TRAINING PROGRAM

## 2021-08-19 PROCEDURE — 250N000009 HC RX 250: Performed by: STUDENT IN AN ORGANIZED HEALTH CARE EDUCATION/TRAINING PROGRAM

## 2021-08-19 PROCEDURE — 250N000009 HC RX 250: Performed by: SURGERY

## 2021-08-19 PROCEDURE — 93005 ELECTROCARDIOGRAM TRACING: CPT

## 2021-08-19 PROCEDURE — 85610 PROTHROMBIN TIME: CPT | Performed by: STUDENT IN AN ORGANIZED HEALTH CARE EDUCATION/TRAINING PROGRAM

## 2021-08-19 PROCEDURE — 250N000024 HC ISOFLURANE, PER MIN: Performed by: SURGERY

## 2021-08-19 PROCEDURE — 258N000003 HC RX IP 258 OP 636: Performed by: ANESTHESIOLOGY

## 2021-08-19 PROCEDURE — 85027 COMPLETE CBC AUTOMATED: CPT | Performed by: STUDENT IN AN ORGANIZED HEALTH CARE EDUCATION/TRAINING PROGRAM

## 2021-08-19 PROCEDURE — 250N000011 HC RX IP 250 OP 636

## 2021-08-19 PROCEDURE — P9016 RBC LEUKOCYTES REDUCED: HCPCS

## 2021-08-19 PROCEDURE — 85384 FIBRINOGEN ACTIVITY: CPT | Performed by: SURGERY

## 2021-08-19 PROCEDURE — 370N000017 HC ANESTHESIA TECHNICAL FEE, PER MIN: Performed by: SURGERY

## 2021-08-19 PROCEDURE — 82803 BLOOD GASES ANY COMBINATION: CPT | Performed by: SURGERY

## 2021-08-19 PROCEDURE — 88305 TISSUE EXAM BY PATHOLOGIST: CPT | Mod: TC | Performed by: SURGERY

## 2021-08-19 PROCEDURE — 272N000001 HC OR GENERAL SUPPLY STERILE: Performed by: SURGERY

## 2021-08-19 PROCEDURE — 258N000003 HC RX IP 258 OP 636

## 2021-08-19 PROCEDURE — 99291 CRITICAL CARE FIRST HOUR: CPT | Mod: 24 | Performed by: INTERNAL MEDICINE

## 2021-08-19 PROCEDURE — 84132 ASSAY OF SERUM POTASSIUM: CPT | Performed by: STUDENT IN AN ORGANIZED HEALTH CARE EDUCATION/TRAINING PROGRAM

## 2021-08-19 PROCEDURE — 250N000013 HC RX MED GY IP 250 OP 250 PS 637: Performed by: STUDENT IN AN ORGANIZED HEALTH CARE EDUCATION/TRAINING PROGRAM

## 2021-08-19 PROCEDURE — 250N000011 HC RX IP 250 OP 636: Performed by: NURSE ANESTHETIST, CERTIFIED REGISTERED

## 2021-08-19 PROCEDURE — 33405 REPLACEMENT AORTIC VALVE OPN: CPT | Mod: GC | Performed by: SURGERY

## 2021-08-19 PROCEDURE — 85730 THROMBOPLASTIN TIME PARTIAL: CPT | Performed by: SURGERY

## 2021-08-19 PROCEDURE — 86923 COMPATIBILITY TEST ELECTRIC: CPT

## 2021-08-19 PROCEDURE — 83605 ASSAY OF LACTIC ACID: CPT | Performed by: STUDENT IN AN ORGANIZED HEALTH CARE EDUCATION/TRAINING PROGRAM

## 2021-08-19 PROCEDURE — C1713 ANCHOR/SCREW BN/BN,TIS/BN: HCPCS | Performed by: SURGERY

## 2021-08-19 PROCEDURE — 85610 PROTHROMBIN TIME: CPT | Performed by: SURGERY

## 2021-08-19 PROCEDURE — P9041 ALBUMIN (HUMAN),5%, 50ML: HCPCS

## 2021-08-19 PROCEDURE — 80048 BASIC METABOLIC PNL TOTAL CA: CPT | Performed by: INTERNAL MEDICINE

## 2021-08-19 PROCEDURE — 999N000141 HC STATISTIC PRE-PROCEDURE NURSING ASSESSMENT: Performed by: SURGERY

## 2021-08-19 PROCEDURE — 999N000063 XR CHEST PORT 1 VIEW

## 2021-08-19 PROCEDURE — 258N000003 HC RX IP 258 OP 636: Performed by: SURGERY

## 2021-08-19 PROCEDURE — 999N000157 HC STATISTIC RCP TIME EA 10 MIN

## 2021-08-19 PROCEDURE — 360N000079 HC SURGERY LEVEL 6, PER MIN: Performed by: SURGERY

## 2021-08-19 PROCEDURE — 83735 ASSAY OF MAGNESIUM: CPT | Performed by: STUDENT IN AN ORGANIZED HEALTH CARE EDUCATION/TRAINING PROGRAM

## 2021-08-19 PROCEDURE — 278N000051 HC OR IMPLANT GENERAL: Performed by: SURGERY

## 2021-08-19 PROCEDURE — 258N000003 HC RX IP 258 OP 636: Performed by: STUDENT IN AN ORGANIZED HEALTH CARE EDUCATION/TRAINING PROGRAM

## 2021-08-19 PROCEDURE — 80048 BASIC METABOLIC PNL TOTAL CA: CPT | Performed by: STUDENT IN AN ORGANIZED HEALTH CARE EDUCATION/TRAINING PROGRAM

## 2021-08-19 PROCEDURE — 82805 BLOOD GASES W/O2 SATURATION: CPT | Performed by: SURGERY

## 2021-08-19 PROCEDURE — 410N000006: Performed by: SURGERY

## 2021-08-19 PROCEDURE — 82805 BLOOD GASES W/O2 SATURATION: CPT | Performed by: STUDENT IN AN ORGANIZED HEALTH CARE EDUCATION/TRAINING PROGRAM

## 2021-08-19 PROCEDURE — 80048 BASIC METABOLIC PNL TOTAL CA: CPT | Performed by: SURGERY

## 2021-08-19 DEVICE — AORTIC FLEXCUFF ROTATABLE MECHANICAL HEART VALVE
Type: IMPLANTABLE DEVICE | Site: HEART | Status: FUNCTIONAL
Brand: SJM REGENT™

## 2021-08-19 DEVICE — SU DEVICE ENDO COR KNOT QUICK LOAD 030850: Type: IMPLANTABLE DEVICE | Site: HEART | Status: FUNCTIONAL

## 2021-08-19 DEVICE — SU DEVICE COR-KNOT MINI 4X14MM 031350: Type: IMPLANTABLE DEVICE | Site: HEART | Status: FUNCTIONAL

## 2021-08-19 RX ORDER — HYDROMORPHONE HCL IN WATER/PF 6 MG/30 ML
0.2 PATIENT CONTROLLED ANALGESIA SYRINGE INTRAVENOUS
Status: DISCONTINUED | OUTPATIENT
Start: 2021-08-19 | End: 2021-08-24 | Stop reason: HOSPADM

## 2021-08-19 RX ORDER — GLYCOPYRROLATE 0.2 MG/ML
INJECTION, SOLUTION INTRAMUSCULAR; INTRAVENOUS PRN
Status: DISCONTINUED | OUTPATIENT
Start: 2021-08-19 | End: 2021-08-19

## 2021-08-19 RX ORDER — LIDOCAINE 40 MG/G
CREAM TOPICAL
Status: DISCONTINUED | OUTPATIENT
Start: 2021-08-19 | End: 2021-08-24 | Stop reason: HOSPADM

## 2021-08-19 RX ORDER — FAMOTIDINE 20 MG/1
20 TABLET, FILM COATED ORAL
Status: COMPLETED | OUTPATIENT
Start: 2021-08-19 | End: 2021-08-19

## 2021-08-19 RX ORDER — ETOMIDATE 2 MG/ML
INJECTION INTRAVENOUS PRN
Status: DISCONTINUED | OUTPATIENT
Start: 2021-08-19 | End: 2021-08-19

## 2021-08-19 RX ORDER — OXYCODONE HYDROCHLORIDE 5 MG/1
10 TABLET ORAL EVERY 4 HOURS PRN
Status: DISCONTINUED | OUTPATIENT
Start: 2021-08-19 | End: 2021-08-24 | Stop reason: HOSPADM

## 2021-08-19 RX ORDER — ASPIRIN 81 MG/1
81 TABLET, CHEWABLE ORAL DAILY
Status: DISCONTINUED | OUTPATIENT
Start: 2021-08-20 | End: 2021-08-24 | Stop reason: HOSPADM

## 2021-08-19 RX ORDER — EPINEPHRINE IN SOD CHLOR,ISO 1 MG/10 ML
SYRINGE (ML) INTRAVENOUS PRN
Status: DISCONTINUED | OUTPATIENT
Start: 2021-08-19 | End: 2021-08-19

## 2021-08-19 RX ORDER — VECURONIUM BROMIDE 1 MG/ML
INJECTION, POWDER, LYOPHILIZED, FOR SOLUTION INTRAVENOUS PRN
Status: DISCONTINUED | OUTPATIENT
Start: 2021-08-19 | End: 2021-08-19

## 2021-08-19 RX ORDER — HEPARIN SODIUM 5000 [USP'U]/.5ML
5000 INJECTION, SOLUTION INTRAVENOUS; SUBCUTANEOUS EVERY 8 HOURS
Status: DISCONTINUED | OUTPATIENT
Start: 2021-08-20 | End: 2021-08-23

## 2021-08-19 RX ORDER — LIDOCAINE 40 MG/G
CREAM TOPICAL
Status: DISCONTINUED | OUTPATIENT
Start: 2021-08-19 | End: 2021-08-19 | Stop reason: HOSPADM

## 2021-08-19 RX ORDER — HYDRALAZINE HYDROCHLORIDE 20 MG/ML
10 INJECTION INTRAMUSCULAR; INTRAVENOUS EVERY 30 MIN PRN
Status: DISCONTINUED | OUTPATIENT
Start: 2021-08-19 | End: 2021-08-24 | Stop reason: HOSPADM

## 2021-08-19 RX ORDER — SODIUM CHLORIDE, SODIUM LACTATE, POTASSIUM CHLORIDE, CALCIUM CHLORIDE 600; 310; 30; 20 MG/100ML; MG/100ML; MG/100ML; MG/100ML
INJECTION, SOLUTION INTRAVENOUS CONTINUOUS PRN
Status: DISCONTINUED | OUTPATIENT
Start: 2021-08-19 | End: 2021-08-19

## 2021-08-19 RX ORDER — OXYCODONE HYDROCHLORIDE 5 MG/1
5 TABLET ORAL EVERY 4 HOURS PRN
Status: DISCONTINUED | OUTPATIENT
Start: 2021-08-19 | End: 2021-08-24 | Stop reason: HOSPADM

## 2021-08-19 RX ORDER — LIDOCAINE HYDROCHLORIDE 10 MG/ML
INJECTION, SOLUTION INFILTRATION; PERINEURAL
Status: COMPLETED | OUTPATIENT
Start: 2021-08-19 | End: 2021-08-19

## 2021-08-19 RX ORDER — BISACODYL 10 MG
10 SUPPOSITORY, RECTAL RECTAL DAILY PRN
Status: DISCONTINUED | OUTPATIENT
Start: 2021-08-19 | End: 2021-08-24 | Stop reason: HOSPADM

## 2021-08-19 RX ORDER — ONDANSETRON 2 MG/ML
4 INJECTION INTRAMUSCULAR; INTRAVENOUS EVERY 6 HOURS PRN
Status: DISCONTINUED | OUTPATIENT
Start: 2021-08-19 | End: 2021-08-24 | Stop reason: HOSPADM

## 2021-08-19 RX ORDER — VANCOMYCIN HYDROCHLORIDE 1 G/200ML
1000 INJECTION, SOLUTION INTRAVENOUS
Status: COMPLETED | OUTPATIENT
Start: 2021-08-19 | End: 2021-08-19

## 2021-08-19 RX ORDER — PROTAMINE SULFATE 10 MG/ML
INJECTION, SOLUTION INTRAVENOUS PRN
Status: DISCONTINUED | OUTPATIENT
Start: 2021-08-19 | End: 2021-08-19

## 2021-08-19 RX ORDER — EPINEPHRINE IN 0.9 % SOD CHLOR 5 MG/250ML
.01-.1 PLASTIC BAG, INJECTION (ML) INTRAVENOUS CONTINUOUS PRN
Status: DISCONTINUED | OUTPATIENT
Start: 2021-08-19 | End: 2021-08-20

## 2021-08-19 RX ORDER — PHENYLEPHRINE HCL IN 0.9% NACL 50MG/250ML
.1-4 PLASTIC BAG, INJECTION (ML) INTRAVENOUS CONTINUOUS PRN
Status: DISCONTINUED | OUTPATIENT
Start: 2021-08-19 | End: 2021-08-20

## 2021-08-19 RX ORDER — VANCOMYCIN HYDROCHLORIDE 1 G/200ML
1000 INJECTION, SOLUTION INTRAVENOUS EVERY 12 HOURS
Status: COMPLETED | OUTPATIENT
Start: 2021-08-19 | End: 2021-08-20

## 2021-08-19 RX ORDER — SODIUM CHLORIDE 9 MG/ML
INJECTION, SOLUTION INTRAVENOUS CONTINUOUS PRN
Status: DISCONTINUED | OUTPATIENT
Start: 2021-08-19 | End: 2021-08-19

## 2021-08-19 RX ORDER — PANTOPRAZOLE SODIUM 40 MG/1
40 TABLET, DELAYED RELEASE ORAL DAILY
Status: DISCONTINUED | OUTPATIENT
Start: 2021-08-20 | End: 2021-08-24 | Stop reason: HOSPADM

## 2021-08-19 RX ORDER — HEPARIN SODIUM 1000 [USP'U]/ML
INJECTION, SOLUTION INTRAVENOUS; SUBCUTANEOUS PRN
Status: DISCONTINUED | OUTPATIENT
Start: 2021-08-19 | End: 2021-08-19

## 2021-08-19 RX ORDER — CHLORHEXIDINE GLUCONATE ORAL RINSE 1.2 MG/ML
10 SOLUTION DENTAL ONCE
Status: COMPLETED | OUTPATIENT
Start: 2021-08-19 | End: 2021-08-19

## 2021-08-19 RX ORDER — PROPOFOL 10 MG/ML
INJECTION, EMULSION INTRAVENOUS CONTINUOUS PRN
Status: DISCONTINUED | OUTPATIENT
Start: 2021-08-19 | End: 2021-08-19

## 2021-08-19 RX ORDER — NITROGLYCERIN 20 MG/100ML
INJECTION INTRAVENOUS CONTINUOUS PRN
Status: DISCONTINUED | OUTPATIENT
Start: 2021-08-19 | End: 2021-08-19

## 2021-08-19 RX ORDER — NEOSTIGMINE METHYLSULFATE 1 MG/ML
VIAL (ML) INJECTION PRN
Status: DISCONTINUED | OUTPATIENT
Start: 2021-08-19 | End: 2021-08-19

## 2021-08-19 RX ORDER — DEXTROSE MONOHYDRATE 25 G/50ML
25-50 INJECTION, SOLUTION INTRAVENOUS
Status: DISCONTINUED | OUTPATIENT
Start: 2021-08-19 | End: 2021-08-24 | Stop reason: HOSPADM

## 2021-08-19 RX ORDER — CEFAZOLIN SODIUM 2 G/100ML
2 INJECTION, SOLUTION INTRAVENOUS SEE ADMIN INSTRUCTIONS
Status: DISCONTINUED | OUTPATIENT
Start: 2021-08-19 | End: 2021-08-19 | Stop reason: HOSPADM

## 2021-08-19 RX ORDER — METHOCARBAMOL 750 MG/1
750 TABLET, FILM COATED ORAL EVERY 6 HOURS PRN
Status: DISCONTINUED | OUTPATIENT
Start: 2021-08-19 | End: 2021-08-24 | Stop reason: HOSPADM

## 2021-08-19 RX ORDER — PROPOFOL 10 MG/ML
5-75 INJECTION, EMULSION INTRAVENOUS CONTINUOUS
Status: DISCONTINUED | OUTPATIENT
Start: 2021-08-19 | End: 2021-08-20

## 2021-08-19 RX ORDER — AMOXICILLIN 250 MG
1 CAPSULE ORAL 2 TIMES DAILY
Status: DISCONTINUED | OUTPATIENT
Start: 2021-08-19 | End: 2021-08-24 | Stop reason: HOSPADM

## 2021-08-19 RX ORDER — ALBUTEROL SULFATE 0.83 MG/ML
2.5 SOLUTION RESPIRATORY (INHALATION)
Status: DISCONTINUED | OUTPATIENT
Start: 2021-08-19 | End: 2021-08-24 | Stop reason: HOSPADM

## 2021-08-19 RX ORDER — ACETAMINOPHEN 325 MG/1
975 TABLET ORAL EVERY 8 HOURS
Status: COMPLETED | OUTPATIENT
Start: 2021-08-19 | End: 2021-08-22

## 2021-08-19 RX ORDER — ONDANSETRON 4 MG/1
4 TABLET, ORALLY DISINTEGRATING ORAL EVERY 6 HOURS PRN
Status: DISCONTINUED | OUTPATIENT
Start: 2021-08-19 | End: 2021-08-24 | Stop reason: HOSPADM

## 2021-08-19 RX ORDER — POTASSIUM CHLORIDE 7.45 MG/ML
10 INJECTION INTRAVENOUS ONCE
Status: COMPLETED | OUTPATIENT
Start: 2021-08-19 | End: 2021-08-19

## 2021-08-19 RX ORDER — POLYETHYLENE GLYCOL 3350 17 G/17G
17 POWDER, FOR SOLUTION ORAL DAILY
Status: DISCONTINUED | OUTPATIENT
Start: 2021-08-20 | End: 2021-08-24 | Stop reason: HOSPADM

## 2021-08-19 RX ORDER — MUPIROCIN 20 MG/G
0.5 OINTMENT TOPICAL 2 TIMES DAILY
Status: DISCONTINUED | OUTPATIENT
Start: 2021-08-19 | End: 2021-08-23

## 2021-08-19 RX ORDER — SODIUM CHLORIDE 9 MG/ML
INJECTION, SOLUTION INTRAVENOUS CONTINUOUS
Status: DISCONTINUED | OUTPATIENT
Start: 2021-08-19 | End: 2021-08-23

## 2021-08-19 RX ORDER — DULOXETIN HYDROCHLORIDE 20 MG/1
20 CAPSULE, DELAYED RELEASE ORAL DAILY
Status: DISCONTINUED | OUTPATIENT
Start: 2021-08-19 | End: 2021-08-24 | Stop reason: HOSPADM

## 2021-08-19 RX ORDER — CEFAZOLIN SODIUM 2 G/100ML
2 INJECTION, SOLUTION INTRAVENOUS
Status: COMPLETED | OUTPATIENT
Start: 2021-08-19 | End: 2021-08-19

## 2021-08-19 RX ORDER — DEXTROSE MONOHYDRATE, SODIUM CHLORIDE, AND POTASSIUM CHLORIDE 50; 1.49; 4.5 G/1000ML; G/1000ML; G/1000ML
INJECTION, SOLUTION INTRAVENOUS CONTINUOUS
Status: DISCONTINUED | OUTPATIENT
Start: 2021-08-19 | End: 2021-08-22

## 2021-08-19 RX ORDER — SODIUM CHLORIDE, SODIUM LACTATE, POTASSIUM CHLORIDE, CALCIUM CHLORIDE 600; 310; 30; 20 MG/100ML; MG/100ML; MG/100ML; MG/100ML
INJECTION, SOLUTION INTRAVENOUS CONTINUOUS
Status: DISCONTINUED | OUTPATIENT
Start: 2021-08-19 | End: 2021-08-19 | Stop reason: HOSPADM

## 2021-08-19 RX ORDER — CEFAZOLIN SODIUM 1 G/3ML
1 INJECTION, POWDER, FOR SOLUTION INTRAMUSCULAR; INTRAVENOUS EVERY 8 HOURS
Status: COMPLETED | OUTPATIENT
Start: 2021-08-19 | End: 2021-08-20

## 2021-08-19 RX ORDER — HYDROMORPHONE HCL IN WATER/PF 6 MG/30 ML
0.4 PATIENT CONTROLLED ANALGESIA SYRINGE INTRAVENOUS
Status: DISCONTINUED | OUTPATIENT
Start: 2021-08-19 | End: 2021-08-24 | Stop reason: HOSPADM

## 2021-08-19 RX ORDER — ACETAMINOPHEN 325 MG/1
650 TABLET ORAL EVERY 4 HOURS PRN
Status: DISCONTINUED | OUTPATIENT
Start: 2021-08-22 | End: 2021-08-24 | Stop reason: HOSPADM

## 2021-08-19 RX ORDER — NICOTINE POLACRILEX 4 MG
15-30 LOZENGE BUCCAL
Status: DISCONTINUED | OUTPATIENT
Start: 2021-08-19 | End: 2021-08-24 | Stop reason: HOSPADM

## 2021-08-19 RX ADMIN — SODIUM CHLORIDE: 9 INJECTION, SOLUTION INTRAVENOUS at 08:09

## 2021-08-19 RX ADMIN — FENTANYL CITRATE 100 MCG: 50 INJECTION, SOLUTION INTRAMUSCULAR; INTRAVENOUS at 07:49

## 2021-08-19 RX ADMIN — POTASSIUM CHLORIDE 10 MEQ: 7.46 INJECTION, SOLUTION INTRAVENOUS at 13:56

## 2021-08-19 RX ADMIN — PHENYLEPHRINE HYDROCHLORIDE 50 MCG: 10 INJECTION INTRAVENOUS at 09:04

## 2021-08-19 RX ADMIN — MUPIROCIN 0.5 G: 20 OINTMENT TOPICAL at 20:13

## 2021-08-19 RX ADMIN — LIDOCAINE HYDROCHLORIDE 2 ML: 10 INJECTION, SOLUTION INFILTRATION; PERINEURAL at 07:00

## 2021-08-19 RX ADMIN — FENTANYL CITRATE 100 MCG: 50 INJECTION, SOLUTION INTRAMUSCULAR; INTRAVENOUS at 08:41

## 2021-08-19 RX ADMIN — EPINEPHRINE 20 MCG: 0.1 INJECTION, SOLUTION ENDOTRACHEAL; INTRACARDIAC; INTRAVENOUS at 11:04

## 2021-08-19 RX ADMIN — GABAPENTIN 600 MG: 300 CAPSULE ORAL at 20:12

## 2021-08-19 RX ADMIN — ROCURONIUM BROMIDE 70 MG: 10 INJECTION INTRAVENOUS at 07:44

## 2021-08-19 RX ADMIN — Medication 0.2 MCG/KG/MIN: at 12:30

## 2021-08-19 RX ADMIN — GABAPENTIN 600 MG: 300 CAPSULE ORAL at 13:38

## 2021-08-19 RX ADMIN — EPINEPHRINE 20 MCG: 0.1 INJECTION, SOLUTION ENDOTRACHEAL; INTRACARDIAC; INTRAVENOUS at 10:39

## 2021-08-19 RX ADMIN — PANTOPRAZOLE SODIUM 40 MG: 40 TABLET, DELAYED RELEASE ORAL at 04:42

## 2021-08-19 RX ADMIN — FENTANYL CITRATE 100 MCG: 50 INJECTION, SOLUTION INTRAMUSCULAR; INTRAVENOUS at 07:54

## 2021-08-19 RX ADMIN — GLYCOPYRROLATE 0.4 MG: 0.2 INJECTION, SOLUTION INTRAMUSCULAR; INTRAVENOUS at 12:00

## 2021-08-19 RX ADMIN — CHLORHEXIDINE GLUCONATE 10 ML: 1.2 SOLUTION ORAL at 04:42

## 2021-08-19 RX ADMIN — HEPARIN SODIUM 24000 UNITS: 1000 INJECTION INTRAVENOUS; SUBCUTANEOUS at 08:47

## 2021-08-19 RX ADMIN — PROPOFOL 50 MCG/KG/MIN: 10 INJECTION, EMULSION INTRAVENOUS at 11:22

## 2021-08-19 RX ADMIN — CEFAZOLIN SODIUM 2 G: 2 INJECTION, SOLUTION INTRAVENOUS at 08:15

## 2021-08-19 RX ADMIN — OXYCODONE HYDROCHLORIDE 5 MG: 5 TABLET ORAL at 13:39

## 2021-08-19 RX ADMIN — FENTANYL CITRATE 100 MCG: 50 INJECTION, SOLUTION INTRAMUSCULAR; INTRAVENOUS at 08:45

## 2021-08-19 RX ADMIN — FENTANYL CITRATE 150 MCG: 50 INJECTION, SOLUTION INTRAMUSCULAR; INTRAVENOUS at 07:44

## 2021-08-19 RX ADMIN — ACETAMINOPHEN 975 MG: 325 TABLET, FILM COATED ORAL at 21:53

## 2021-08-19 RX ADMIN — EPINEPHRINE 10 MCG: 0.1 INJECTION, SOLUTION ENDOTRACHEAL; INTRACARDIAC; INTRAVENOUS at 10:40

## 2021-08-19 RX ADMIN — HYDROMORPHONE HYDROCHLORIDE 0.2 MG: 0.2 INJECTION, SOLUTION INTRAMUSCULAR; INTRAVENOUS; SUBCUTANEOUS at 13:39

## 2021-08-19 RX ADMIN — GABAPENTIN 600 MG: 300 CAPSULE ORAL at 04:39

## 2021-08-19 RX ADMIN — NITROGLYCERIN 10 MCG/KG/MIN: 20 INJECTION INTRAVENOUS at 08:46

## 2021-08-19 RX ADMIN — MIDAZOLAM HYDROCHLORIDE 2 MG: 1 INJECTION, SOLUTION INTRAMUSCULAR; INTRAVENOUS at 08:46

## 2021-08-19 RX ADMIN — FENTANYL CITRATE 100 MCG: 50 INJECTION, SOLUTION INTRAMUSCULAR; INTRAVENOUS at 10:51

## 2021-08-19 RX ADMIN — PHENYLEPHRINE HYDROCHLORIDE 50 MCG: 10 INJECTION INTRAVENOUS at 09:09

## 2021-08-19 RX ADMIN — SENNOSIDES AND DOCUSATE SODIUM 1 TABLET: 8.6; 5 TABLET ORAL at 20:13

## 2021-08-19 RX ADMIN — FENTANYL CITRATE 50 MCG: 50 INJECTION, SOLUTION INTRAMUSCULAR; INTRAVENOUS at 08:42

## 2021-08-19 RX ADMIN — ETOMIDATE 12 MG: 2 INJECTION INTRAVENOUS at 07:44

## 2021-08-19 RX ADMIN — FAMOTIDINE 20 MG: 20 TABLET ORAL at 04:41

## 2021-08-19 RX ADMIN — CEFAZOLIN 1 G: 1 INJECTION, POWDER, FOR SOLUTION INTRAMUSCULAR; INTRAVENOUS at 23:58

## 2021-08-19 RX ADMIN — VECURONIUM BROMIDE 3 MG: 1 INJECTION, POWDER, LYOPHILIZED, FOR SOLUTION INTRAVENOUS at 10:13

## 2021-08-19 RX ADMIN — POTASSIUM CHLORIDE, DEXTROSE MONOHYDRATE AND SODIUM CHLORIDE 30 ML/HR: 150; 5; 450 INJECTION, SOLUTION INTRAVENOUS at 12:59

## 2021-08-19 RX ADMIN — SODIUM CHLORIDE 1 UNITS/HR: 9 INJECTION, SOLUTION INTRAVENOUS at 13:56

## 2021-08-19 RX ADMIN — OXYCODONE HYDROCHLORIDE 5 MG: 5 TABLET ORAL at 19:15

## 2021-08-19 RX ADMIN — NEOSTIGMINE METHYLSULFATE 3 MG: 1 INJECTION, SOLUTION INTRAVENOUS at 12:00

## 2021-08-19 RX ADMIN — CEFAZOLIN SODIUM 2 G: 2 INJECTION, SOLUTION INTRAVENOUS at 07:56

## 2021-08-19 RX ADMIN — AMIODARONE HYDROCHLORIDE 1 MG/MIN: 50 INJECTION, SOLUTION INTRAVENOUS at 10:50

## 2021-08-19 RX ADMIN — ROCURONIUM BROMIDE 30 MG: 10 INJECTION INTRAVENOUS at 08:49

## 2021-08-19 RX ADMIN — SODIUM CHLORIDE, POTASSIUM CHLORIDE, SODIUM LACTATE AND CALCIUM CHLORIDE: 600; 310; 30; 20 INJECTION, SOLUTION INTRAVENOUS at 07:53

## 2021-08-19 RX ADMIN — CEFAZOLIN 1 G: 1 INJECTION, POWDER, FOR SOLUTION INTRAMUSCULAR; INTRAVENOUS at 16:10

## 2021-08-19 RX ADMIN — EPINEPHRINE 0.03 MCG/KG/MIN: 1 INJECTION INTRAMUSCULAR; INTRAVENOUS; SUBCUTANEOUS at 10:20

## 2021-08-19 RX ADMIN — VANCOMYCIN HYDROCHLORIDE 1000 MG: 1 INJECTION, SOLUTION INTRAVENOUS at 07:42

## 2021-08-19 RX ADMIN — VANCOMYCIN HYDROCHLORIDE 1000 MG: 1 INJECTION, SOLUTION INTRAVENOUS at 20:13

## 2021-08-19 RX ADMIN — MIDAZOLAM HYDROCHLORIDE 2 MG: 1 INJECTION, SOLUTION INTRAMUSCULAR; INTRAVENOUS at 06:54

## 2021-08-19 RX ADMIN — MIDAZOLAM HYDROCHLORIDE 2 MG: 1 INJECTION, SOLUTION INTRAMUSCULAR; INTRAVENOUS at 10:42

## 2021-08-19 RX ADMIN — SODIUM CHLORIDE: 9 INJECTION, SOLUTION INTRAVENOUS at 14:04

## 2021-08-19 RX ADMIN — MIDAZOLAM HYDROCHLORIDE 2 MG: 1 INJECTION, SOLUTION INTRAMUSCULAR; INTRAVENOUS at 07:42

## 2021-08-19 RX ADMIN — MIDAZOLAM HYDROCHLORIDE 2 MG: 1 INJECTION, SOLUTION INTRAMUSCULAR; INTRAVENOUS at 10:13

## 2021-08-19 RX ADMIN — PHENYLEPHRINE HYDROCHLORIDE 100 MCG: 10 INJECTION INTRAVENOUS at 08:03

## 2021-08-19 RX ADMIN — ACETAMINOPHEN 975 MG: 325 TABLET, FILM COATED ORAL at 13:39

## 2021-08-19 RX ADMIN — FENTANYL CITRATE 50 MCG: 50 INJECTION, SOLUTION INTRAMUSCULAR; INTRAVENOUS at 11:57

## 2021-08-19 RX ADMIN — PHENYLEPHRINE HYDROCHLORIDE 100 MCG: 10 INJECTION INTRAVENOUS at 09:01

## 2021-08-19 RX ADMIN — SODIUM CHLORIDE, POTASSIUM CHLORIDE, SODIUM LACTATE AND CALCIUM CHLORIDE: 600; 310; 30; 20 INJECTION, SOLUTION INTRAVENOUS at 06:59

## 2021-08-19 RX ADMIN — METOPROLOL TARTRATE 12.5 MG: 25 TABLET, FILM COATED ORAL at 04:39

## 2021-08-19 RX ADMIN — PHENYLEPHRINE HYDROCHLORIDE 0.25 MCG/KG/MIN: 10 INJECTION INTRAVENOUS at 08:14

## 2021-08-19 RX ADMIN — PROTAMINE SULFATE 160 MG: 10 INJECTION, SOLUTION INTRAVENOUS at 10:47

## 2021-08-19 RX ADMIN — AMINOCAPROIC ACID 5 G/HR: 250 INJECTION, SOLUTION INTRAVENOUS at 08:14

## 2021-08-19 RX ADMIN — SODIUM CHLORIDE, POTASSIUM CHLORIDE, SODIUM LACTATE AND CALCIUM CHLORIDE: 600; 310; 30; 20 INJECTION, SOLUTION INTRAVENOUS at 08:09

## 2021-08-19 ASSESSMENT — ACTIVITIES OF DAILY LIVING (ADL)
ADLS_ACUITY_SCORE: 14
ADLS_ACUITY_SCORE: 17

## 2021-08-19 NOTE — ANESTHESIA CARE TRANSFER NOTE
Patient: Mayda Lezama    Procedure(s):  AORTIC VALVE REPLACEMENT WITH MECHANICAL AORTIC HEART VALVE [facility]   SIZE: 23MM, AND ON CARDIOPULMONARY PUMP OXYGENATOR  (INTRAOPERATIVE TRANSESOPHAGEAL ECHOCARDIOGRAM BY ANESTHESIOLOGIST)       Diagnosis: Aortic stenosis [I35.0]  Diagnosis Additional Information: No value filed.    Anesthesia Type:   General     Note:    Oropharynx: endotracheal tube in place  Level of Consciousness: iatrogenic sedation    Level of Supplemental Oxygen (L/min / FiO2): 15 (ETT)  Independent Airway: airway patency satisfactory and stable  Dentition: dentition unchanged  Vital Signs Stable: post-procedure vital signs reviewed and stable  Report to RN Given: handoff report given  Patient transferred to: ICU    ICU Handoff: Call for PAUSE to initiate/utilize ICU HANDOFF, Identified Patient, Identified Responsible Provider, Reviewed the Pertinent Medical History, Discussed Surgical Course, Reviewed Intra-OP Anesthesia Management and Issues during Anesthesia, Set Expectations for Post Procedure Period and Allowed Opportunity for Questions and Acknowledgement of Understanding      Vitals:  Vitals Value Taken Time   BP     Temp     Pulse 96 08/19/21 1203   Resp 13 08/19/21 1203   SpO2 98 % 08/19/21 1203   Vitals shown include unvalidated device data.    Electronically Signed By: ABRIL Jalloh CRNA  August 19, 2021  12:03 PM

## 2021-08-19 NOTE — PLAN OF CARE
Neuro- A/Ox4  Tele/Cardiac- SR  Resp- WDL  Activity- IND  Pain- Denies  Drips- none  Drains/Tubes- 2 PIV  Skin- WDL  GI/- WDL  Aggression Color- green  COVID status- negative  Plan- Aortic valve replacement today at 0720

## 2021-08-19 NOTE — PROGRESS NOTES
Extubation Note    Successful completion of SBT (Yes or No): Yes  Extubation time:1450    Patient assessment:  Lung sounds:Clear  Stridor Present (Yes or No): No  Patient tolerance: Well tolerated    Oxygen device:  Liter flow:10  SpO2:96%    Plan:Continue to monitor overnight.

## 2021-08-19 NOTE — PROGRESS NOTES
Hospitalist follow up  Surgery note reviewed  intensivist not reviewed. Case discussed. Our service not need to see today.   Will  care of patient tomorrow. Pt extubated.   Romeo Watkins MD

## 2021-08-19 NOTE — CONSULTS
CARDIAC SURGERY NUTRITION CONSULT    Received standing order to assess and educate patient    Patient inappropriate for instructions at this time:    Will follow and complete assessment once patient is extubated and/or transferred to medical unit    Deborah Hunt RD, LD  TPN RD Pager: [phone]  ICU RD Pager: [phone]

## 2021-08-19 NOTE — OP NOTE
Procedure Date: 08/19/2021    REFERRING CARDIOLOGIST:  Jen Amor MD    PREOPERATIVE DIAGNOSES:  Critical aortic stenosis, bicuspid aortic valve.    POSTOPERATIVE DIAGNOSES:  Critical aortic stenosis, bicuspid aortic valve.    SURGEON:  Stephanie Montaño MD    ASSISTANT:  Rajesh Espinal MD    NAME OF OPERATION:  Aortic valve replacement with a 23 mm St. Goldy [place] mechanical valve, intraoperative DEVAUGHN.    ANESTHESIA:  General endotracheal.    INDICATIONS FOR PROCEDURE:  Ms. Lezama is a very pleasant 55-year-old otherwise healthy female with a known bicuspid valve and aortic stenosis, who was admitted to the hospital several days ago with congestive heart failure in acute respiratory failure requiring mechanical ventilator support.  She was extubated a couple of days ago.  Preoperative coronary angiogram demonstrated no significant coronary artery disease.  She was taken to the operating room today for aortic valve replacement with a mechanical valve.    OPERATIVE FINDINGS:  The patient had an overall LVEF around 30%.  Her aortic root and ascending aorta was thin-walled, consistent with a bicuspid aortopathy, but there was no aneurysm.  Her aortic valve was severely stenotic and it was bicuspid Fritz type 0.  She had at least moderate aortic valve insufficiency as well.    DESCRIPTION OF PROCEDURE:  After informed consent was obtained, the patient was brought down to the operating room and placed on the OR table in supine position.  Intravenous and intra-arterial lines were begun.  While monitoring her blood pressure and EKG tracing, she was anesthetized and intubated using a single lumen endotracheal tube.  Her entire chest, abdomen, both groins and legs were prepped down to the toes using multiple layers of DuraPrep.  She was draped in a sterile field.  A median sternotomy was performed and the sternal edges were retracted laterally.  The pericardium was opened to suspended the heart in a pericardial cradle.   The patient was fully heparinized.  The ascending aorta and the right atrial appendage were cannulated.  A retrograde cardioplegia catheter was placed into the coronary sinus without difficulty.  An antegrade needle/aortic root vent was placed in the ascending aorta as well.  After appropriate ACT level was achieved, cardiopulmonary bypass was established.  The patient was kept normothermic during the entire operation.  Carbon dioxide was flooded into the chest to prevent air embolism.  The aorta was then crossclamped and antegrade cold blood cardioplegia was given to fully arrest the heart.  The patient went into good diastolic arrest without any LV distention.  Following this, intermittent retrograde cardioplegia dose was given on average of 15 minutes for myocardial protection while the aorta was crossclamped.    A transverse aortotomy was made and the aortic valve was exposed.  Findings are noted above.  The leaflets were excised and the annulus was meticulously debrided and irrigated out.  The annulus was sized to a 23 mm [employer] Goldy mechanical valve.  Annular sutures were placed using horizontal mattress sutures of 2-0 Ethibond with subannular pledgets.  The valve was brought to the field and all sutures were brought through the sewing ring and the valve seated down without difficulty.  All sutures were tied down securely using the Cor-Knot device.  Both mechanical valve leaflets were tested and they opened and closed freely.  Both coronary ostia were widely patent.  The aortotomy was then closed in 2 layers with running 4-0 Prolene.  Retrograde hot shot was given and the aortic crossclamp was removed.  Aortic cross-clamp time was 64 minutes.  The ascending aorta and the left ventricle were continuously vented to deair the heart.  The patient was then weaned off cardiopulmonary bypass with epinephrine and Erich-Synephrine drips.  Total cardiopulmonary bypass time was 94 minutes.  The valve was seated down well  with no paravalvular leak.  The heart was adequately deaired.  LV function was slightly better than her baseline.  Once the patient remained stable off bypass, the venous cannula was removed and protamine was given.  The aortic cannula was subsequently removed as well.  Temporary ventricular pacer wires placed in the RV muscle.  A 32-Japanese angled and straight chest tubes were placed in mediastinum as well.  These were all brought out percutaneously below the sternotomy incision and secured to the skin using 2-0 Ethibond.  Both pleural spaces are slightly opened.  The mediastinum was irrigated with antibiotic saline and hemostasis was achieved.  The sternum was reapproximated using multiple single interrupted wires.  The incision was closed in layers of running Vicryl suture.  The skin was closed using 3-0 Vicryl and sealed using Dermabond.    There were no intraoperative complications and the patient tolerated the operation well.  No blood products were given intraoperatively.  All sponge counts, needle counts and instrument counts were correct x 2 at the end of the operation.    ESTIMATED BLOOD LOSS:  Unknown.    SPECIMEN REMOVED:  Aortic valve leaflets.    The patient was brought to the ICU in hemodynamically stable condition and remained intubated.    Stephanie Montaño MD        D: 2021   T: 2021   MT: [id number]    Name:     MIKHAIL ESPINAL  MRN:      [id number]        Account:        [id number]   :      1965           Procedure Date: 2021     Document: [id number]

## 2021-08-19 NOTE — ANESTHESIA POSTPROCEDURE EVALUATION
Patient: Mayda Lezama    Procedure(s):  AORTIC VALVE REPLACEMENT WITH MECHANICAL AORTIC HEART VALVE [facility]   SIZE: 23MM, AND ON CARDIOPULMONARY PUMP OXYGENATOR  (INTRAOPERATIVE TRANSESOPHAGEAL ECHOCARDIOGRAM BY ANESTHESIOLOGIST)       Diagnosis:Aortic stenosis [I35.0]  Diagnosis Additional Information: No value filed.    Anesthesia Type:  General    Note:  Disposition: ICU            ICU Sign Out: Anesthesiologist/ICU physician sign out WAS performed   Postop Pain Control: Uneventful            Sign Out: intubated and sedated.   PONV: No   Neuro/Psych: Uneventful            Sign Out: PLANNED postop sedation   Airway/Respiratory: Uneventful            Sign Out: AIRWAY IN SITU/Resp. Support               Airway in situ/Resp. Support: ETT                 Reason: Planned Pre-op   CV/Hemodynamics: Uneventful            Sign Out: Acceptable CV status; No obvious hypovolemia; No obvious fluid overload   Other NRE: NONE   DID A NON-ROUTINE EVENT OCCUR? No           Last vitals:  Vitals Value Taken Time   /54 08/19/21 1203   Temp 36.7  C (98.06  F) 08/19/21 1328   Pulse 72 08/19/21 1329   Resp 10 08/19/21 1329   SpO2 99 % 08/19/21 1329   Vitals shown include unvalidated device data.    Electronically Signed By: Petar Albarado DO  August 19, 2021  1:29 PM

## 2021-08-19 NOTE — PROGRESS NOTES
"Intensivist note  Patient known to ICU service from prior admission.    She underwent open heart surgery today with replacement of AV (23mm [employer] [place] mechanical valve).    By report, her post-op EF about 30%.   She did not receive exogenous blood products and not bleeding post-op.  Her ventricle appeared irritable after CPB with recurrent vfib requiring multiple shocks and now on IV amiodarone for the next 24 hours.   Her aorta was felt to be somewhat \"thin\" and we will titrate meds to keep SBP less than 120.     She was on mechanical ventilation after return from OR and has since been titrated off and extubated. We will focus on pulmonary hygiene.     Lungs were clear; heart sounds distant but normal with mechanical AV sound; abdomen benign; routine post-op changes noted; extremities are warm with minimal edema and no cyanosis or mottling     Other issues:  -she has a history of alcohol abuse and will monitor closely for possible withdrawal syndromes.   - creatinine 0.74 and will monitor only    Overall acute and critical. I spent 35 minutes of critical care time with this patient.     dl blackburn  August 19, 2021    "

## 2021-08-19 NOTE — PROVIDER NOTIFICATION
MD Notification    Notified Person: MD      Notified Person Name: Dr. Sanchez    Notification Date/Time: 8/18/21 2116    Notification Interaction: amcom    Purpose of Notification: Pt to receive Thiamine B-1 for etoh. Currently ordered for daily and three times a day. Pharmacy recommended an order change. Please advise.     Orders Received: discontinued the once daily dose

## 2021-08-19 NOTE — ANESTHESIA PROCEDURE NOTES
Airway       Patient location during procedure: OR       Procedure Start/Stop Times: 8/19/2021 7:52 AM  Staff -        Anesthesiologist:  Petar Albarado DO       CRNA: Agnes Lam APRN CRNA       Other Anesthesia Staff: Francia Birmingham       Performed By: SRNA  Consent for Airway        Urgency: elective  Indications and Patient Condition       Indications for airway management: jim-procedural       Induction type:intravenous       Mask difficulty assessment: 1 - vent by mask    Final Airway Details       Final airway type: endotracheal airway       Successful airway: ETT - single and Single subglottic suction  Endotracheal Airway Details        ETT size (mm): 7.0       Cuffed: yes       Successful intubation technique: direct laryngoscopy       DL Blade Type: Graham 2       Grade View of Cords: 1       Adjucts: stylet       Position: Right       Measured from: gums/teeth       Secured at (cm): 21       Bite block used: None    Post intubation assessment        Placement verified by: capnometry, equal breath sounds and chest rise        Number of attempts at approach: 1       Secured with: pink tape       Ease of procedure: easy       Dentition: Intact and Unchanged

## 2021-08-19 NOTE — ANESTHESIA PROCEDURE NOTES
Arterial Line Procedure Note  Pre-Procedure   Staff -        Anesthesiologist:  Petar Albarado DO       Performed By: anesthesiologist       Location: pre-op       Pre-Anesthestic Checklist: patient identified, IV checked, site marked, risks and benefits discussed, informed consent, monitors and equipment checked, pre-op evaluation and at physician/surgeon's request  Timeout:       Correct Patient: Yes        Correct Procedure: Yes        Correct Site: Yes        Correct Position: Yes   Procedure   Procedure: arterial line       Laterality: right       Insertion Site: radial.  Sterile Prep        All elements of maximal sterile barrier technique followed       Patient Prep/Sterile Barriers: hand hygiene, sterile gloves, hat, mask, draped, sterile gown, draped       Skin prep: Chloraprep  Insertion/Injection        Technique: Seldinger Technique and ultrasound guided        1. Ultrasound was used to evaluate the access site.       2. Artery evaluated via ultrasound for patency/adequacy.       3. Using real-time ultrasound the needle/catheter was observed entering the artery/vein.       4. Permanent image was captured and entered into the patient's record.       5. The visualized structures were anatomically normal.       6. There were no apparent abnormal pathologic findings.       Catheter Type/Size: 20 gauge, 1.75 in/4.5 cm quick cath (integral wire)  Narrative        Tegaderm dressing used.       Arterial waveform: Yes        IBP within 10% of NIBP: Yes

## 2021-08-19 NOTE — BRIEF OP NOTE
[facility]    Brief Operative Note    Pre-operative diagnosis: Aortic stenosis [I35.0]   Post-operative diagnosis Same as pre-operative diagnosis    Procedure: Procedure(s):  AORTIC VALVE REPLACEMENT WITH MECHANICAL AORTIC HEART VALVE [facility]   SIZE: 23MM, AND ON CARDIOPULMONARY PUMP OXYGENATOR  (INTRAOPERATIVE TRANSESOPHAGEAL ECHOCARDIOGRAM BY ANESTHESIOLOGIST)     Surgeon: Surgeon(s) and Role:     * Stephanie Montaño MD - Primary      * Temo Espinal MD (Cardiothoracic Surgery Fellow)- Assisting   Anesthesia: General   Estimated blood loss: 500 ml  Drains: Chest tubes  X 2 med  Specimens:   ID Type Source Tests Collected by Time Destination   1 : AORTIC VALVE Tissue Heart Valve, Aortic SURGICAL PATHOLOGY EXAM Stephanie Montaño MD 8/19/2021 10:02 AM    A : HEART SURGERY (POST PUMP)  Blood Line, arterial CBC WITH PLATELETS, BASIC METABOLIC PANEL, FIBRINOGEN ACTIVITY, PARTIAL THROMBOPLASTIN TIME, INR Agnes Lam APRN CRNA 8/19/2021 11:00 AM      Findings:   see op nopte.  Complications: None.  Implants:   Implant Name Type Inv. Item Serial No.  Lot No. LRB No. Used Action   VALVE AORTIC REGENT FLEX-CUFF 23MM [id number] - [id number] Valve VALVE AORTIC REGENT FLEX-CUFF 23MM [id number] [employer]  N/A 1 Implanted   DEVICE [employer] KNOT QUICK LOAD [id number] - [id number] Wire DEVICE [employer] KNOT QUICK LOAD [id number]  [employer] [id number] N/A 2 Implanted   IMP KIT SUTURE COR-KNOT MINI 4X14MM [id number] - [id number] Metallic Hardware/[place] IMP KIT SUTURE COR-KNOT MINI 4X14MM [id number]  [employer] [id number] N/A 1 Implanted

## 2021-08-19 NOTE — ANESTHESIA PROCEDURE NOTES
Central Line/PA Catheter Placement  Pre-Procedure   Staff -        Anesthesiologist:  Petar Albarado DO       Performed By: anesthesiologist       Location: pre-op       Pre-Anesthestic Checklist: patient identified, IV checked, site marked, risks and benefits discussed, informed consent, monitors and equipment checked, pre-op evaluation and at physician/surgeon's request  Timeout:       Correct Patient: Yes        Correct Procedure: Yes        Correct Site: Yes        Correct Position: Yes        Correct Laterality: Yes     Procedure   Procedure: central line       Insertion Site: right, internal jugular.       Patient Position: Trendelenburg  Sterile Prep        Patient Prep/Sterile Barriers: sterile gel and probe cover       Skin prep: Chloraprep  Insertion/Injection        Local skin infiltrated with 1 mL of 1% lidocaine.        Technique: ultrasound guided        1. Ultrasound was used to evaluate the access site.       2. Vein evaluated via ultrasound for patency/adequacy.       3. Using real-time ultrasound the needle/catheter was observed entering the artery/vein.       4. Permanent image was captured and entered into the patient's record.       5. The visualized structures were anatomically normal.       6. There were no apparent abnormal pathologic findings.       Catheter Type/Size: 9 Azeri, 10 cm (sheath introducer)  Narrative         Secured by: suture       Tegaderm and Biopatch dressing used.       blood aspirated from all lumens,        All lumens flushed: Yes  Comments:  Patient intubated and sedated in the OR. Sterile prep and drape. The right internal jugular vein was accessed under ultrasound guidance with an 18 gauge thin wall needle, a 1.75 inch catheter was advanced over the needle and the needle was removed. A wire was then advanced through the catheter and catheter was then removed. The J wire was visible in the mid esophageal bi-caval view. Image acquired. A 9 Maltese cordis was  advanced over the wire via the seldinger technique. Blood was withdrawn from all lumens and flushed with normal saline.     Attention was then turned to placement of a triple lumen Levant-Kendy catheter. All ports were flushed and balloon inflation confirmed prior to insertion into the cordis port. The catheter was introduced and advanced to 20 cm. The balloon was then inflated and the catheter was advanced through the right ventricle and into the pulmonary artery until a wedge position pressure tracing was obtained. One first attempt, v tach arrhythmia noted. Balloon deflated. Self-limited without intervention. Attempt two successful and PAC visible in main PA on upper esophageal aortic short axis view. The balloon was then deflated and verification of return of pulmonary artery pressure tracing was made. The catheter was then sutured in place and a sterile dressing was applied over the site prior to removal of drapes. This was atraumatic and there were no obvious complications.

## 2021-08-19 NOTE — PLAN OF CARE
Meeting pathway goals.  Remains on low dose Erich and Epi to maintain MAP > 65 mmHg. CO/CI 4/2.5 SR 60-70's with no ectopy.  SBP to remain below 120 mmHg.  Extubated at 1450 to nasal cannula.  Chest tube output ~ 360 ml's.  Dangled and sat in chair for over an hour.  A/O x 4 however, mildly sedated and forgetful when waking from sleep.  Orients to time, place and situation right away. Tolerating pain with IV dilaudid, oxycodone, gabapentin and tylenol.  Potassium replaced.   at bedside and updated with POC.

## 2021-08-20 ENCOUNTER — APPOINTMENT (OUTPATIENT)
Dept: GENERAL RADIOLOGY | Facility: CLINIC | Age: 56
End: 2021-08-20
Attending: STUDENT IN AN ORGANIZED HEALTH CARE EDUCATION/TRAINING PROGRAM
Payer: COMMERCIAL

## 2021-08-20 ENCOUNTER — APPOINTMENT (OUTPATIENT)
Dept: PHYSICAL THERAPY | Facility: CLINIC | Age: 56
End: 2021-08-20
Attending: STUDENT IN AN ORGANIZED HEALTH CARE EDUCATION/TRAINING PROGRAM
Payer: COMMERCIAL

## 2021-08-20 LAB
ALBUMIN SERPL-MCNC: 2.9 G/DL (ref 3.4–5)
ALP SERPL-CCNC: 59 U/L (ref 40–150)
ALT SERPL W P-5'-P-CCNC: 37 U/L (ref 0–50)
ANION GAP SERPL CALCULATED.3IONS-SCNC: 5 MMOL/L (ref 3–14)
AST SERPL W P-5'-P-CCNC: 65 U/L (ref 0–45)
BACTERIA BLD CULT: NO GROWTH
BACTERIA BLD CULT: NO GROWTH
BASE EXCESS BLDA CALC-SCNC: 0.9 MMOL/L (ref -9.6–2)
BASE EXCESS BLDA CALC-SCNC: 1.3 MMOL/L (ref -9.6–2)
BASE EXCESS BLDA CALC-SCNC: 1.6 MMOL/L (ref -9.6–2)
BASE EXCESS BLDA CALC-SCNC: 2 MMOL/L (ref -9.6–2)
BASE EXCESS BLDA CALC-SCNC: 3.5 MMOL/L (ref -9.6–2)
BASE EXCESS BLDA CALC-SCNC: 3.7 MMOL/L (ref -9.6–2)
BASE EXCESS BLDV CALC-SCNC: 4.5 MMOL/L (ref -8.1–1.9)
BILIRUB SERPL-MCNC: 0.9 MG/DL (ref 0.2–1.3)
BUN SERPL-MCNC: 12 MG/DL (ref 7–30)
CA-I BLD-MCNC: 3.6 MG/DL (ref 4.4–5.2)
CA-I BLD-MCNC: 3.9 MG/DL (ref 4.4–5.2)
CA-I BLD-MCNC: 4 MG/DL (ref 4.4–5.2)
CA-I BLD-MCNC: 4.4 MG/DL (ref 4.4–5.2)
CA-I BLD-MCNC: 4.5 MG/DL (ref 4.4–5.2)
CA-I BLD-MCNC: 4.7 MG/DL (ref 4.4–5.2)
CA-I BLD-MCNC: 5 MG/DL (ref 4.4–5.2)
CA-I BLD-MCNC: 5.5 MG/DL (ref 4.4–5.2)
CALCIUM SERPL-MCNC: 7.9 MG/DL (ref 8.5–10.1)
CHLORIDE BLD-SCNC: 105 MMOL/L (ref 94–109)
CO2 SERPL-SCNC: 23 MMOL/L (ref 20–32)
CREAT SERPL-MCNC: 0.67 MG/DL (ref 0.52–1.04)
ERYTHROCYTE [DISTWIDTH] IN BLOOD BY AUTOMATED COUNT: 12.9 % (ref 10–15)
GFR SERPL CREATININE-BSD FRML MDRD: >90 ML/MIN/1.73M2
GLUCOSE BLD-MCNC: 116 MG/DL (ref 70–99)
GLUCOSE BLD-MCNC: 118 MG/DL (ref 70–99)
GLUCOSE BLD-MCNC: 131 MG/DL (ref 70–99)
GLUCOSE BLD-MCNC: 140 MG/DL (ref 70–99)
GLUCOSE BLD-MCNC: 149 MG/DL (ref 70–99)
GLUCOSE BLD-MCNC: 154 MG/DL (ref 70–99)
GLUCOSE BLD-MCNC: 172 MG/DL (ref 70–99)
GLUCOSE BLD-MCNC: 231 MG/DL (ref 70–99)
GLUCOSE BLDC GLUCOMTR-MCNC: 109 MG/DL (ref 70–99)
GLUCOSE BLDC GLUCOMTR-MCNC: 124 MG/DL (ref 70–99)
GLUCOSE BLDC GLUCOMTR-MCNC: 124 MG/DL (ref 70–99)
GLUCOSE BLDC GLUCOMTR-MCNC: 127 MG/DL (ref 70–99)
GLUCOSE BLDC GLUCOMTR-MCNC: 130 MG/DL (ref 70–99)
GLUCOSE BLDC GLUCOMTR-MCNC: 132 MG/DL (ref 70–99)
GLUCOSE BLDC GLUCOMTR-MCNC: 137 MG/DL (ref 70–99)
GLUCOSE BLDC GLUCOMTR-MCNC: 95 MG/DL (ref 70–99)
HCO3 BLDA-SCNC: 26 MMOL/L (ref 21–28)
HCO3 BLDA-SCNC: 27 MMOL/L (ref 21–28)
HCO3 BLDA-SCNC: 28 MMOL/L (ref 21–28)
HCO3 BLDA-SCNC: 28 MMOL/L (ref 21–28)
HCO3 BLDV-SCNC: 29 MMOL/L (ref 21–28)
HCT VFR BLD AUTO: 30.5 % (ref 35–47)
HGB BLD-MCNC: 10.1 G/DL (ref 11.7–15.7)
HGB BLD-MCNC: 10.2 G/DL (ref 11.7–15.7)
HGB BLD-MCNC: 13.2 G/DL (ref 11.7–15.7)
HGB BLD-MCNC: 14 G/DL (ref 11.7–15.7)
HGB BLD-MCNC: 8.8 G/DL (ref 11.7–15.7)
HGB BLD-MCNC: 8.9 G/DL (ref 11.7–15.7)
HGB BLD-MCNC: 9.7 G/DL (ref 11.7–15.7)
HGB BLD-MCNC: 9.9 G/DL (ref 11.7–15.7)
INR PPP: 1.29 (ref 0.85–1.15)
LACTATE BLD-SCNC: 0.5 MMOL/L
LACTATE BLD-SCNC: 0.6 MMOL/L
LACTATE BLD-SCNC: 0.7 MMOL/L
LACTATE BLD-SCNC: 0.9 MMOL/L
LACTATE BLD-SCNC: 1.1 MMOL/L
LACTATE SERPL-SCNC: 0.8 MMOL/L (ref 0.7–2)
MAGNESIUM SERPL-MCNC: 2.3 MG/DL (ref 1.6–2.3)
MCH RBC QN AUTO: 32.8 PG (ref 26.5–33)
MCHC RBC AUTO-ENTMCNC: 33.4 G/DL (ref 31.5–36.5)
MCV RBC AUTO: 98 FL (ref 78–100)
O2/TOTAL GAS SETTING VFR VENT: 100 %
O2/TOTAL GAS SETTING VFR VENT: 100 %
O2/TOTAL GAS SETTING VFR VENT: 60 %
O2/TOTAL GAS SETTING VFR VENT: 80 %
PCO2 BLDA: 40 MM HG (ref 35–45)
PCO2 BLDA: 41 MM HG (ref 35–45)
PCO2 BLDA: 41 MM HG (ref 35–45)
PCO2 BLDA: 42 MM HG (ref 35–45)
PCO2 BLDA: 44 MM HG (ref 35–45)
PCO2 BLDA: 44 MM HG (ref 35–45)
PCO2 BLDV: 45 MM HG (ref 40–50)
PH BLDA: 7.39 [PH] (ref 7.35–7.45)
PH BLDA: 7.4 [PH] (ref 7.35–7.45)
PH BLDA: 7.41 [PH] (ref 7.35–7.45)
PH BLDA: 7.42 [PH] (ref 7.35–7.45)
PH BLDA: 7.44 [PH] (ref 7.35–7.45)
PH BLDA: 7.45 [PH] (ref 7.35–7.45)
PH BLDV: 7.43 [PH] (ref 7.32–7.43)
PHOSPHATE SERPL-MCNC: 3.4 MG/DL (ref 2.5–4.5)
PLATELET # BLD AUTO: 203 10E3/UL (ref 150–450)
PO2 BLDA: 160 MM HG (ref 80–105)
PO2 BLDA: 320 MM HG (ref 80–105)
PO2 BLDA: 383 MM HG (ref 80–105)
PO2 BLDA: 424 MM HG (ref 80–105)
PO2 BLDA: 440 MM HG (ref 80–105)
PO2 BLDA: 451 MM HG (ref 80–105)
PO2 BLDV: 41 MM HG (ref 25–47)
POTASSIUM BLD-SCNC: 3.7 MMOL/L (ref 3.5–5)
POTASSIUM BLD-SCNC: 4.1 MMOL/L (ref 3.5–5)
POTASSIUM BLD-SCNC: 4.1 MMOL/L (ref 3.5–5)
POTASSIUM BLD-SCNC: 4.2 MMOL/L (ref 3.5–5)
POTASSIUM BLD-SCNC: 4.5 MMOL/L (ref 3.5–5)
POTASSIUM BLD-SCNC: 4.7 MMOL/L (ref 3.4–5.3)
POTASSIUM BLD-SCNC: 4.7 MMOL/L (ref 3.5–5)
POTASSIUM BLD-SCNC: 5 MMOL/L (ref 3.5–5)
PROT SERPL-MCNC: 5.3 G/DL (ref 6.8–8.8)
RBC # BLD AUTO: 3.11 10E6/UL (ref 3.8–5.2)
SODIUM BLD-SCNC: 137 MMOL/L (ref 133–144)
SODIUM BLD-SCNC: 139 MMOL/L (ref 133–144)
SODIUM BLD-SCNC: 140 MMOL/L (ref 133–144)
SODIUM BLD-SCNC: 141 MMOL/L (ref 133–144)
SODIUM BLD-SCNC: 143 MMOL/L (ref 133–144)
SODIUM SERPL-SCNC: 133 MMOL/L (ref 133–144)
WBC # BLD AUTO: 11.2 10E3/UL (ref 4–11)

## 2021-08-20 PROCEDURE — 83605 ASSAY OF LACTIC ACID: CPT | Performed by: STUDENT IN AN ORGANIZED HEALTH CARE EDUCATION/TRAINING PROGRAM

## 2021-08-20 PROCEDURE — 85027 COMPLETE CBC AUTOMATED: CPT | Performed by: STUDENT IN AN ORGANIZED HEALTH CARE EDUCATION/TRAINING PROGRAM

## 2021-08-20 PROCEDURE — 82040 ASSAY OF SERUM ALBUMIN: CPT | Performed by: STUDENT IN AN ORGANIZED HEALTH CARE EDUCATION/TRAINING PROGRAM

## 2021-08-20 PROCEDURE — 250N000011 HC RX IP 250 OP 636: Performed by: STUDENT IN AN ORGANIZED HEALTH CARE EDUCATION/TRAINING PROGRAM

## 2021-08-20 PROCEDURE — 250N000013 HC RX MED GY IP 250 OP 250 PS 637: Performed by: STUDENT IN AN ORGANIZED HEALTH CARE EDUCATION/TRAINING PROGRAM

## 2021-08-20 PROCEDURE — 250N000013 HC RX MED GY IP 250 OP 250 PS 637: Performed by: PHYSICIAN ASSISTANT

## 2021-08-20 PROCEDURE — 99233 SBSQ HOSP IP/OBS HIGH 50: CPT | Performed by: INTERNAL MEDICINE

## 2021-08-20 PROCEDURE — 250N000013 HC RX MED GY IP 250 OP 250 PS 637: Performed by: INTERNAL MEDICINE

## 2021-08-20 PROCEDURE — 97530 THERAPEUTIC ACTIVITIES: CPT | Mod: GP

## 2021-08-20 PROCEDURE — 97161 PT EVAL LOW COMPLEX 20 MIN: CPT | Mod: GP

## 2021-08-20 PROCEDURE — 84100 ASSAY OF PHOSPHORUS: CPT | Performed by: STUDENT IN AN ORGANIZED HEALTH CARE EDUCATION/TRAINING PROGRAM

## 2021-08-20 PROCEDURE — 71045 X-RAY EXAM CHEST 1 VIEW: CPT

## 2021-08-20 PROCEDURE — 85610 PROTHROMBIN TIME: CPT | Performed by: SURGERY

## 2021-08-20 PROCEDURE — 93005 ELECTROCARDIOGRAM TRACING: CPT

## 2021-08-20 PROCEDURE — 82330 ASSAY OF CALCIUM: CPT | Performed by: STUDENT IN AN ORGANIZED HEALTH CARE EDUCATION/TRAINING PROGRAM

## 2021-08-20 PROCEDURE — 250N000011 HC RX IP 250 OP 636: Performed by: PHYSICIAN ASSISTANT

## 2021-08-20 PROCEDURE — 120N000001 HC R&B MED SURG/OB

## 2021-08-20 PROCEDURE — 83735 ASSAY OF MAGNESIUM: CPT | Performed by: STUDENT IN AN ORGANIZED HEALTH CARE EDUCATION/TRAINING PROGRAM

## 2021-08-20 PROCEDURE — 258N000003 HC RX IP 258 OP 636: Performed by: STUDENT IN AN ORGANIZED HEALTH CARE EDUCATION/TRAINING PROGRAM

## 2021-08-20 RX ORDER — LANOLIN ALCOHOL/MO/W.PET/CERES
100 CREAM (GRAM) TOPICAL 3 TIMES DAILY
Status: DISCONTINUED | OUTPATIENT
Start: 2021-08-20 | End: 2021-08-24 | Stop reason: HOSPADM

## 2021-08-20 RX ORDER — FOLIC ACID 1 MG/1
1 TABLET ORAL DAILY
Status: DISCONTINUED | OUTPATIENT
Start: 2021-08-20 | End: 2021-08-24 | Stop reason: HOSPADM

## 2021-08-20 RX ORDER — METOPROLOL TARTRATE 1 MG/ML
5 INJECTION, SOLUTION INTRAVENOUS EVERY 6 HOURS PRN
Status: DISCONTINUED | OUTPATIENT
Start: 2021-08-20 | End: 2021-08-20

## 2021-08-20 RX ORDER — MULTIPLE VITAMINS W/ MINERALS TAB 9MG-400MCG
1 TAB ORAL DAILY
Status: DISCONTINUED | OUTPATIENT
Start: 2021-08-20 | End: 2021-08-24 | Stop reason: HOSPADM

## 2021-08-20 RX ORDER — FOLIC ACID 5 MG/ML
1 INJECTION, SOLUTION INTRAMUSCULAR; INTRAVENOUS; SUBCUTANEOUS DAILY
Status: DISCONTINUED | OUTPATIENT
Start: 2021-08-21 | End: 2021-08-20

## 2021-08-20 RX ORDER — FOLIC ACID 5 MG/ML
1 INJECTION, SOLUTION INTRAMUSCULAR; INTRAVENOUS; SUBCUTANEOUS ONCE
Status: DISCONTINUED | OUTPATIENT
Start: 2021-08-20 | End: 2021-08-20

## 2021-08-20 RX ORDER — FOLIC ACID 1 MG/1
1 TABLET ORAL DAILY
Status: DISCONTINUED | OUTPATIENT
Start: 2021-08-23 | End: 2021-08-20

## 2021-08-20 RX ORDER — FLUMAZENIL 0.1 MG/ML
0.2 INJECTION, SOLUTION INTRAVENOUS
Status: DISCONTINUED | OUTPATIENT
Start: 2021-08-20 | End: 2021-08-21

## 2021-08-20 RX ORDER — LANOLIN ALCOHOL/MO/W.PET/CERES
100 CREAM (GRAM) TOPICAL DAILY
Status: DISCONTINUED | OUTPATIENT
Start: 2021-08-27 | End: 2021-08-20

## 2021-08-20 RX ORDER — LANOLIN ALCOHOL/MO/W.PET/CERES
100 CREAM (GRAM) TOPICAL 3 TIMES DAILY
Status: DISCONTINUED | OUTPATIENT
Start: 2021-08-22 | End: 2021-08-20

## 2021-08-20 RX ORDER — WARFARIN SODIUM 5 MG/1
5 TABLET ORAL
Status: COMPLETED | OUTPATIENT
Start: 2021-08-20 | End: 2021-08-20

## 2021-08-20 RX ORDER — LORAZEPAM 0.5 MG/1
0.5 TABLET ORAL EVERY 6 HOURS PRN
Status: DISCONTINUED | OUTPATIENT
Start: 2021-08-20 | End: 2021-08-24 | Stop reason: HOSPADM

## 2021-08-20 RX ADMIN — FOLIC ACID 1 MG: 1 TABLET ORAL at 18:05

## 2021-08-20 RX ADMIN — PANTOPRAZOLE SODIUM 40 MG: 40 TABLET, DELAYED RELEASE ORAL at 08:50

## 2021-08-20 RX ADMIN — CEFAZOLIN 1 G: 1 INJECTION, POWDER, FOR SOLUTION INTRAMUSCULAR; INTRAVENOUS at 08:50

## 2021-08-20 RX ADMIN — SENNOSIDES AND DOCUSATE SODIUM 1 TABLET: 8.6; 5 TABLET ORAL at 21:54

## 2021-08-20 RX ADMIN — AMIODARONE HYDROCHLORIDE 1 MG/MIN: 50 INJECTION, SOLUTION INTRAVENOUS at 01:42

## 2021-08-20 RX ADMIN — OXYCODONE HYDROCHLORIDE 5 MG: 5 TABLET ORAL at 19:57

## 2021-08-20 RX ADMIN — WARFARIN SODIUM 5 MG: 5 TABLET ORAL at 18:05

## 2021-08-20 RX ADMIN — ACETAMINOPHEN 975 MG: 325 TABLET, FILM COATED ORAL at 06:04

## 2021-08-20 RX ADMIN — DULOXETINE 20 MG: 20 CAPSULE, DELAYED RELEASE ORAL at 08:50

## 2021-08-20 RX ADMIN — HEPARIN SODIUM 5000 UNITS: 5000 INJECTION INTRAVENOUS; SUBCUTANEOUS at 21:55

## 2021-08-20 RX ADMIN — METHOCARBAMOL 750 MG: 750 TABLET ORAL at 15:44

## 2021-08-20 RX ADMIN — HYDROMORPHONE HYDROCHLORIDE 0.2 MG: 0.2 INJECTION, SOLUTION INTRAMUSCULAR; INTRAVENOUS; SUBCUTANEOUS at 16:12

## 2021-08-20 RX ADMIN — OXYCODONE HYDROCHLORIDE 5 MG: 5 TABLET ORAL at 02:50

## 2021-08-20 RX ADMIN — METHOCARBAMOL 750 MG: 750 TABLET ORAL at 08:50

## 2021-08-20 RX ADMIN — OXYCODONE HYDROCHLORIDE 5 MG: 5 TABLET ORAL at 06:04

## 2021-08-20 RX ADMIN — HYDRALAZINE HYDROCHLORIDE 10 MG: 20 INJECTION INTRAMUSCULAR; INTRAVENOUS at 15:44

## 2021-08-20 RX ADMIN — SENNOSIDES AND DOCUSATE SODIUM 1 TABLET: 8.6; 5 TABLET ORAL at 08:50

## 2021-08-20 RX ADMIN — ACETAMINOPHEN 975 MG: 325 TABLET, FILM COATED ORAL at 13:22

## 2021-08-20 RX ADMIN — HYDROMORPHONE HYDROCHLORIDE 0.2 MG: 0.2 INJECTION, SOLUTION INTRAMUSCULAR; INTRAVENOUS; SUBCUTANEOUS at 08:35

## 2021-08-20 RX ADMIN — LORAZEPAM 0.5 MG: 0.5 TABLET ORAL at 21:54

## 2021-08-20 RX ADMIN — GABAPENTIN 600 MG: 300 CAPSULE ORAL at 19:57

## 2021-08-20 RX ADMIN — GABAPENTIN 600 MG: 300 CAPSULE ORAL at 11:24

## 2021-08-20 RX ADMIN — VANCOMYCIN HYDROCHLORIDE 1000 MG: 1 INJECTION, SOLUTION INTRAVENOUS at 10:39

## 2021-08-20 RX ADMIN — OXYCODONE HYDROCHLORIDE 5 MG: 5 TABLET ORAL at 11:23

## 2021-08-20 RX ADMIN — ASPIRIN 81 MG CHEWABLE TABLET 81 MG: 81 TABLET CHEWABLE at 08:50

## 2021-08-20 RX ADMIN — LORAZEPAM 0.5 MG: 0.5 TABLET ORAL at 13:22

## 2021-08-20 RX ADMIN — HEPARIN SODIUM 5000 UNITS: 5000 INJECTION INTRAVENOUS; SUBCUTANEOUS at 13:22

## 2021-08-20 RX ADMIN — THIAMINE HCL TAB 100 MG 100 MG: 100 TAB at 18:04

## 2021-08-20 RX ADMIN — GABAPENTIN 600 MG: 300 CAPSULE ORAL at 04:34

## 2021-08-20 RX ADMIN — ACETAMINOPHEN 975 MG: 325 TABLET, FILM COATED ORAL at 21:54

## 2021-08-20 RX ADMIN — THIAMINE HCL TAB 100 MG 100 MG: 100 TAB at 21:54

## 2021-08-20 RX ADMIN — MULTIPLE VITAMINS W/ MINERALS TAB 1 TABLET: TAB at 18:04

## 2021-08-20 ASSESSMENT — MIFFLIN-ST. JEOR
SCORE: 1222.88
SCORE: 1216.88

## 2021-08-20 ASSESSMENT — ACTIVITIES OF DAILY LIVING (ADL)
ADLS_ACUITY_SCORE: 17
ADLS_ACUITY_SCORE: 17
ADLS_ACUITY_SCORE: 16
ADLS_ACUITY_SCORE: 17
ADLS_ACUITY_SCORE: 18
ADLS_ACUITY_SCORE: 17

## 2021-08-20 NOTE — CONSULTS
8/20/2021      Attempted to interview pt via [employer] for CD Consult. Per RN report, pt did not want to speak with evaluator at that time. RN reports pt will be transferring to a medical unit. CD Consult can be reordered when pt is on a medical unit if she would like an evaluation and referrals to inpatient CD treatment.     CHELSIE Chairez  Phone: [phone]  Email: [email or web address]

## 2021-08-20 NOTE — PROGRESS NOTES
08/20/21 0900   Quick Adds   Type of Visit PT Re-evaluation   Living Environment   People in home spouse   Current Living Arrangements house   Home Accessibility stairs to enter home;stairs within home   Number of Stairs, Main Entrance 1   Stair Railings, Main Entrance none   Number of Stairs, Within Home, Primary 2   Stair Railings, Within Home, Primary railing on right side (ascending)   Self-Care   Usual Activity Tolerance good   Current Activity Tolerance moderate   Equipment Currently Used at Home none   Disability/Function   Fall history within last six months no   Change in Functional Status Since Onset of Current Illness/Injury yes   General Information   Onset of Illness/Injury or Date of Surgery 08/19/21   Referring Physician Temo Espinal MD   Patient/Family Therapy Goals Statement (PT) Discharge home   Pertinent History of Current Problem (include personal factors and/or comorbidities that impact the POC) Patient admitted on 8/15/21 for evaluation of shortness of breath and palpitations; emergently intubated for ARF, extubated on 8/16. Patient now POD-1 from AVR. Patient with PMH of hypertension, aortic stenosis, and anxiety.    Existing Precautions/Restrictions fall;cardiac   Weight-Bearing Status - LLE full weight-bearing   Weight-Bearing Status - RLE full weight-bearing   Heart Disease Risk Factors High blood pressure   Pain Assessment   Patient Currently in Pain Yes, see Vital Sign flowsheet  (not rated on scale of 0-10)   Integumentary/Edema   Integumentary/Edema Comments Multiple lines and tubes present   Posture    Posture Not impaired   Range of Motion (ROM)   ROM Comment B LE ROM WNL   Strength   Strength Comments Not formally assessed but at least 3+/5 with functional transfers   Bed Mobility   Comment (Bed Mobility) Supine>sit with min A x 2   Transfers   Transfer Safety Comments Sit>stand from EOB with no AD and Min A    Gait/Stairs (Locomotion)   Comment (Gait/Stairs) Able to take a few  steps from bed to chair with no AD and Min A to CGA   Balance   Balance Comments Somewhat unsteady when on feet and taking steps to chair; multiple lines being present and first few times out of bed likely contributing   Clinical Impression   Criteria for Skilled Therapeutic Intervention yes, treatment indicated   PT Diagnosis (PT) Impaired mobility and gait   Influenced by the following impairments pain, weakness, decreased tolerance to activity   Functional limitations due to impairments AFR, POD-1 AVR   Clinical Presentation Stable/Uncomplicated   Clinical Presentation Rationale Based on PMH, current presentation, and social support   Clinical Decision Making (Complexity) low complexity   Therapy Frequency (PT) 2x/day   Predicted Duration of Therapy Intervention (days/wks) 5 days   Planned Therapy Interventions (PT) balance training;bed mobility training;gait training;strengthening;stretching;transfer training;home program guidelines;risk factor education;progressive activity/exercise   Risk & Benefits of therapy have been explained evaluation/treatment results reviewed;care plan/treatment goals reviewed;risks/benefits reviewed;current/potential barriers reviewed;participants voiced agreement with care plan;participants included;patient;spouse/significant other   PT Discharge Planning    PT Discharge Recommendation (DC Rec) home with outpatient physical therapy;home with assist   PT Rationale for DC Rec Limited mobility this date in room, requiring min A x 1-2. Anticipate with further skilled therapy intervention, patient will progress to level of A for safe discharge home with A from spouse for household tasks, OP CR phase II to continue to progress activity tolerance, monitor vitals with activity, and promote overall heart health.    Total Evaluation Time   Total Evaluation Time (Minutes) 8

## 2021-08-20 NOTE — PLAN OF CARE
[facility] Intensive Care Unit   Nursing Note                                                       Neuro:  PERRL.  Moves all extremities.  Follows commands.    Cardiovascular:  RRR.  Hemodynamically stable.  Off pressors.  Pulmonary:  Tolerating NC.  Oxygen saturation > 92  GI/:  Adequate UOP.  Endocrine: Glucose well controlled.  Skin:  Intact except incisional areas.  Protective mepilex applied to sacrum.  Restraints:  Not in use or necessary.  AM labs noted; electrolytes replaced as indicated.  Pathway.  Continue to monitor closely.    Recent Labs   Lab 08/19/21  1227 08/19/21  1225 08/17/21  1019 08/15/21  1834   PH  --  7.36 7.44* 7.30*   PCO2  --  43 34* 43   PO2  --  83  --  87   HCO3  --  24 24 21   O2PER 50 50  --  40       ROUTINE IP LABS (Last four results)  BMP  Recent Labs   Lab 08/20/21  0616 08/20/21  0446 08/20/21  0444 08/20/21  0212 08/19/21  1223 08/19/21  1100 08/19/21  1100 08/19/21  0433   NA  --  133  --   --  140  --  142 140   POTASSIUM  --  4.7  --   --  3.7  3.7  --  4.1 3.9   CHLORIDE  --  105  --   --  111*  --  112* 109   KARLY  --  7.9*  --   --  8.1*  --  8.6 8.7   CO2  --  23  --   --  24  --  26 26   BUN  --  12  --   --  8  --  8 11   CR  --  0.67  --   --  0.74  --  0.76 0.80   * 118* 127* 130* 163*   < > 172* 99    < > = values in this interval not displayed.     CBC  Recent Labs   Lab 08/20/21 0446 08/19/21  1223 08/19/21  1100 08/19/21  0433   WBC 11.2* 10.4 12.1* 7.4   RBC 3.11* 3.38* 2.95* 4.31   HGB 10.2* 11.1* 9.7* 13.9   HCT 30.5* 33.4* 29.0* 41.3   MCV 98 99 98 96   MCH 32.8 32.8 32.9 32.3   MCHC 33.4 33.2 33.4 33.7   RDW 12.9 12.5 12.6 12.5    161 150 238     INR  Recent Labs   Lab 08/19/21  1223 08/19/21  1100   INR 1.28* 1.46*     PTT  Recent Labs   Lab 08/19/21  1223 08/19/21  1100   * 69*     LACTIC ACID  Lactic Acid (mmol/L)   Date Value   08/20/2021 0.8   08/19/2021 1.0   08/16/2021 0.5 (L)   08/15/2021 3.1 (H)     Lactic Acid POCT  (mmol/L)   Date Value   08/17/2021 0.4   08/17/2021 0.4       Intake/Output Summary (Last 24 hours) at 8/20/2021 0729  Last data filed at 8/20/2021 0700  Gross per 24 hour   Intake 3194.13 ml   Output 2325 ml   Net 869.13 ml       Jonathan Tariq MSN RN PHN CCRN  [facility]  Intensive Care Unit

## 2021-08-20 NOTE — PROGRESS NOTES
[facility]  Cardiovascular and Thoracic Surgery Daily Note          Assessment and Plan:   POD#1 s/p Aortic valve replacement with a 23 mm [employer] [place] mechanical valve, intraoperative DEVAUGHN by Dr. Stephanie Montaño  -CVS: HR: 60s-70s. SBP: 100-110s. Pre op EF: 25-30%. Weaned off pressors. ASA, will add BB today if BP allows, no statin as no CAD. PTA losartan on hold, will resume if needed for HTN. Will discontinue amiodarone. NSR. Cap pacer wires, chest tubes to suction. Will plan to start coumadin tonight for mechanical aortic valve with INR goal 2-3.   -Resp: Extubated within protocol. Saturating well on 6L, wean oxygen as able. Continue to encourage IS, cough, deep breathing, ambulation  -Neuro:  Grossly intact. Pain controlled. Hx of anxiety-- PTA cymbalta resumed.  -Renal: good UO, up about 6kg from preoperative weight. Will hold diuretics today and continue to monitor.   Creatinine   Date Value Ref Range Status   2021 0.67 0.52 - 1.04 mg/dL Final   ]  -GI: Will advance diet today as tolerated to regular diet. No BM. Continue bowel regimen  -:  Lassiter in place d/t limited mobility and need for accurate I&O, will plan to remove tomorrow if UOP adequate  -Endo: pre op A1c: 5.0. Minimal insulin infusion requirements, will transition to sliding scale insulin today.  -FEN: replete lytes as needed, ADAT. Na: 133. K: 4.7  Orders Placed This Encounter      NPO for Medical/Clinical Reasons Except for: Meds      Advance Diet as Tolerated: Clear Liquid Diet      Advance Diet as Tolerated: Clear Liquid Diet; Low Saturated Fat Na <2400mg Diet      Advance Diet as Tolerated: Clear Liquid Diet      Advance Diet as Tolerated: Full Liquid Diet; Low Saturated Fat Na <2400mg Diet    -ID: Temp (24hrs), Av.8  F (37.7  C), Min:98.1  F (36.7  C), Max:100.4  F (38  C)   Completing perioperative abx. Leukocytosis likely related to stress from surgery, continue to monitor.   WBC Count   Date Value Ref Range  Status   08/20/2021 11.2 (H) 4.0 - 11.0 10e3/uL Final   ]  -Heme: plt: 203. Acute blood loss anemia and thrombocytopenia related to surgery. Plan to start coumadin today for mechanical aortic valve. INR goal 2-3.  Hemoglobin   Date Value Ref Range Status   08/20/2021 10.2 (L) 11.7 - 15.7 g/dL Final   ],   -Proph: PCD, ASA, PPI, sub q heparin  -Dispo: Transfer to [facility]. Chest tubes and dean to remain in today. Initiate therapies. Continue to encourage IS, cough, deep breathing, ambulation.           Interval History:   Extubated and weaned off pressors. Saturating well on nasal cannula. Pain controlled, reports back and shoulder pain. Has not eaten yet since surgery, will plan to advance diet today. No BM.          Medications:       acetaminophen  975 mg Oral Q8H     aspirin  81 mg Oral or NG Tube Daily     ceFAZolin  1 g Intravenous Q8H     DULoxetine  20 mg Oral Daily     [START ON 8/23/2021] gabapentin  100 mg Oral Q8H     [START ON 8/21/2021] gabapentin  300 mg Oral Q8H     gabapentin  600 mg Oral Q8H     heparin ANTICOAGULANT  5,000 Units Subcutaneous Q8H     insulin aspart  1-7 Units Subcutaneous TID AC     insulin aspart  1-5 Units Subcutaneous At Bedtime     mupirocin  0.5 g Both Nostrils BID     pantoprazole  40 mg Oral or NG Tube Daily    Or     pantoprazole  40 mg Oral Daily     polyethylene glycol  17 g Oral Daily     senna-docusate  1 tablet Oral BID     sodium chloride (PF)  3 mL Intracatheter Q8H     vancomycin  1,000 mg Intravenous Q12H     [START ON 8/22/2021] acetaminophen, albuterol, bisacodyl, glucose **OR** dextrose **OR** glucagon, hydrALAZINE, HYDROmorphone **OR** HYDROmorphone, lidocaine 4%, lidocaine (buffered or not buffered), magnesium hydroxide, methocarbamol, naloxone **OR** naloxone **OR** naloxone **OR** naloxone, ondansetron **OR** ondansetron, oxyCODONE **OR** oxyCODONE, phenylephrine, BETA BLOCKER NOT PRESCRIBED, sodium chloride (PF), Warfarin Therapy Reminder          Physical  "Exam:   Vitals were reviewed  Blood pressure 110/68, pulse 66, temperature 99.3  F (37.4  C), resp. rate 16, height 1.651 m (5' 5\"), weight 62.6 kg (138 lb 0.1 oz), SpO2 98 %.  Rhythm: NSR    Lungs: diminished bases    Cardiovascular: RRR normal s1 and s2    Abdomen: soft NTND    Extremeties: minimal edema    Incision: CDI    CT: to suction    Weight:   Vitals:    08/15/21 1815 08/16/21 0400 08/17/21 0400 08/18/21 0603   Weight: 57.5 kg (126 lb 12.2 oz) 59.3 kg (130 lb 11.7 oz) 59.4 kg (130 lb 15.3 oz) 58.2 kg (128 lb 3.2 oz)    08/20/21 0530   Weight: 62.6 kg (138 lb 0.1 oz)            Data:   Labs:   Lab Results   Component Value Date    WBC 11.2 08/20/2021     Lab Results   Component Value Date    RBC 3.11 08/20/2021     Lab Results   Component Value Date    HGB 10.2 08/20/2021     Lab Results   Component Value Date    HCT 30.5 08/20/2021     No components found for: MCT  Lab Results   Component Value Date    MCV 98 08/20/2021     Lab Results   Component Value Date    MCH 32.8 08/20/2021     Lab Results   Component Value Date    MCHC 33.4 08/20/2021     Lab Results   Component Value Date    RDW 12.9 08/20/2021     Lab Results   Component Value Date     08/20/2021       Last Basic Metabolic Panel:  Lab Results   Component Value Date     08/20/2021      Lab Results   Component Value Date    POTASSIUM 4.7 08/20/2021     Lab Results   Component Value Date    CHLORIDE 105 08/20/2021     Lab Results   Component Value Date    KARLY 7.9 08/20/2021     Lab Results   Component Value Date    CO2 23 08/20/2021     Lab Results   Component Value Date    BUN 12 08/20/2021     Lab Results   Component Value Date    CR 0.67 08/20/2021     Lab Results   Component Value Date     08/20/2021     08/20/2021       CXR: 8/20/21    FINDINGS: Sternal wires, mediastinal clips and a heart valve prosthesis. Right IJ pulmonary artery catheter. Mediastinal drains. No pneumothorax. The heart is at the upper limits of " normal in size. There is a left pleural effusion. Mild bibasilar   infiltrates.                                                                      IMPRESSION: No pneumothorax.    Krystyna Rico PA-C  CV Surgery  Pager #[phone]

## 2021-08-20 NOTE — PROVIDER NOTIFICATION
Informed VALERIA Rico regarding pt expressing pain around surgical/chest site, feeling anxious. CIWA score 4. Will monitor for now.

## 2021-08-20 NOTE — PROGRESS NOTES
[facility]    Hospitalist Progress Note    Assessment & Plan   Ms. Lezama is a 55-year-old female with a past medical history significant for known bicuspid aortic valve with aortic stenosis, alcohol use, hypertension and anxiety, who presents to the Emergency Department with shortness of breath, palpitations and found to have critical aortic stenosis.      Acute hypoxic respiratory failure due to acutely decompensated congestive heart failure from critical aortic stenosis:  -s/p Aortic valve replacement with a 23 mm [employer] [place] mechanical valve, intraoperative DEVAUGHN by Dr. Stephanie Montaño, 8/19/21    -  The patient's respiratory status has improved with IV diuresis and she has subsequently been extubated on 8/16   - Cardiology and CV surgery are following and the patient is tentatively scheduled for a surgical aortic valve replacement on Thursday, 08/19/2021, as she is too tenuous to discharge to home.   - Cardiology has recommended against any scheduled diuresis given her tenuous hemodynamics.  -  Echocardiogram as above shows a newly depressed EF of 25-30% with severe aortic stenosis and a calculated valve area of 0.6 cm2, mean gradient of 54 mmHg and peak velocity of 4.4 meters/second.   - The patient will continue to be in the hospital until surgery on Thursday.   - monitor hemodynamics closely as well as her respiratory and volume status.  -Left and right heart cath (8/17): Moderately elevated right and left sided filling pressures, no obstructive CAD.  -s/p Aortic valve replacement with a 23 mm [employer] [place] mechanical valve, intraoperative DEVAUGHN by Dr. Stephanie Montaño, 8/19/21    -transferred to ICU postop, extubated night of surgery  - weaned off pressors. Off oxygen  - post op atrial fibrillation, amiodorone gtt  -off amiodorone  -post op insulin gtt. Now off., good glycemic control  - wean off oxygen  - good UO. Up 6kg from preop wt. Holding diuretics today. Daily I/O,  wt  -adat today  -doing well post op. Good pain control  -discussed coumadin Rx with pt and   -off oxygen  - feeling better.   - CT to suction  -nl neuro exam  -good UO    Plan:  Adat, wean oxygen  - coumadin for [facility] aortic valve, goal INR 2-3.   -amiodorone discontinued.   -lasix today per surgery  -CT management per surgery        Endo:  HbA1C. 5.0  Off post op insulin gtt  -BS on floor 100 range.   ISS for now, qid accmichelleeck,        Non-ST elevation myocardial infarction:   - The patient had an angiogram showing minimal disease at 20% in the LAD and RCA, suggesting this is demand ischemia from the critical aortic stenosis and acutely decompensated CHF.    -no cad on angiogram 8/17.   - on ASA per surgery    Alcohol use:   - The extent of her drinking is not entirely clear and she has not definitively gone through any alcohol withdrawal though was sedated with propofol, so we will certainly need to continue to monitor.   - There is some concern that she is drinking more than she lets on.  ]  - for now, we will continue with Neurontin, multivitamin, thiamine and folate and monitor closely for evidence of alcohol withdrawal.    - counseled her today on the need to minimize alcohol use going forward and CD counselor has been requested.  -initiated on neurontin on admission.   - no signs etoh w/d, 5 days into hospitalization    -alert. No clear signs etoh w/d currently. Oriented fully  -stop neurontin started on admission for possible etoh w/d  - no etoh w/d  - agrees to meet with chem dep, reordered on 8/21       Depression with anxiety colon:   -Cymbalta is on her PTA list, but she has not yet began this  -cont cymbalta---> hold given low Na level  -follow up pcp  -avoid etoh        Tobacco dependence:  The patient smokes 1 pack of cigarettes per day and was counseled on the need to quit.  She declined nicotine patch at this point.      Deep venous thrombosis prophylaxis:  Enoxaparin before surgery. Now  being started on coumadin for [facility] aortic valve per surgery,. Subcutaneous heparin until inr at goal   Daily inr    ; dean in place---> discontinued 8/21      Covid: negative pcr. Low suspicion  Not been vaccinated yet  Recommend vaccination after discharage.     Full code. Discussed 8/18    Diet adat per surgery      Disposition:  anticipate discharge home.   -- TBD per surgery  ->2 days         Romeo Watkins MD  Text Page  (7am to 6pm)  Interval History   Some anxiety at night.  Feeling better  No new issues.   Has chronic nearly daily anxiety  Agrees to meet with chem dep counseler    -Data reviewed today: I reviewed all new labs and imaging results over the last 24 hours. I personally reviewed labs and imaging last 24 hours.     Physical Exam   Temp: 99.5  F (37.5  C)   BP: 105/67 Pulse: 70   Resp: 23 SpO2: 95 % O2 Device: Nasal cannula Oxygen Delivery: 6 LPM  Vitals:    08/17/21 0400 08/18/21 0603 08/20/21 0530   Weight: 59.4 kg (130 lb 15.3 oz) 58.2 kg (128 lb 3.2 oz) 62.6 kg (138 lb 0.1 oz)     Vital Signs with Ranges  Temp:  [97.5  F (36.4  C)-99.1  F (37.3  C)] 98.5  F (36.9  C)  Pulse:  [66-81] 81  Resp:  [15-30] 16  BP: (116-145)/(49-74) 144/70  FiO2 (%):  [5 %] 5 %  SpO2:  [90 %-98 %] 95 %  I/O last 3 completed shifts:  In: 707.16 [P.O.:580; I.V.:127.16]  Out: 1190 [Urine:570; Chest Tube:620]    Constitutional: Up in bed, nad  Respiratory: Breathing easily, decreased bases  Cardiovascular: RRR no r/g. [place] mechanical S2. No r/g/m  Chest: CT in place  GI: soft, nd, nd  Skin/Integumen: no rash or edema  Neuro: fully oriented, alert, coherent.   no tremors, nl speech and mentation  Psych- nl affect. Pleasant, cooperative.        Medications     dextrose 5% and 0.45% NaCl + KCl 20 mEq/L Stopped (08/20/21 0906)     phenylephrine Stopped (08/20/21 0430)     BETA BLOCKER NOT PRESCRIBED       sodium chloride Stopped (08/20/21 0600)     Warfarin Therapy Reminder         acetaminophen  975 mg Oral Q8H      aspirin  81 mg Oral or NG Tube Daily     DULoxetine  20 mg Oral Daily     folic acid  1 mg Oral Daily     heparin ANTICOAGULANT  5,000 Units Subcutaneous Q8H     insulin aspart  1-7 Units Subcutaneous TID AC     insulin aspart  1-5 Units Subcutaneous At Bedtime     losartan  25 mg Oral Daily     metoprolol tartrate  12.5 mg Oral BID     multivitamin w/minerals  1 tablet Oral Daily     mupirocin  0.5 g Both Nostrils BID     pantoprazole  40 mg Oral or NG Tube Daily    Or     pantoprazole  40 mg Oral Daily     polyethylene glycol  17 g Oral Daily     senna-docusate  1 tablet Oral BID     sodium chloride (PF)  3 mL Intracatheter Q8H     thiamine  100 mg Oral TID     warfarin ANTICOAGULANT  3.5 mg Oral ONCE at 18:00       Data   Recent Labs   Lab 08/21/21  1151 08/21/21  1142 08/21/21  0818 08/21/21  0704 08/20/21  0919 08/20/21  0446 08/19/21  1223 08/17/21  0515 08/16/21  1305 08/15/21  1750 08/15/21  1427   WBC  --   --   --  13.0*  --  11.2* 10.4   < >  --    < > 10.6   HGB  --   --   --  9.6*  --  10.2* 11.1*   < >  --    < > 15.1   MCV  --   --   --  96  --  98 99   < >  --    < > 95   PLT  --   --   --  161  --  203 161   < >  --    < > 302   INR  --   --   --  1.84* 1.29*  --  1.28*   < >  --   --   --    *  --   --  123*  --  133 140   < >  --    < > 131*   POTASSIUM 4.8  --   --  4.9  --  4.7 3.7  3.7   < >  --    < > 3.9   CHLORIDE 94  --   --  95  --  105 111*  --   --    < > 103   CO2 22  --   --  22  --  23 24  --   --    < > 20   BUN 13  --   --  14  --  12 8  --   --    < > 10   CR 0.60  --   --  0.64  --  0.67 0.74  --   --    < > 0.72   ANIONGAP 7  --   --  6  --  5 5  --   --    < > 8   KARLY 8.6  --   --  8.1*  --  7.9* 8.1*  --   --    < > 8.8   * 112* 107* 99  --  118* 163*   < >  --    < > 92   ALBUMIN  --   --   --   --   --  2.9* 2.7*  --   --   --  4.2   PROTTOTAL  --   --   --   --   --  5.3* 5.0*  --   --   --  7.5   BILITOTAL  --   --   --   --   --  0.9 0.5  --   --   --  0.7    ALKPHOS  --   --   --   --   --  59 64  --   --   --  97   ALT  --   --   --   --   --  37 32  --   --   --  39   AST  --   --   --   --   --  65* 55*  --   --   --  39   LIPASE  --   --   --   --   --   --   --   --   --   --  133   TROPONIN  --   --   --   --   --   --   --   --  0.272*  --  0.127*    < > = values in this interval not displayed.       Imaging:   No results found for this or any previous visit (from the past 24 hour(s)).

## 2021-08-20 NOTE — PROGRESS NOTES
I: SW consult for CD/discharge planning was closed. CD counselor did follow up w/patient directly; PT recommending OP PT at time of discharge.    P: No SW interventions anticipated.    CARMELO Shaw   [facility]  [phone]

## 2021-08-20 NOTE — PROGRESS NOTES
Pt A&O, anxious, tremors at times. CIWA 2-4. VSS on 4-5- LNC. Encourage pulmonary hygiene. HR high 60s-high 70s. Sats high 90s. Borderline urine output, Provider aware. Tolerating diet. Chest tube output 280ml this shift, Provider aware. Will check hgb tomorrow per provider.  by bedside, supportive. All belongings send with pt to room 330.

## 2021-08-20 NOTE — PHARMACY-ANTICOAGULATION SERVICE
Clinical Pharmacy - Warfarin Dosing Consult     Pharmacy has been consulted to manage this patient s warfarin therapy.  Indication: Mechanical Aortic Valve Replacement  Therapy Goal: INR 2-3  Warfarin Prior to Admission: No  Significant drug interactions: Aspirin  Recent documented change in oral intake/nutrition: Unknown    INR   Date Value Ref Range Status   08/20/2021 1.29 (H) 0.85 - 1.15 Final     Comment:     Effective 7/11/2021, the reference range for this assay has changed.   08/19/2021 1.28 (H) 0.85 - 1.15 Final     Comment:     Effective 7/11/2021, the reference range for this assay has changed.       Recommend warfarin 5 mg today.  Pharmacy will monitor Mayda Lezama daily and order warfarin doses to achieve specified goal.      Please contact pharmacy as soon as possible if the warfarin needs to be held for a procedure or if the warfarin goals change.    Cheyanne Brennan, PharmD, BCPS

## 2021-08-21 ENCOUNTER — APPOINTMENT (OUTPATIENT)
Dept: PHYSICAL THERAPY | Facility: CLINIC | Age: 56
End: 2021-08-21
Payer: COMMERCIAL

## 2021-08-21 LAB
ALBUMIN UR-MCNC: NEGATIVE MG/DL
ANION GAP SERPL CALCULATED.3IONS-SCNC: 6 MMOL/L (ref 3–14)
ANION GAP SERPL CALCULATED.3IONS-SCNC: 7 MMOL/L (ref 3–14)
APPEARANCE UR: CLEAR
ATRIAL RATE - MUSE: 89 BPM
BILIRUB UR QL STRIP: NEGATIVE
BUN SERPL-MCNC: 13 MG/DL (ref 7–30)
BUN SERPL-MCNC: 14 MG/DL (ref 7–30)
CALCIUM SERPL-MCNC: 8.1 MG/DL (ref 8.5–10.1)
CALCIUM SERPL-MCNC: 8.6 MG/DL (ref 8.5–10.1)
CHLORIDE BLD-SCNC: 94 MMOL/L (ref 94–109)
CHLORIDE BLD-SCNC: 95 MMOL/L (ref 94–109)
CO2 SERPL-SCNC: 22 MMOL/L (ref 20–32)
CO2 SERPL-SCNC: 22 MMOL/L (ref 20–32)
COLOR UR AUTO: ABNORMAL
CREAT SERPL-MCNC: 0.6 MG/DL (ref 0.52–1.04)
CREAT SERPL-MCNC: 0.64 MG/DL (ref 0.52–1.04)
CREAT UR-MCNC: 23 MG/DL
DIASTOLIC BLOOD PRESSURE - MUSE: NORMAL MMHG
ERYTHROCYTE [DISTWIDTH] IN BLOOD BY AUTOMATED COUNT: 12.5 % (ref 10–15)
GFR SERPL CREATININE-BSD FRML MDRD: >90 ML/MIN/1.73M2
GFR SERPL CREATININE-BSD FRML MDRD: >90 ML/MIN/1.73M2
GLUCOSE BLD-MCNC: 101 MG/DL (ref 70–99)
GLUCOSE BLD-MCNC: 99 MG/DL (ref 70–99)
GLUCOSE BLDC GLUCOMTR-MCNC: 101 MG/DL (ref 70–99)
GLUCOSE BLDC GLUCOMTR-MCNC: 103 MG/DL (ref 70–99)
GLUCOSE BLDC GLUCOMTR-MCNC: 107 MG/DL (ref 70–99)
GLUCOSE BLDC GLUCOMTR-MCNC: 112 MG/DL (ref 70–99)
GLUCOSE BLDC GLUCOMTR-MCNC: 98 MG/DL (ref 70–99)
GLUCOSE UR STRIP-MCNC: NEGATIVE MG/DL
HCT VFR BLD AUTO: 28 % (ref 35–47)
HGB BLD-MCNC: 9.6 G/DL (ref 11.7–15.7)
HGB UR QL STRIP: ABNORMAL
INR PPP: 1.84 (ref 0.85–1.15)
INTERPRETATION ECG - MUSE: NORMAL
KETONES UR STRIP-MCNC: NEGATIVE MG/DL
LEUKOCYTE ESTERASE UR QL STRIP: NEGATIVE
MAGNESIUM SERPL-MCNC: 1.8 MG/DL (ref 1.6–2.3)
MCH RBC QN AUTO: 32.9 PG (ref 26.5–33)
MCHC RBC AUTO-ENTMCNC: 34.3 G/DL (ref 31.5–36.5)
MCV RBC AUTO: 96 FL (ref 78–100)
MUCOUS THREADS #/AREA URNS LPF: PRESENT /LPF
NITRATE UR QL: NEGATIVE
OSMOLALITY SERPL: 268 MMOL/KG (ref 275–295)
OSMOLALITY UR: 231 MMOL/KG (ref 100–1200)
P AXIS - MUSE: 63 DEGREES
PH UR STRIP: 5.5 [PH] (ref 5–7)
PHOSPHATE SERPL-MCNC: 2.7 MG/DL (ref 2.5–4.5)
PLATELET # BLD AUTO: 161 10E3/UL (ref 150–450)
POTASSIUM BLD-SCNC: 4.8 MMOL/L (ref 3.4–5.3)
POTASSIUM BLD-SCNC: 4.9 MMOL/L (ref 3.4–5.3)
PR INTERVAL - MUSE: 188 MS
QRS DURATION - MUSE: 146 MS
QT - MUSE: 486 MS
QTC - MUSE: 591 MS
R AXIS - MUSE: -76 DEGREES
RBC # BLD AUTO: 2.92 10E6/UL (ref 3.8–5.2)
RBC URINE: 8 /HPF
SODIUM SERPL-SCNC: 123 MMOL/L (ref 133–144)
SODIUM SERPL-SCNC: 123 MMOL/L (ref 133–144)
SODIUM SERPL-SCNC: 129 MMOL/L (ref 133–144)
SODIUM UR-SCNC: 41 MMOL/L
SP GR UR STRIP: 1.01 (ref 1–1.03)
SYSTOLIC BLOOD PRESSURE - MUSE: NORMAL MMHG
T AXIS - MUSE: 115 DEGREES
TSH SERPL DL<=0.005 MIU/L-ACNC: 2.77 MU/L (ref 0.4–4)
URATE 24H UR-MRATE: 0.7 G/G CR
URATE UR-MCNC: 16.1 MG/DL
UROBILINOGEN UR STRIP-MCNC: NORMAL MG/DL
VENTRICULAR RATE- MUSE: 89 BPM
WBC # BLD AUTO: 13 10E3/UL (ref 4–11)
WBC URINE: 1 /HPF

## 2021-08-21 PROCEDURE — 84300 ASSAY OF URINE SODIUM: CPT | Performed by: INTERNAL MEDICINE

## 2021-08-21 PROCEDURE — 120N000001 HC R&B MED SURG/OB

## 2021-08-21 PROCEDURE — 250N000011 HC RX IP 250 OP 636: Performed by: PHYSICIAN ASSISTANT

## 2021-08-21 PROCEDURE — 85610 PROTHROMBIN TIME: CPT | Performed by: PHYSICIAN ASSISTANT

## 2021-08-21 PROCEDURE — 85027 COMPLETE CBC AUTOMATED: CPT | Performed by: PHYSICIAN ASSISTANT

## 2021-08-21 PROCEDURE — 250N000009 HC RX 250: Performed by: PHYSICIAN ASSISTANT

## 2021-08-21 PROCEDURE — 84100 ASSAY OF PHOSPHORUS: CPT | Performed by: PHYSICIAN ASSISTANT

## 2021-08-21 PROCEDURE — 99232 SBSQ HOSP IP/OBS MODERATE 35: CPT | Performed by: INTERNAL MEDICINE

## 2021-08-21 PROCEDURE — 83935 ASSAY OF URINE OSMOLALITY: CPT | Performed by: INTERNAL MEDICINE

## 2021-08-21 PROCEDURE — 83735 ASSAY OF MAGNESIUM: CPT | Performed by: PHYSICIAN ASSISTANT

## 2021-08-21 PROCEDURE — 250N000013 HC RX MED GY IP 250 OP 250 PS 637: Performed by: PHYSICIAN ASSISTANT

## 2021-08-21 PROCEDURE — 250N000013 HC RX MED GY IP 250 OP 250 PS 637: Performed by: SURGERY

## 2021-08-21 PROCEDURE — 36415 COLL VENOUS BLD VENIPUNCTURE: CPT | Performed by: INTERNAL MEDICINE

## 2021-08-21 PROCEDURE — 97530 THERAPEUTIC ACTIVITIES: CPT | Mod: GP

## 2021-08-21 PROCEDURE — 97110 THERAPEUTIC EXERCISES: CPT | Mod: GP

## 2021-08-21 PROCEDURE — 84560 ASSAY OF URINE/URIC ACID: CPT | Performed by: INTERNAL MEDICINE

## 2021-08-21 PROCEDURE — 81001 URINALYSIS AUTO W/SCOPE: CPT | Performed by: INTERNAL MEDICINE

## 2021-08-21 PROCEDURE — 84443 ASSAY THYROID STIM HORMONE: CPT | Performed by: INTERNAL MEDICINE

## 2021-08-21 PROCEDURE — 80048 BASIC METABOLIC PNL TOTAL CA: CPT | Performed by: PHYSICIAN ASSISTANT

## 2021-08-21 PROCEDURE — 36415 COLL VENOUS BLD VENIPUNCTURE: CPT | Performed by: PHYSICIAN ASSISTANT

## 2021-08-21 PROCEDURE — 84295 ASSAY OF SERUM SODIUM: CPT | Performed by: INTERNAL MEDICINE

## 2021-08-21 PROCEDURE — 83930 ASSAY OF BLOOD OSMOLALITY: CPT | Performed by: INTERNAL MEDICINE

## 2021-08-21 RX ORDER — LOSARTAN POTASSIUM 25 MG/1
25 TABLET ORAL DAILY
Status: DISCONTINUED | OUTPATIENT
Start: 2021-08-21 | End: 2021-08-22

## 2021-08-21 RX ORDER — FUROSEMIDE 10 MG/ML
20 INJECTION INTRAMUSCULAR; INTRAVENOUS ONCE
Status: COMPLETED | OUTPATIENT
Start: 2021-08-21 | End: 2021-08-21

## 2021-08-21 RX ADMIN — ACETAMINOPHEN 975 MG: 325 TABLET, FILM COATED ORAL at 13:11

## 2021-08-21 RX ADMIN — FOLIC ACID 1 MG: 1 TABLET ORAL at 08:18

## 2021-08-21 RX ADMIN — SENNOSIDES AND DOCUSATE SODIUM 1 TABLET: 8.6; 5 TABLET ORAL at 20:57

## 2021-08-21 RX ADMIN — OXYCODONE HYDROCHLORIDE 5 MG: 5 TABLET ORAL at 00:44

## 2021-08-21 RX ADMIN — HEPARIN SODIUM 5000 UNITS: 5000 INJECTION INTRAVENOUS; SUBCUTANEOUS at 13:11

## 2021-08-21 RX ADMIN — LORAZEPAM 0.5 MG: 0.5 TABLET ORAL at 17:03

## 2021-08-21 RX ADMIN — FUROSEMIDE 20 MG: 10 INJECTION, SOLUTION INTRAVENOUS at 13:11

## 2021-08-21 RX ADMIN — OXYCODONE HYDROCHLORIDE 5 MG: 5 TABLET ORAL at 06:09

## 2021-08-21 RX ADMIN — DULOXETINE 20 MG: 20 CAPSULE, DELAYED RELEASE ORAL at 08:18

## 2021-08-21 RX ADMIN — THIAMINE HCL TAB 100 MG 100 MG: 100 TAB at 15:08

## 2021-08-21 RX ADMIN — LORAZEPAM 0.5 MG: 0.5 TABLET ORAL at 10:17

## 2021-08-21 RX ADMIN — THIAMINE HCL TAB 100 MG 100 MG: 100 TAB at 08:18

## 2021-08-21 RX ADMIN — WARFARIN SODIUM 3.5 MG: 2.5 TABLET ORAL at 17:03

## 2021-08-21 RX ADMIN — MULTIPLE VITAMINS W/ MINERALS TAB 1 TABLET: TAB at 08:19

## 2021-08-21 RX ADMIN — THIAMINE HCL TAB 100 MG 100 MG: 100 TAB at 22:05

## 2021-08-21 RX ADMIN — ONDANSETRON 4 MG: 2 INJECTION INTRAMUSCULAR; INTRAVENOUS at 10:39

## 2021-08-21 RX ADMIN — LORAZEPAM 0.5 MG: 0.5 TABLET ORAL at 23:13

## 2021-08-21 RX ADMIN — ACETAMINOPHEN 975 MG: 325 TABLET, FILM COATED ORAL at 06:09

## 2021-08-21 RX ADMIN — LOSARTAN POTASSIUM 25 MG: 25 TABLET, FILM COATED ORAL at 14:39

## 2021-08-21 RX ADMIN — SENNOSIDES AND DOCUSATE SODIUM 1 TABLET: 8.6; 5 TABLET ORAL at 08:19

## 2021-08-21 RX ADMIN — OXYCODONE HYDROCHLORIDE 5 MG: 5 TABLET ORAL at 20:57

## 2021-08-21 RX ADMIN — ASPIRIN 81 MG CHEWABLE TABLET 81 MG: 81 TABLET CHEWABLE at 08:19

## 2021-08-21 RX ADMIN — HEPARIN SODIUM 5000 UNITS: 5000 INJECTION INTRAVENOUS; SUBCUTANEOUS at 22:06

## 2021-08-21 RX ADMIN — OXYCODONE HYDROCHLORIDE 5 MG: 5 TABLET ORAL at 15:08

## 2021-08-21 RX ADMIN — POLYETHYLENE GLYCOL 3350 17 G: 17 POWDER, FOR SOLUTION ORAL at 10:17

## 2021-08-21 RX ADMIN — METOPROLOL TARTRATE 12.5 MG: 25 TABLET, FILM COATED ORAL at 20:57

## 2021-08-21 RX ADMIN — HEPARIN SODIUM 5000 UNITS: 5000 INJECTION INTRAVENOUS; SUBCUTANEOUS at 06:10

## 2021-08-21 RX ADMIN — MUPIROCIN 0.5 G: 20 OINTMENT TOPICAL at 00:48

## 2021-08-21 RX ADMIN — ACETAMINOPHEN 975 MG: 325 TABLET, FILM COATED ORAL at 22:05

## 2021-08-21 RX ADMIN — PANTOPRAZOLE SODIUM 40 MG: 40 TABLET, DELAYED RELEASE ORAL at 08:19

## 2021-08-21 RX ADMIN — MUPIROCIN 0.5 G: 20 OINTMENT TOPICAL at 09:12

## 2021-08-21 ASSESSMENT — MIFFLIN-ST. JEOR: SCORE: 1224.56

## 2021-08-21 ASSESSMENT — ACTIVITIES OF DAILY LIVING (ADL)
ADLS_ACUITY_SCORE: 18

## 2021-08-21 NOTE — PROGRESS NOTES
[facility]    Hospitalist Progress Note    Assessment & Plan   Ms. Lezama is a 55-year-old female with a past medical history significant for known bicuspid aortic valve with aortic stenosis, alcohol use, hypertension and anxiety, who presents to the Emergency Department with shortness of breath, palpitations and found to have critical aortic stenosis.      Acute hypoxic respiratory failure due to acutely decompensated systolic congestive heart failure from critical aortic stenosis:  -s/p Aortic valve replacement with a 23 mm [employer] [place] mechanical valve, intraoperative DEVAUGHN by Dr. Stephanie Montaño, 8/19/21   -acute systolic heart failure secondary to critical aortic stenosis  -  The patient's respiratory status has improved with IV diuresis and she has subsequently been extubated on 8/16   - Cardiology and CV surgery are following and the patient is tentatively scheduled for a surgical aortic valve replacement on Thursday, 08/19/2021, as she is too tenuous to discharge to home.   - Cardiology has recommended against any scheduled diuresis given her tenuous hemodynamics.  -  Echocardiogram as above shows a newly depressed EF of 25-30% with severe aortic stenosis and a calculated valve area of 0.6 cm2, mean gradient of 54 mmHg and peak velocity of 4.4 meters/second.   - The patient will continue to be in the hospital until surgery on Thursday.   - monitor hemodynamics closely as well as her respiratory and volume status.  -Left and right heart cath (8/17): Moderately elevated right and left sided filling pressures, no obstructive CAD.  -s/p Aortic valve replacement with a 23 mm [employer] [place] mechanical valve, intraoperative DEVAUGHN by Dr. Stephanie Montaño, 8/19/21    -transferred to ICU postop, extubated night of surgery  - weaned off pressors. Off oxygen  - post op atrial fibrillation, amiodorone gtt  -off amiodorone  -post op insulin gtt. Now off., good glycemic control  - wean off  oxygen  - good UO. Up 6kg from preop wt. Holding diuretics today. Daily I/O, wt  -adat today  -doing well post op. Good pain control  -discussed coumadin Rx with pt and   -off oxygen  - feeling better.   - CT to suction  -nl neuro exam  -good UO    Plan:  Adat, wean oxygen  - coumadin for [facility] aortic valve, goal INR 2-3.   -amiodorone discontinued.   -lasix today per surgery  -CT management per surgery    Acute Hyponatremia - new 8/21  -131 admission.   - 130s-140 periop  -Na 133 yesterday,   - down to 123 0700, stable at 123 at noon today  -given lasix 20mg IV for volume up per surgery, first dose in several days  -placed on fluid restriction  -asymptomatic  -nl neuro status.   - ml yesterday, ~1 range day before, I/O -5.2L  -no excessive fluid intake  -no clear culprit meds  -no hypotonic fluids.   - unclear etiology  -urine studies to clarify.   -taking some po  -not having much pain.   - asymptomatic    Plan;   - Urine Na (but had lasix), Urine osmol, Urine uric acid  -  serum osmol  -TSH  -consult nephrology to assist management, rate of correction, etiology, ? Need 3% NS to get level up 4-6 meq today      Endo:  HbA1C. 5.0  Off post op insulin gtt  -BS on floor 100 range.   ISS for now, qid accucheck,        Non-ST elevation myocardial infarction:   - The patient had an angiogram showing minimal disease at 20% in the LAD and RCA, suggesting this is demand ischemia from the critical aortic stenosis and acutely decompensated CHF.    -no cad on angiogram 8/17.   - on ASA per surgery    Alcohol use:   - The extent of her drinking is not entirely clear and she has not definitively gone through any alcohol withdrawal though was sedated with propofol, so we will certainly need to continue to monitor.   - There is some concern that she is drinking more than she lets on.  ]  - for now, we will continue with Neurontin, multivitamin, thiamine and folate and monitor closely for evidence of alcohol withdrawal.     - counseled her today on the need to minimize alcohol use going forward and CD counselor has been requested.  -initiated on neurontin on admission.   - no signs etoh w/d, 5 days into hospitalization    -alert. No clear signs etoh w/d currently. Oriented fully  -stop neurontin started on admission for possible etoh w/d  - no etoh w/d  - agrees to meet with chem dep, reordered on 8/21       Depression with anxiety colon:   -Cymbalta is on her PTA list, but she has not yet began this  -cont cymbalta---> hold given low Na level  -follow up pcp  -avoid etoh        Tobacco dependence:  The patient smokes 1 pack of cigarettes per day and was counseled on the need to quit.  She declined nicotine patch at this point.      Deep venous thrombosis prophylaxis:  Enoxaparin before surgery. Now being started on coumadin for mech aortic valve per surgery,. Subcutaneous heparin until inr at goal   Daily inr    ; dean in place---> discontinued 8/21      Covid: negative pcr. Low suspicion  Not been vaccinated yet  Recommend vaccination after discharage.     Full code. Discussed 8/18    Diet adat per surgery      Disposition:  anticipate discharge home.   -- TBD per surgery  ->2 days         Romeo Watkins MD  Text Page  (7am to 6pm)  Interval History   Some anxiety at night.  Feeling better  No new issues.   Has chronic nearly daily anxiety  Agrees to meet with chem dep counseler    -Data reviewed today: I reviewed all new labs and imaging results over the last 24 hours. I personally reviewed labs and imaging last 24 hours.     Physical Exam   Temp: 98.5  F (36.9  C) Temp src: Oral BP: (!) 144/70 (Post CR) Pulse: 81   Resp: 16 SpO2: 95 % O2 Device: Nasal cannula Oxygen Delivery: 2 LPM  Vitals:    08/20/21 0530 08/20/21 1749 08/21/21 0609   Weight: 62.6 kg (138 lb 0.1 oz) 63.2 kg (139 lb 5.3 oz) 63.4 kg (139 lb 11.2 oz)     Vital Signs with Ranges  Temp:  [97.5  F (36.4  C)-99.1  F (37.3  C)] 98.5  F (36.9  C)  Pulse:   [66-81] 81  Resp:  [15-30] 16  BP: (116-145)/(49-74) 144/70  FiO2 (%):  [5 %] 5 %  SpO2:  [90 %-98 %] 95 %  I/O last 3 completed shifts:  In: 707.16 [P.O.:580; I.V.:127.16]  Out: 1190 [Urine:570; Chest Tube:620]    Constitutional: Up in bed, nad  Respiratory: Breathing easily, decreased bases  Cardiovascular: RRR no r/g. [place] mechanical S2. No r/g/m  Chest: CT in place  GI: soft, nd, nd  Skin/Integumen: no rash or edema  Neuro: fully oriented, alert, coherent.   no tremors, nl speech and mentation  Psych- nl affect. Pleasant, cooperative.        Medications     dextrose 5% and 0.45% NaCl + KCl 20 mEq/L Stopped (08/20/21 0906)     BETA BLOCKER NOT PRESCRIBED       sodium chloride Stopped (08/20/21 0600)     Warfarin Therapy Reminder         acetaminophen  975 mg Oral Q8H     aspirin  81 mg Oral or NG Tube Daily     [Held by provider] DULoxetine  20 mg Oral Daily     folic acid  1 mg Oral Daily     heparin ANTICOAGULANT  5,000 Units Subcutaneous Q8H     insulin aspart  1-7 Units Subcutaneous TID AC     insulin aspart  1-5 Units Subcutaneous At Bedtime     losartan  25 mg Oral Daily     metoprolol tartrate  12.5 mg Oral BID     multivitamin w/minerals  1 tablet Oral Daily     mupirocin  0.5 g Both Nostrils BID     pantoprazole  40 mg Oral or NG Tube Daily    Or     pantoprazole  40 mg Oral Daily     polyethylene glycol  17 g Oral Daily     senna-docusate  1 tablet Oral BID     sodium chloride (PF)  3 mL Intracatheter Q8H     thiamine  100 mg Oral TID     warfarin ANTICOAGULANT  3.5 mg Oral ONCE at 18:00       Data   Recent Labs   Lab 08/21/21  1151 08/21/21  1142 08/21/21  0818 08/21/21  0704 08/20/21  0919 08/20/21  0446 08/19/21  1223 08/17/21  0515 08/16/21  1305 08/15/21  1750 08/15/21  1427   WBC  --   --   --  13.0*  --  11.2* 10.4   < >  --    < > 10.6   HGB  --   --   --  9.6*  --  10.2* 11.1*   < >  --    < > 15.1   MCV  --   --   --  96  --  98 99   < >  --    < > 95   PLT  --   --   --  161  --  203 161   <  >  --    < > 302   INR  --   --   --  1.84* 1.29*  --  1.28*   < >  --   --   --    *  --   --  123*  --  133 140   < >  --    < > 131*   POTASSIUM 4.8  --   --  4.9  --  4.7 3.7  3.7   < >  --    < > 3.9   CHLORIDE 94  --   --  95  --  105 111*  --   --    < > 103   CO2 22  --   --  22  --  23 24  --   --    < > 20   BUN 13  --   --  14  --  12 8  --   --    < > 10   CR 0.60  --   --  0.64  --  0.67 0.74  --   --    < > 0.72   ANIONGAP 7  --   --  6  --  5 5  --   --    < > 8   KARLY 8.6  --   --  8.1*  --  7.9* 8.1*  --   --    < > 8.8   * 112* 107* 99  --  118* 163*   < >  --    < > 92   ALBUMIN  --   --   --   --   --  2.9* 2.7*  --   --   --  4.2   PROTTOTAL  --   --   --   --   --  5.3* 5.0*  --   --   --  7.5   BILITOTAL  --   --   --   --   --  0.9 0.5  --   --   --  0.7   ALKPHOS  --   --   --   --   --  59 64  --   --   --  97   ALT  --   --   --   --   --  37 32  --   --   --  39   AST  --   --   --   --   --  65* 55*  --   --   --  39   LIPASE  --   --   --   --   --   --   --   --   --   --  133   TROPONIN  --   --   --   --   --   --   --   --  0.272*  --  0.127*    < > = values in this interval not displayed.       Imaging:   No results found for this or any previous visit (from the past 24 hour(s)).

## 2021-08-21 NOTE — PLAN OF CARE
VSS. Tele SR with BBB. A&O x4. Pain controlled with oxycodone and tylenol. CIWA score 1, 2 for anxiety. Ativan given x1 with improvement. Chest tube in place. Lassiter in place with borderline output. Sternal incision CDI. CMS intact. Ambulated to bathroom with SBA.

## 2021-08-21 NOTE — PROGRESS NOTES
"NUTRITION ASSESSMENT      REASON FOR ASSESSMENT:  Cardiac Surgery Nutrition Consult    CURRENT DIET / INTAKE:  Diet: Regular    Chart reviewed  Visited with pt this morning ( present)  Pt reports fair appetite  She was eating a PB &J sandwich her  brought in      ANTHROPOMETRICS:   Ht: 5'5\"  Wt: (8/18) 58.2 kg  BMI: 21.3  IBW: 57 kg  Weight Status: Normal BMI  %IBW: 102%    MALNUTRITION:  Patient does not meet two of the following criteria necessary for diagnosing malnutrition:  significant weight loss, reduced intake, subcutaneous fat loss, muscle loss or fluid retention.     NUTRITION DIAGNOSIS:   Increased nutrient needs (protein) R/t higher demand for healing AEB pt is s/p AVR 8/19/21    INTERVENTIONS:    Nutrition Prescription:  Regular diet    Implementation:  Nutrition Education (Content):  Discussed the importance of adequate nutrition post-op for healing and energy, emphasizing protein foods, and encouraged patient to order a protein food at each meal.    Goals:  Patient to consume ~75% at meals     Follow Up/Monitoring (InPatient):  Food and Fluid intake -  Monitor for adequacy    Follow Up/Monitoring (OutPatient):  Patient will participate in out-patient cardiac rehab and attend nutrition classes during the program    "

## 2021-08-21 NOTE — CONSULTS
Care Management Initial Consult    General Information  Assessment completed with: Patient, Spouse or significant other,    Type of CM/SW Visit: Offer D/C Planning    Primary Care Provider verified and updated as needed: Yes   Readmission within the last 30 days:        Reason for Consult: discharge planning  Advance Care Planning:            Communication Assessment  Patient's communication style: spoken language (English or Bilingual)    Hearing Difficulty or Deaf: no   Wear Glasses or Blind: no    Cognitive  Cognitive/Neuro/Behavioral: WDL  Level of Consciousness: alert  Arousal Level: opens eyes spontaneously  Orientation: oriented x 4  Mood/Behavior: cooperative, calm  Best Language: 0 - No aphasia  Speech: logical, clear, spontaneous    Living Environment:   People in home: spouse     Current living Arrangements: house      Able to return to prior arrangements: yes       Family/Social Support:  Care provided by: self  Provides care for:    Marital Status:             Description of Support System:           Current Resources:   Patient receiving home care services: No     Community Resources: None  Equipment currently used at home: none  Supplies currently used at home:      Employment/Financial:  Employment Status: employed full-time        Financial Concerns: No concerns identified           Lifestyle & Psychosocial Needs:  Social Determinants of Health     Tobacco Use: High Risk     Smoking Tobacco Use: Current Every Day Smoker     Smokeless Tobacco Use: Unknown   Alcohol Use:      Frequency of Alcohol Consumption:      Average Number of Drinks:      Frequency of Binge Drinking:    Financial Resource Strain:      Difficulty of Paying Living Expenses:    Food Insecurity:      Worried About Running Out of Food in the Last Year:      Ran Out of Food in the Last Year:    Transportation Needs:      Lack of Transportation (Medical):      Lack of Transportation (Non-Medical):    Physical Activity:       Days of Exercise per Week:      Minutes of Exercise per Session:    Stress:      Feeling of Stress :    Social Connections:      Frequency of Communication with Friends and Family:      Frequency of Social Gatherings with Friends and Family:      Attends Orthodoxy Services:      Active Member of Clubs or Organizations:      Attends Club or Organization Meetings:      Marital Status:    Intimate Partner Violence:      Fear of Current or Ex-Partner:      Emotionally Abused:      Physically Abused:      Sexually Abused:    Depression:      PHQ-2 Score:    Housing Stability:      Unable to Pay for Housing in the Last Year:      Number of Places Lived in the Last Year:      Unstable Housing in the Last Year:        Functional Status:  Prior to admission patient needed assistance:              Mental Health Status:  Mental Health Status: No Current Concerns       Chemical Dependency Status:  Chemical Dependency Status: No Current Concerns             Values/Beliefs:  Spiritual, Cultural Beliefs, Orthodoxy Practices, Values that affect care: no               Additional Information:  Met with patient and  to discuss discharge plan.  Pt lives indep with . PT currently recommending Home with OP PT.  Pt in agreement with this plan. Denies needs at discharge. Currently still on o2, hoping to wean off.   Pt wishes to make own Follow-up appointments.   CC to follow if needs arise. Pt has a ride at discharge.     Wandy Sparks RN

## 2021-08-21 NOTE — PROGRESS NOTES
Patient seen and care plan discussed with Dr. Montaño    [facility]  Cardiovascular and Thoracic Surgery Daily Note          Assessment and Plan:   POD#2 s/p Aortic valve replacement with a 23 mm [employer] [place] mechanical valve, intraoperative DEVAUGHN by Dr. Stephanie Montaño  -CVS: HR: 60s-70s. SBP: 110s-130s. Pre op EF: 25-30%. ASA, BB with hold parameters, no statin as no CAD. Will resume PTA losartan at 25mg daily (PTA dose 50mg) and adjust as needed. NSR. Pacer wires capped. Chest tubes to suction. Chest tube output too much to pull today-- will continue to monitor. Coumadin for mechanical aortic valve. INR: 1.84. INR goal 2-3  -Resp: Extubated within protocol. Saturating well on 2L. Wean as able. Continue to encourage IS, cough, deep breathing, ambulation.   -Neuro:  Grossly intact. Pain controlled. Hx of anxiety- PTA cymbalta resumed.  -Renal: good UOP. upa bout 7kg from preoperative weight. Cr: 0.64. Will give one dose IV lasix 20mg today and monitor response.   -GI:  Tolerating diet despite poor appetite.   -:  Discontinue dean today.  -Endo: pre op A1c: 5.0. Minimal insulin infusion requirements, continue sliding scale insulin.   -FEN: replace electrolytes as needed. Na: 123. K: 4.9. Hyponatremia. Will add fluid restriction today.  -ID: Temp (24hrs), Av.5  F (36.9  C), Min:97.5  F (36.4  C), Max:99.3  F (37.4  C)  WBC: 13.0<11.2, Completed perioperative abx. Leukocytosis likely related to stress from surgery. Continue to monitor.   -Heme: Hgb: 9.6. plt: 161. Acute blood loss anemia and thrombocytopenia related to surgery. On coumadin for mechanical aortic valve. INR goal 2-3, INR 1.84 today.  -Proph: PCD, ASA, BB, PPI, sub q heparin  -Dispo: St. 33. Continue therapies. Continue chest tubes and dean catheter today. Continue to encourage IS, cough, deep breathing, ambulation.           Interval History:   No acute events overnight. Some anxiety. Saturating well on 2L. Pain controlled.  "Tolerating diet but has a poor appetite. No BM.          Medications:       acetaminophen  975 mg Oral Q8H     aspirin  81 mg Oral or NG Tube Daily     DULoxetine  20 mg Oral Daily     folic acid  1 mg Oral Daily     heparin ANTICOAGULANT  5,000 Units Subcutaneous Q8H     insulin aspart  1-7 Units Subcutaneous TID AC     insulin aspart  1-5 Units Subcutaneous At Bedtime     multivitamin w/minerals  1 tablet Oral Daily     mupirocin  0.5 g Both Nostrils BID     pantoprazole  40 mg Oral or NG Tube Daily    Or     pantoprazole  40 mg Oral Daily     polyethylene glycol  17 g Oral Daily     senna-docusate  1 tablet Oral BID     sodium chloride (PF)  3 mL Intracatheter Q8H     thiamine  100 mg Oral TID     warfarin ANTICOAGULANT  3.5 mg Oral ONCE at 18:00     [START ON 8/22/2021] acetaminophen, albuterol, bisacodyl, glucose **OR** dextrose **OR** glucagon, hydrALAZINE, HYDROmorphone **OR** HYDROmorphone, lidocaine 4%, lidocaine (buffered or not buffered), LORazepam, magnesium hydroxide, methocarbamol, naloxone **OR** naloxone **OR** naloxone **OR** naloxone, ondansetron **OR** ondansetron, oxyCODONE **OR** oxyCODONE, BETA BLOCKER NOT PRESCRIBED, sodium chloride (PF), Warfarin Therapy Reminder          Physical Exam:   Vitals were reviewed  Blood pressure 135/66, pulse 81, temperature 97.5  F (36.4  C), temperature source Oral, resp. rate 16, height 1.651 m (5' 5\"), weight 63.4 kg (139 lb 11.2 oz), SpO2 96 %.  Rhythm: NSR    Lungs: diminished bases    Cardiovascular: RRR normal s1 and s2    Abdomen: soft NTND    Extremeties: minimal edema    Incision: CDI    CT: to suction    Weight:   Vitals:    08/17/21 0400 08/18/21 0603 08/20/21 0530 08/20/21 1749   Weight: 59.4 kg (130 lb 15.3 oz) 58.2 kg (128 lb 3.2 oz) 62.6 kg (138 lb 0.1 oz) 63.2 kg (139 lb 5.3 oz)    08/21/21 0609   Weight: 63.4 kg (139 lb 11.2 oz)            Data:   Labs:   Lab Results   Component Value Date    WBC 13.0 08/21/2021     Lab Results   Component " Value Date    RBC 2.92 08/21/2021     Lab Results   Component Value Date    HGB 9.6 08/21/2021     Lab Results   Component Value Date    HCT 28.0 08/21/2021     No components found for: MCT  Lab Results   Component Value Date    MCV 96 08/21/2021     Lab Results   Component Value Date    MCH 32.9 08/21/2021     Lab Results   Component Value Date    MCHC 34.3 08/21/2021     Lab Results   Component Value Date    RDW 12.5 08/21/2021     Lab Results   Component Value Date     08/21/2021       Last Basic Metabolic Panel:  Lab Results   Component Value Date     08/21/2021      Lab Results   Component Value Date    POTASSIUM 4.9 08/21/2021     Lab Results   Component Value Date    CHLORIDE 95 08/21/2021     Lab Results   Component Value Date    KARLY 8.1 08/21/2021     Lab Results   Component Value Date    CO2 22 08/21/2021     Lab Results   Component Value Date    BUN 14 08/21/2021     Lab Results   Component Value Date    CR 0.64 08/21/2021     Lab Results   Component Value Date     08/21/2021    GLC 99 08/21/2021         Krystyna Rico PA-C  CV Surgery  Pager # [phone]

## 2021-08-21 NOTE — PLAN OF CARE
Pt arrived from ICU at 1745; stable vitals on arrival, sating well on 2 liters NC, rhythm sinus with BBB. Chest tubes and dean catheter patent. Rates incisional pain 5/10, declines analgesic. Scoring 4 on CIWA-Ar protocol.

## 2021-08-21 NOTE — PLAN OF CARE
Pt is A&Ox4, SBA, LS diminished, CIWA's discontinued. PIV, SL. VSS on 2L O2, trailed RA, O2 dipped down into the 80's per NA report who placed her back on 2L. Pt had episode of emesis x1 this shift, given IV Zofran. C/o stomach discomfort/fullness- reported not having a BM since Thursday. Senna and Miralax given this shift. Ativan given x1 prior to emesis episode. Lassiter in place d/t ordered Lasix. Tele is SR.

## 2021-08-22 ENCOUNTER — APPOINTMENT (OUTPATIENT)
Dept: PHYSICAL THERAPY | Facility: CLINIC | Age: 56
End: 2021-08-22
Payer: COMMERCIAL

## 2021-08-22 LAB
ANION GAP SERPL CALCULATED.3IONS-SCNC: 4 MMOL/L (ref 3–14)
BUN SERPL-MCNC: 13 MG/DL (ref 7–30)
CALCIUM SERPL-MCNC: 8.7 MG/DL (ref 8.5–10.1)
CHLORIDE BLD-SCNC: 97 MMOL/L (ref 94–109)
CO2 SERPL-SCNC: 27 MMOL/L (ref 20–32)
CORTIS SERPL-MCNC: 34.3 UG/DL (ref 4–22)
CREAT SERPL-MCNC: 0.69 MG/DL (ref 0.52–1.04)
ERYTHROCYTE [DISTWIDTH] IN BLOOD BY AUTOMATED COUNT: 12.2 % (ref 10–15)
GFR SERPL CREATININE-BSD FRML MDRD: >90 ML/MIN/1.73M2
GLUCOSE BLD-MCNC: 93 MG/DL (ref 70–99)
GLUCOSE BLDC GLUCOMTR-MCNC: 106 MG/DL (ref 70–99)
GLUCOSE BLDC GLUCOMTR-MCNC: 89 MG/DL (ref 70–99)
GLUCOSE BLDC GLUCOMTR-MCNC: 94 MG/DL (ref 70–99)
HCT VFR BLD AUTO: 30.7 % (ref 35–47)
HGB BLD-MCNC: 10.2 G/DL (ref 11.7–15.7)
INR PPP: 3.21 (ref 0.85–1.15)
MAGNESIUM SERPL-MCNC: 2 MG/DL (ref 1.6–2.3)
MCH RBC QN AUTO: 32.1 PG (ref 26.5–33)
MCHC RBC AUTO-ENTMCNC: 33.2 G/DL (ref 31.5–36.5)
MCV RBC AUTO: 97 FL (ref 78–100)
PATH REPORT.COMMENTS IMP SPEC: NORMAL
PATH REPORT.COMMENTS IMP SPEC: NORMAL
PATH REPORT.FINAL DX SPEC: NORMAL
PATH REPORT.GROSS SPEC: NORMAL
PATH REPORT.MICROSCOPIC SPEC OTHER STN: NORMAL
PATH REPORT.RELEVANT HX SPEC: NORMAL
PHOTO IMAGE: NORMAL
PLATELET # BLD AUTO: 230 10E3/UL (ref 150–450)
POTASSIUM BLD-SCNC: 4.3 MMOL/L (ref 3.4–5.3)
RBC # BLD AUTO: 3.18 10E6/UL (ref 3.8–5.2)
SARS-COV-2 RNA RESP QL NAA+PROBE: NEGATIVE
SODIUM SERPL-SCNC: 127 MMOL/L (ref 133–144)
SODIUM SERPL-SCNC: 128 MMOL/L (ref 133–144)
WBC # BLD AUTO: 10.6 10E3/UL (ref 4–11)

## 2021-08-22 PROCEDURE — 250N000013 HC RX MED GY IP 250 OP 250 PS 637: Performed by: PHYSICIAN ASSISTANT

## 2021-08-22 PROCEDURE — 84295 ASSAY OF SERUM SODIUM: CPT | Performed by: INTERNAL MEDICINE

## 2021-08-22 PROCEDURE — 250N000011 HC RX IP 250 OP 636: Performed by: PHYSICIAN ASSISTANT

## 2021-08-22 PROCEDURE — 85610 PROTHROMBIN TIME: CPT | Performed by: PHYSICIAN ASSISTANT

## 2021-08-22 PROCEDURE — 97530 THERAPEUTIC ACTIVITIES: CPT | Mod: GP

## 2021-08-22 PROCEDURE — 82374 ASSAY BLOOD CARBON DIOXIDE: CPT | Performed by: PHYSICIAN ASSISTANT

## 2021-08-22 PROCEDURE — 250N000009 HC RX 250: Performed by: PHYSICIAN ASSISTANT

## 2021-08-22 PROCEDURE — 85014 HEMATOCRIT: CPT | Performed by: PHYSICIAN ASSISTANT

## 2021-08-22 PROCEDURE — 36415 COLL VENOUS BLD VENIPUNCTURE: CPT | Performed by: PHYSICIAN ASSISTANT

## 2021-08-22 PROCEDURE — 87635 SARS-COV-2 COVID-19 AMP PRB: CPT | Performed by: INTERNAL MEDICINE

## 2021-08-22 PROCEDURE — 97110 THERAPEUTIC EXERCISES: CPT | Mod: GP

## 2021-08-22 PROCEDURE — 82533 TOTAL CORTISOL: CPT | Performed by: INTERNAL MEDICINE

## 2021-08-22 PROCEDURE — 120N000001 HC R&B MED SURG/OB

## 2021-08-22 PROCEDURE — 99233 SBSQ HOSP IP/OBS HIGH 50: CPT | Performed by: INTERNAL MEDICINE

## 2021-08-22 PROCEDURE — 36415 COLL VENOUS BLD VENIPUNCTURE: CPT | Performed by: INTERNAL MEDICINE

## 2021-08-22 PROCEDURE — 84295 ASSAY OF SERUM SODIUM: CPT | Performed by: PHYSICIAN ASSISTANT

## 2021-08-22 PROCEDURE — 88305 TISSUE EXAM BY PATHOLOGIST: CPT | Mod: 26 | Performed by: PATHOLOGY

## 2021-08-22 PROCEDURE — 83735 ASSAY OF MAGNESIUM: CPT | Performed by: INTERNAL MEDICINE

## 2021-08-22 RX ORDER — FUROSEMIDE 10 MG/ML
20 INJECTION INTRAMUSCULAR; INTRAVENOUS ONCE
Status: COMPLETED | OUTPATIENT
Start: 2021-08-22 | End: 2021-08-22

## 2021-08-22 RX ADMIN — ASPIRIN 81 MG CHEWABLE TABLET 81 MG: 81 TABLET CHEWABLE at 08:33

## 2021-08-22 RX ADMIN — SENNOSIDES AND DOCUSATE SODIUM 1 TABLET: 8.6; 5 TABLET ORAL at 21:07

## 2021-08-22 RX ADMIN — OXYCODONE HYDROCHLORIDE 5 MG: 5 TABLET ORAL at 12:16

## 2021-08-22 RX ADMIN — OXYCODONE HYDROCHLORIDE 5 MG: 5 TABLET ORAL at 05:52

## 2021-08-22 RX ADMIN — LORAZEPAM 0.5 MG: 0.5 TABLET ORAL at 19:30

## 2021-08-22 RX ADMIN — OXYCODONE HYDROCHLORIDE 5 MG: 5 TABLET ORAL at 18:53

## 2021-08-22 RX ADMIN — MUPIROCIN 0.5 G: 20 OINTMENT TOPICAL at 08:39

## 2021-08-22 RX ADMIN — THIAMINE HCL TAB 100 MG 100 MG: 100 TAB at 08:34

## 2021-08-22 RX ADMIN — DULOXETINE 20 MG: 20 CAPSULE, DELAYED RELEASE ORAL at 09:11

## 2021-08-22 RX ADMIN — THIAMINE HCL TAB 100 MG 100 MG: 100 TAB at 15:12

## 2021-08-22 RX ADMIN — HEPARIN SODIUM 5000 UNITS: 5000 INJECTION INTRAVENOUS; SUBCUTANEOUS at 05:52

## 2021-08-22 RX ADMIN — SENNOSIDES AND DOCUSATE SODIUM 1 TABLET: 8.6; 5 TABLET ORAL at 08:33

## 2021-08-22 RX ADMIN — PANTOPRAZOLE SODIUM 40 MG: 40 TABLET, DELAYED RELEASE ORAL at 08:34

## 2021-08-22 RX ADMIN — THIAMINE HCL TAB 100 MG 100 MG: 100 TAB at 21:07

## 2021-08-22 RX ADMIN — MULTIPLE VITAMINS W/ MINERALS TAB 1 TABLET: TAB at 08:34

## 2021-08-22 RX ADMIN — HEPARIN SODIUM 5000 UNITS: 5000 INJECTION INTRAVENOUS; SUBCUTANEOUS at 21:07

## 2021-08-22 RX ADMIN — FOLIC ACID 1 MG: 1 TABLET ORAL at 08:34

## 2021-08-22 RX ADMIN — FUROSEMIDE 20 MG: 10 INJECTION, SOLUTION INTRAVENOUS at 15:12

## 2021-08-22 RX ADMIN — METOPROLOL TARTRATE 12.5 MG: 25 TABLET, FILM COATED ORAL at 21:07

## 2021-08-22 RX ADMIN — ACETAMINOPHEN 975 MG: 325 TABLET, FILM COATED ORAL at 05:52

## 2021-08-22 RX ADMIN — HEPARIN SODIUM 5000 UNITS: 5000 INJECTION INTRAVENOUS; SUBCUTANEOUS at 13:21

## 2021-08-22 ASSESSMENT — ACTIVITIES OF DAILY LIVING (ADL)
ADLS_ACUITY_SCORE: 18

## 2021-08-22 ASSESSMENT — MIFFLIN-ST. JEOR: SCORE: 1200.06

## 2021-08-22 NOTE — PLAN OF CARE
VSS. Up ind. Tele SR with BBB. Wires and CTs removed today. Oxy for pain. Given lasix, good UO. Incision CDi.

## 2021-08-22 NOTE — PROGRESS NOTES
[facility]    Hospitalist Progress Note    Assessment & Plan   Ms. Lezama is a 55-year-old female with a past medical history significant for known bicuspid aortic valve with aortic stenosis, alcohol use, hypertension and anxiety, who presents to the Emergency Department with shortness of breath, palpitations and found to have critical aortic stenosis.      Acute hypoxic respiratory failure due to acutely decompensated systolic congestive heart failure from critical aortic stenosis:  -s/p Aortic valve replacement with a 23 mm [employer] [place] mechanical valve, intraoperative DVEAUGHN by Dr. Stephanie Montaño, 8/19/21   -acute systolic heart failure secondary to critical aortic stenosis  -  The patient's respiratory status has improved with IV diuresis and she has subsequently been extubated on 8/16   - Cardiology and CV surgery are following and the patient is tentatively scheduled for a surgical aortic valve replacement on Thursday, 08/19/2021, as she is too tenuous to discharge to home.   - Cardiology has recommended against any scheduled diuresis given her tenuous hemodynamics.  -  Echocardiogram as above shows a newly depressed EF of 25-30% with severe aortic stenosis and a calculated valve area of 0.6 cm2, mean gradient of 54 mmHg and peak velocity of 4.4 meters/second.   - The patient will continue to be in the hospital until surgery on Thursday.   - monitor hemodynamics closely as well as her respiratory and volume status.  -Left and right heart cath (8/17): Moderately elevated right and left sided filling pressures, no obstructive CAD.  -s/p Aortic valve replacement with a 23 mm [employer] [place] mechanical valve, intraoperative DEVAUGHN by Dr. Stephanie Montaño, 8/19/21    -transferred to ICU postop, extubated night of surgery  - weaned off pressors. Off oxygen  - post op atrial fibrillation, amiodorone gtt  -off amiodorone  -post op insulin gtt. Now off., good glycemic control  - wean off  oxygen  - good UO. Up 6kg from preop wt. Holding diuretics today. Daily I/O, wt  -discussed coumadin Rx with pt and   - CT to suction  -nl neuro exam  -low Na down to 123 on 8/21 ---> 129 with lasix (wt up 7kg from presurgy wt  - Na 127 today midnight. --->128 this am  -am labs pending  -good UO with lasix 20mg iv 8/21  -still 2kg above preop wt  -had Bm  - feels better with diuresis  INR 3.2 today    Plan:  -from hyponatremia standpoint---> further lasix would likely raise Na further. Discussed with surgery team and renal  Adat, wean oxygen  - coumadin for [facility] aortic valve, goal INR 2-3.   -amiodorone discontinued.   -lasix today per surgery  -CT management per surgery  -coumadin per pharmD  -losartan, metoprolol  -daily wt. I/O  -needs outpatient followupu cardiology for CM and surgery    Acute Hyponatremia - new 8/21  22/ to chf/volume overload  -131 admission.   - 130s-140 periop  -Na 133 yesterday,   - down to 123 0700, stable at 123 at noon today  -given lasix 20mg IV for volume up per surgery, first dose in several days  -placed on fluid restriction  -asymptomatic  -nl neuro status.   - ml yesterday, ~1 range day before, I/O -5.2L  -no clear culprit meds  -no hypotonic fluids.   - unclear etiology  -UNa41 but had lasix  -serum Osmol 268- low  -Uosmol 231.   Given lasix 20mg IV X1 8/21 with 3.8L off and increase in Na to 129---> 127. ---> 128 this am  -etiology 2/2 hypervolemic state per discussion with renal. Renal consult cancelled  -taking some po  -not having much pain.   - asymptomatic  -still 2-3kg above preop wt    Plan;   -daily bmp  - diuresis further today.   - am bmp      Endo:  HbA1C. 5.0  Off post op insulin gtt  -BS on floor 100 range.   ISS for now, qid accucheck,        Non-ST elevation myocardial infarction:   - The patient had an angiogram showing minimal disease at 20% in the LAD and RCA, suggesting this is demand ischemia from the critical aortic stenosis and acutely  decompensated CHF.    -no cad on angiogram 8/17.   - on ASA per surgery    Alcohol use:   - The extent of her drinking is not entirely clear and she has not definitively gone through any alcohol withdrawal though was sedated with propofol, so we will certainly need to continue to monitor.   - There is some concern that she is drinking more than she lets on.  ]  - for now, we will continue with Neurontin, multivitamin, thiamine and folate and monitor closely for evidence of alcohol withdrawal.    - counseled her today on the need to minimize alcohol use going forward and CD counselor has been requested.  -initiated on neurontin on admission.   - no signs etoh w/d, 5 days into hospitalization    -alert. No clear signs etoh w/d currently. Oriented fully  -stop neurontin started on admission for possible etoh w/d  - no etoh w/d  - agrees to meet with chem dep, reordered on 8/21  -pt to start cymbalta at discharge (prescribed by pcp but not started yet.   -follow up pcp  -no ativan       Depression with anxiety colon:   -Cymbalta is on her PTA list, but she has not yet began this  -pt to start cymbalta at discharge (prescribed by pcp but not started yet. Held for now given hyponatremia, restart at discharge.   -follow up pcp  -avoid etoh        Tobacco dependence:  The patient smokes 1 pack of cigarettes per day and was counseled on the need to quit.  She declined nicotine patch at this point.  pcp follow up 1 week      Deep venous thrombosis prophylaxis:  Enoxaparin before surgery. Now being [facility],.  INR at goal    ; dena in place---> discontinued 8/21      Covid: negative pcr. Low suspicion  Not been vaccinated yet  Recommend vaccination after discharage.     Full code. Discussed 8/18    Diet adat per surgery      Disposition:  anticipate discharge home.   -- TBD per surgery  ->2 days         Romeo Watkins MD  Text Page  (7am to 6pm)  Interval History   Na up with  diuresis  Had BM  Feels much better with diuresis  Agrees to start cymbalta at discharge. To see chem dep in am    -Data reviewed today: I reviewed all new labs and imaging results over the last 24 hours. I personally reviewed labs and imaging last 24 hours.     Physical Exam   Temp: 98.5  F (36.9  C) Temp src: Oral BP: 95/50 Pulse: 76   Resp: 16 SpO2: 93 % O2 Device: Nasal cannula Oxygen Delivery: 2 LPM  Vitals:    08/20/21 1749 08/21/21 0609 08/22/21 0239   Weight: 63.2 kg (139 lb 5.3 oz) 63.4 kg (139 lb 11.2 oz) 60.9 kg (134 lb 4.8 oz)     Vital Signs with Ranges  Temp:  [97.7  F (36.5  C)-98.5  F (36.9  C)] 98.5  F (36.9  C)  Pulse:  [72-81] 76  Resp:  [16-18] 16  BP: ()/(50-70) 95/50  SpO2:  [86 %-98 %] 93 %  I/O last 3 completed shifts:  In: 360 [P.O.:360]  Out: 4030 [Urine:3850; Chest Tube:180]    Constitutional: Up in bed, nad  Respiratory: Breathing easily, decreased bases still  Cardiovascular: RRR no r/g. [place] mechanical S2. No r/g/m  Chest: CT in place  GI: soft, nd, nd  Skin/Integumen: no rash or edema  Neuro: fully oriented, alert, coherent.   no tremors, nl speech and mentation  Psych- nl affect. Pleasant, cooperative.        Medications     BETA BLOCKER NOT PRESCRIBED       sodium chloride Stopped (08/20/21 0600)     Warfarin Therapy Reminder         aspirin  81 mg Oral or NG Tube Daily     [Held by provider] DULoxetine  20 mg Oral Daily     folic acid  1 mg Oral Daily     heparin ANTICOAGULANT  5,000 Units Subcutaneous Q8H     insulin aspart  1-7 Units Subcutaneous TID AC     insulin aspart  1-5 Units Subcutaneous At Bedtime     losartan  25 mg Oral Daily     metoprolol tartrate  12.5 mg Oral BID     multivitamin w/minerals  1 tablet Oral Daily     mupirocin  0.5 g Both Nostrils BID     pantoprazole  40 mg Oral or NG Tube Daily    Or     pantoprazole  40 mg Oral Daily     polyethylene glycol  17 g Oral Daily     senna-docusate  1 tablet Oral BID     sodium chloride (PF)  3 mL Intracatheter  Q8H     thiamine  100 mg Oral TID       Data   Recent Labs   Lab 08/22/21  0547 08/22/21  0012 08/21/21  2108 08/21/21  1947 08/21/21  1712 08/21/21  1151 08/21/21  0704 08/20/21  0919 08/20/21  0446 08/19/21  1223 08/17/21  0515 08/16/21  1305 08/15/21  1750 08/15/21  1427   WBC 10.6  --   --   --   --   --  13.0*  --  11.2* 10.4   < >  --    < > 10.6   HGB 10.2*  --   --   --   --   --  9.6*  --  10.2* 11.1*   < >  --    < > 15.1   MCV 97  --   --   --   --   --  96  --  98 99   < >  --    < > 95     --   --   --   --   --  161  --  203 161   < >  --    < > 302   INR 3.21*  --   --   --   --   --  1.84* 1.29*  --  1.28*   < >  --   --   --    NA  --  127*  --  129*  --  123* 123*  --  133 140   < >  --    < > 131*   POTASSIUM  --   --   --   --   --  4.8 4.9  --  4.7 3.7  3.7   < >  --    < > 3.9   CHLORIDE  --   --   --   --   --  94 95  --  105 111*  --   --    < > 103   CO2  --   --   --   --   --  22 22  --  23 24  --   --    < > 20   BUN  --   --   --   --   --  13 14  --  12 8  --   --    < > 10   CR  --   --   --   --   --  0.60 0.64  --  0.67 0.74  --   --    < > 0.72   ANIONGAP  --   --   --   --   --  7 6  --  5 5  --   --    < > 8   KARLY  --   --   --   --   --  8.6 8.1*  --  7.9* 8.1*  --   --    < > 8.8   GLC  --   --  98  --  101* 101* 99  --  118* 163*   < >  --    < > 92   ALBUMIN  --   --   --   --   --   --   --   --  2.9* 2.7*  --   --   --  4.2   PROTTOTAL  --   --   --   --   --   --   --   --  5.3* 5.0*  --   --   --  7.5   BILITOTAL  --   --   --   --   --   --   --   --  0.9 0.5  --   --   --  0.7   ALKPHOS  --   --   --   --   --   --   --   --  59 64  --   --   --  97   ALT  --   --   --   --   --   --   --   --  37 32  --   --   --  39   AST  --   --   --   --   --   --   --   --  65* 55*  --   --   --  39   LIPASE  --   --   --   --   --   --   --   --   --   --   --   --   --  133   TROPONIN  --   --   --   --   --   --   --   --   --   --   --  0.272*  --  0.127*    < > =  values in this interval not displayed.       Imaging:   No results found for this or any previous visit (from the past 24 hour(s)).

## 2021-08-22 NOTE — PROGRESS NOTES
Patient seen and care plan discussed with Dr. Montaño    [facility]  Cardiovascular and Thoracic Surgery Daily Note          Assessment and Plan:   POD#3 s/p Aortic valve replacement with a 23 mm St. Goldy [place] mechanical valve, intraoperative DEVAUGHN by Dr. Stephanie Montaño  -CVS: HR: 70s-80s. SBP: 100s-130s. Pre op EF: 25-30%. ASA, BB with hold parameters, no statin as no CAD. PTA losartan held this am d/t hypotension-- will discontinue and resume as needed for HTN. NSR. Pacer wires capped. Chest tubes to suction. Plan to remove chest tubes and pacer wires today, repeat CXR tomorrow am. Coumadin for mechanical aortic valve. INR: 3.21, INR goal 2-3.  -Resp: Extubated within protocol. Saturating ell on 2L-- wean as able. Continue to encourage IS, cough, deep breathing, ambulation.   -Neuro:  Grossly intact. Pain controlled. Hx of anxiety-- PTA cymbalta resumed.   -Renal: good UOP. Up about 4kg from preoperative weight. Cr: 0.69. Will repeat lasix today and continue to monitor.   -GI:  Tolerating diet despite poor appetite. No BM. Continue bowel regimen  -:  Voiding well on own  -Endo: pre op a1c: 5.0. Minimal insulin infusion requirements. Continue sliding scale insulin.   -FEN: replace electrolytes as needed. Na: 128. K: 4.3. Hyponatremia resolving with diuresis. Continue fluid restriction today  -ID: Temp (24hrs), Av.3  F (36.8  C), Min:97.7  F (36.5  C), Max:98.7  F (37.1  C)  WBC: 10.6<13.0<11.2. Completed perioperative abx. Leukocytosis likely related to stress from surgery. Continue to monitor.   -Heme: Hgb: 10.2. plt: 230. Acute blood loss anemia and thrombocytopenia related to surgery. On coumadin for mechanical oartic valve. INR goal 2-3. INR: 3.21 today.   -Proph: PCD, ASA, BB, PPI, sub q heparin  -Dispo: St. 33. Continue therapies. Chest tubes and pacer wires to be removed today. Plan for repeat CXR tomorrow. Continue to encourage IS, cough, deep breathing, ambulation.           Interval  "History:   No acute events overnight. Saturating well on 2L weaning as able. Pain controlled. Tolerating diet but has very poor appetite. Reports very small BM this am.          Medications:       aspirin  81 mg Oral or NG Tube Daily     DULoxetine  20 mg Oral Daily     folic acid  1 mg Oral Daily     heparin ANTICOAGULANT  5,000 Units Subcutaneous Q8H     insulin aspart  1-7 Units Subcutaneous TID AC     insulin aspart  1-5 Units Subcutaneous At Bedtime     metoprolol tartrate  12.5 mg Oral BID     multivitamin w/minerals  1 tablet Oral Daily     mupirocin  0.5 g Both Nostrils BID     pantoprazole  40 mg Oral or NG Tube Daily    Or     pantoprazole  40 mg Oral Daily     polyethylene glycol  17 g Oral Daily     senna-docusate  1 tablet Oral BID     sodium chloride (PF)  3 mL Intracatheter Q8H     thiamine  100 mg Oral TID     acetaminophen, albuterol, bisacodyl, glucose **OR** dextrose **OR** glucagon, hydrALAZINE, HYDROmorphone **OR** HYDROmorphone, lidocaine 4%, lidocaine (buffered or not buffered), LORazepam, magnesium hydroxide, methocarbamol, naloxone **OR** naloxone **OR** naloxone **OR** naloxone, ondansetron **OR** ondansetron, oxyCODONE **OR** oxyCODONE, BETA BLOCKER NOT PRESCRIBED, sodium chloride (PF), Warfarin Therapy Reminder          Physical Exam:   Vitals were reviewed  Blood pressure 115/59, pulse 87, temperature 98.7  F (37.1  C), temperature source Oral, resp. rate 17, height 1.651 m (5' 5\"), weight 60.9 kg (134 lb 4.8 oz), SpO2 96 %.  Rhythm:NSR    Lungs: diminished bases    Cardiovascular: RRR normal s1 and s2    Abdomen: soft NTND    Extremeties: minimal edema    Incision: CDI    CT: removed today    Weight:   Vitals:    08/18/21 0603 08/20/21 0530 08/20/21 1749 08/21/21 0609   Weight: 58.2 kg (128 lb 3.2 oz) 62.6 kg (138 lb 0.1 oz) 63.2 kg (139 lb 5.3 oz) 63.4 kg (139 lb 11.2 oz)    08/22/21 0239   Weight: 60.9 kg (134 lb 4.8 oz)            Data:   Labs:   Lab Results   Component Value Date    " WBC 10.6 08/22/2021     Lab Results   Component Value Date    RBC 3.18 08/22/2021     Lab Results   Component Value Date    HGB 10.2 08/22/2021     Lab Results   Component Value Date    HCT 30.7 08/22/2021     No components found for: MCT  Lab Results   Component Value Date    MCV 97 08/22/2021     Lab Results   Component Value Date    MCH 32.1 08/22/2021     Lab Results   Component Value Date    MCHC 33.2 08/22/2021     Lab Results   Component Value Date    RDW 12.2 08/22/2021     Lab Results   Component Value Date     08/22/2021       Last Basic Metabolic Panel:  Lab Results   Component Value Date     08/22/2021      Lab Results   Component Value Date    POTASSIUM 4.3 08/22/2021     Lab Results   Component Value Date    CHLORIDE 97 08/22/2021     Lab Results   Component Value Date    KARLY 8.7 08/22/2021     Lab Results   Component Value Date    CO2 27 08/22/2021     Lab Results   Component Value Date    BUN 13 08/22/2021     Lab Results   Component Value Date    CR 0.69 08/22/2021     Lab Results   Component Value Date    GLC 93 08/22/2021         Krystyna Rico PA-C  CV Surgery  Pager # [phone]

## 2021-08-22 NOTE — PLAN OF CARE
VSS. A/O. SBA. 2L O2. . CT & pacer intact. Oxy/tylenol/ativan given. Fluid restricted 1500. Lassiter removed, voiding fine. Tele SR BBB, Na 129, Q6 check.

## 2021-08-22 NOTE — PLAN OF CARE
BP soft. Tele SR with BBB. 2L NC, unable to wean overnight. Lungs diminished, IS encouraged. Sternal incision CDI. Chest tube in place. CMS intact. Voiding in bathroom. Up SBA. Last sodium 127, recheck at 0600.

## 2021-08-23 ENCOUNTER — APPOINTMENT (OUTPATIENT)
Dept: ULTRASOUND IMAGING | Facility: CLINIC | Age: 56
End: 2021-08-23
Attending: PHYSICIAN ASSISTANT
Payer: COMMERCIAL

## 2021-08-23 ENCOUNTER — APPOINTMENT (OUTPATIENT)
Dept: GENERAL RADIOLOGY | Facility: CLINIC | Age: 56
End: 2021-08-23
Attending: PHYSICIAN ASSISTANT
Payer: COMMERCIAL

## 2021-08-23 ENCOUNTER — APPOINTMENT (OUTPATIENT)
Dept: PHYSICAL THERAPY | Facility: CLINIC | Age: 56
End: 2021-08-23
Payer: COMMERCIAL

## 2021-08-23 LAB
ANION GAP SERPL CALCULATED.3IONS-SCNC: 7 MMOL/L (ref 3–14)
BUN SERPL-MCNC: 9 MG/DL (ref 7–30)
CALCIUM SERPL-MCNC: 8.9 MG/DL (ref 8.5–10.1)
CHLORIDE BLD-SCNC: 98 MMOL/L (ref 94–109)
CO2 SERPL-SCNC: 27 MMOL/L (ref 20–32)
CREAT SERPL-MCNC: 0.63 MG/DL (ref 0.52–1.04)
ERYTHROCYTE [DISTWIDTH] IN BLOOD BY AUTOMATED COUNT: 12.3 % (ref 10–15)
GFR SERPL CREATININE-BSD FRML MDRD: >90 ML/MIN/1.73M2
GLUCOSE BLD-MCNC: 91 MG/DL (ref 70–99)
GLUCOSE BLDC GLUCOMTR-MCNC: 102 MG/DL (ref 70–99)
GLUCOSE BLDC GLUCOMTR-MCNC: 118 MG/DL (ref 70–99)
GLUCOSE BLDC GLUCOMTR-MCNC: 84 MG/DL (ref 70–99)
GLUCOSE BLDC GLUCOMTR-MCNC: 99 MG/DL (ref 70–99)
HCT VFR BLD AUTO: 28.3 % (ref 35–47)
HGB BLD-MCNC: 9.6 G/DL (ref 11.7–15.7)
INR PPP: 2.17 (ref 0.85–1.15)
MAGNESIUM SERPL-MCNC: 2.1 MG/DL (ref 1.6–2.3)
MCH RBC QN AUTO: 32.5 PG (ref 26.5–33)
MCHC RBC AUTO-ENTMCNC: 33.9 G/DL (ref 31.5–36.5)
MCV RBC AUTO: 96 FL (ref 78–100)
PHOSPHATE SERPL-MCNC: 3.6 MG/DL (ref 2.5–4.5)
PLATELET # BLD AUTO: 287 10E3/UL (ref 150–450)
POTASSIUM BLD-SCNC: 3.6 MMOL/L (ref 3.4–5.3)
RBC # BLD AUTO: 2.95 10E6/UL (ref 3.8–5.2)
SODIUM SERPL-SCNC: 132 MMOL/L (ref 133–144)
WBC # BLD AUTO: 7.7 10E3/UL (ref 4–11)

## 2021-08-23 PROCEDURE — 85027 COMPLETE CBC AUTOMATED: CPT | Performed by: PHYSICIAN ASSISTANT

## 2021-08-23 PROCEDURE — 250N000013 HC RX MED GY IP 250 OP 250 PS 637: Performed by: SURGERY

## 2021-08-23 PROCEDURE — 97110 THERAPEUTIC EXERCISES: CPT | Mod: GP

## 2021-08-23 PROCEDURE — 0W9B3ZZ DRAINAGE OF LEFT PLEURAL CAVITY, PERCUTANEOUS APPROACH: ICD-10-PCS | Performed by: PHYSICIAN ASSISTANT

## 2021-08-23 PROCEDURE — 85610 PROTHROMBIN TIME: CPT | Performed by: PHYSICIAN ASSISTANT

## 2021-08-23 PROCEDURE — 84100 ASSAY OF PHOSPHORUS: CPT | Performed by: SURGERY

## 2021-08-23 PROCEDURE — 250N000009 HC RX 250: Performed by: PHYSICIAN ASSISTANT

## 2021-08-23 PROCEDURE — 83735 ASSAY OF MAGNESIUM: CPT | Performed by: INTERNAL MEDICINE

## 2021-08-23 PROCEDURE — 99232 SBSQ HOSP IP/OBS MODERATE 35: CPT | Performed by: HOSPITALIST

## 2021-08-23 PROCEDURE — 97530 THERAPEUTIC ACTIVITIES: CPT | Mod: GP

## 2021-08-23 PROCEDURE — 999N000216 HC STATISTIC ADULT CD FACE TO FACE-NO CHRG

## 2021-08-23 PROCEDURE — 71046 X-RAY EXAM CHEST 2 VIEWS: CPT

## 2021-08-23 PROCEDURE — 36415 COLL VENOUS BLD VENIPUNCTURE: CPT | Performed by: PHYSICIAN ASSISTANT

## 2021-08-23 PROCEDURE — 250N000013 HC RX MED GY IP 250 OP 250 PS 637

## 2021-08-23 PROCEDURE — 250N000011 HC RX IP 250 OP 636: Performed by: PHYSICIAN ASSISTANT

## 2021-08-23 PROCEDURE — 272N000706 US THORACENTESIS

## 2021-08-23 PROCEDURE — 80048 BASIC METABOLIC PNL TOTAL CA: CPT | Performed by: INTERNAL MEDICINE

## 2021-08-23 PROCEDURE — 250N000013 HC RX MED GY IP 250 OP 250 PS 637: Performed by: PHYSICIAN ASSISTANT

## 2021-08-23 PROCEDURE — 120N000001 HC R&B MED SURG/OB

## 2021-08-23 PROCEDURE — 999N000154 HC STATISTIC RADIOLOGY XRAY, US, CT, MAR, NM

## 2021-08-23 RX ORDER — POTASSIUM CHLORIDE 750 MG/1
10 TABLET, EXTENDED RELEASE ORAL ONCE
Status: COMPLETED | OUTPATIENT
Start: 2021-08-23 | End: 2021-08-23

## 2021-08-23 RX ORDER — WARFARIN SODIUM 2 MG/1
4 TABLET ORAL
Status: COMPLETED | OUTPATIENT
Start: 2021-08-23 | End: 2021-08-23

## 2021-08-23 RX ADMIN — ASPIRIN 81 MG CHEWABLE TABLET 81 MG: 81 TABLET CHEWABLE at 09:27

## 2021-08-23 RX ADMIN — MUPIROCIN 0.5 G: 20 OINTMENT TOPICAL at 09:32

## 2021-08-23 RX ADMIN — FOLIC ACID 1 MG: 1 TABLET ORAL at 09:27

## 2021-08-23 RX ADMIN — LORAZEPAM 0.5 MG: 0.5 TABLET ORAL at 21:00

## 2021-08-23 RX ADMIN — METOPROLOL TARTRATE 12.5 MG: 25 TABLET, FILM COATED ORAL at 09:27

## 2021-08-23 RX ADMIN — THIAMINE HCL TAB 100 MG 100 MG: 100 TAB at 16:28

## 2021-08-23 RX ADMIN — LORAZEPAM 0.5 MG: 0.5 TABLET ORAL at 14:54

## 2021-08-23 RX ADMIN — LORAZEPAM 0.5 MG: 0.5 TABLET ORAL at 02:29

## 2021-08-23 RX ADMIN — MULTIPLE VITAMINS W/ MINERALS TAB 1 TABLET: TAB at 09:27

## 2021-08-23 RX ADMIN — THIAMINE HCL TAB 100 MG 100 MG: 100 TAB at 09:27

## 2021-08-23 RX ADMIN — HEPARIN SODIUM 5000 UNITS: 5000 INJECTION INTRAVENOUS; SUBCUTANEOUS at 05:57

## 2021-08-23 RX ADMIN — WARFARIN SODIUM 4 MG: 2 TABLET ORAL at 21:00

## 2021-08-23 RX ADMIN — OXYCODONE HYDROCHLORIDE 5 MG: 5 TABLET ORAL at 11:59

## 2021-08-23 RX ADMIN — METOPROLOL TARTRATE 12.5 MG: 25 TABLET, FILM COATED ORAL at 21:00

## 2021-08-23 RX ADMIN — ACETAMINOPHEN 650 MG: 325 TABLET, FILM COATED ORAL at 14:54

## 2021-08-23 RX ADMIN — DULOXETINE 20 MG: 20 CAPSULE, DELAYED RELEASE ORAL at 09:27

## 2021-08-23 RX ADMIN — PANTOPRAZOLE SODIUM 40 MG: 40 TABLET, DELAYED RELEASE ORAL at 09:27

## 2021-08-23 RX ADMIN — POTASSIUM CHLORIDE 10 MEQ: 750 TABLET, EXTENDED RELEASE ORAL at 13:28

## 2021-08-23 RX ADMIN — LORAZEPAM 0.5 MG: 0.5 TABLET ORAL at 08:50

## 2021-08-23 RX ADMIN — OXYCODONE HYDROCHLORIDE 5 MG: 5 TABLET ORAL at 20:00

## 2021-08-23 ASSESSMENT — MIFFLIN-ST. JEOR: SCORE: 1177.88

## 2021-08-23 ASSESSMENT — ACTIVITIES OF DAILY LIVING (ADL)
ADLS_ACUITY_SCORE: 16
ADLS_ACUITY_SCORE: 16
ADLS_ACUITY_SCORE: 18
ADLS_ACUITY_SCORE: 19
ADLS_ACUITY_SCORE: 16
ADLS_ACUITY_SCORE: 16

## 2021-08-23 NOTE — PROGRESS NOTES
1348  Time Out done.    Left sideThoracentesis: Pt tolerated well.  VSS. 375 cc dark bloody fluid removed without difficulty. Bandaid applied to site - CDI.  No CXR post procedure needed per VALERIA Matos.    1400  Pt back to rm 3330-2 per cart & transport. Detailed report called to DEBORAH Smith.

## 2021-08-23 NOTE — PLAN OF CARE
A&Ox4. Anxious at times, PRN ativan given. VSS on 2L NC, attempted to wean from O2. Pt.  88% on RA. Tele: SR with BBB. Denies pain. Regular diet, B, 99. Up IND, denies feeling lightheaded or dizzy, steady gait. Voiding in BR. Sternal incision JOSE, old CT site dressing CDI. CMS intact. Continue to monitor.

## 2021-08-23 NOTE — PROGRESS NOTES
[facility]    Medicine Progress Note - Hospitalist Service       Date of Admission:  8/15/2021    Assessment & Plan           Mayda Lezama is a 55 year old female admitted on 8/15/2021.  past medical history significant for known bicuspid aortic valve with aortic stenosis, alcohol use, hypertension and anxiety, who presents to the Emergency Department with shortness of breath, palpitations and found to have critical aortic stenosis now s/p mechanical AVR 8/19      Acute hypoxic respiratory failure due to acutely decompensated systolic congestive heart failure from critical aortic stenosis, resolving  s/p Aortic valve replacement with a 23 mm St. Goldy [place] mechanical valve, intraoperative DEVAUGHN by Dr. Stephanie Montaño, 8/19/21  Post-op A-fib  Intubated in ED, improved with lasix diuresis and extubated 8/16.  TTE showed newly depressed EF 25-30% with severe aortic stenosis with valve area of 0.6cm2, mean gradient of 54 mmHg.  Left and right heart cath negative for obstructive CAD.  Underwent surgery as noted above with brief period of post-op A-fib and was transiently on amiodarone (now discontinued).   - continue coumadin, PharmD to dose  - wean oxygen as able  - continue aspirin, metoprolol  - daily wt. I/O  - needs outpatient followupu cardiology for CM and surgery  - has not required insulin since gtt removed post-op, will discontinue POC glucose checks and ssi      Hypervolemic hyponatremia  Admission Na 131 normalized with fluids.  Recurrent hyponatremia 8/21 with Na 123, improved with lasix.  Urine studies to assess for SIADH not reliable as had received lasix.   - Na improved to 132, holding lasix per CT Surg  - BMP in AM     Type II NSTEMI  Admission trop slightly elevated, angiogram negative for obstructive CAD as above, suggesting this is demand ischemia from the critical aortic stenosis and acutely decompensated CHF.       Alcohol use:  The extent of her drinking is not entirely  clear, alcohol positive at admission.  No signs of withdrawal this admission.  CD consulted, she is not interested in residential treatment program but did take outpatient CD resources.   - continue multivitamin, thiamine and folate       Depression with anxiety colon:   - continue PTA Cymbalta     Tobacco dependence  Smokes 1 pack of cigarettes per day and was counseled on the need to quit.  She declined nicotine patch at this point.         Diet: Regular Diet Adult  Fluid restriction 1500 ML FLUID    DVT Prophylaxis: Enoxaparin (Lovenox) SQ  Lassiter Catheter: Not present  Central Lines: None  Code Status: Full Code      Disposition Plan   Expected discharge: 08/24/2021 recommended to prior living arrangement once cleared by cardiothoracic surgery.     The patient's care was discussed with the Bedside Nurse and Patient.    Martín Ramirez MD  Hospitalist Service  [facility]  Securely message with the Vocera Web Console (learn more here)  Text page via [facility] Paging/Directory      Clinically Significant Risk Factors Present on Admission                ______________________________________________________________________    Interval History   Reports a headache.  Less dyspnea after thoracentesis today.  No cough, no fever/chills.  Pain is controlled.     Data reviewed today: I reviewed all medications, new labs and imaging results over the last 24 hours. I personally reviewed no images or EKG's today.    Physical Exam   Vital Signs: Temp: 98  F (36.7  C) Temp src: Oral BP: 118/48 Pulse: 80   Resp: 16 SpO2: 95 % O2 Device: Nasal cannula Oxygen Delivery: 2 LPM  Weight: 129 lbs 6.56 oz  General Appearance: Thin female in NAD  Respiratory: mildly diminished left basilar lung sounds, no wheezes or crackles  Cardiovascular: RRR, normal s1/s2 without murmur  GI: abdomen soft, normal bowel sounds  Skin: trace peripheral edema   Other: Alert and appropriate, cranial nerves grossly intact     Data    Recent Labs   Lab 08/23/21  1223 08/23/21  0931 08/23/21  0714 08/22/21  0547 08/22/21  0012 08/21/21  1712 08/21/21  1151 08/21/21  1151 08/21/21  0704 08/20/21  0616 08/20/21  0446 08/19/21  1223   WBC  --   --  7.7 10.6  --   --   --   --  13.0*  --  11.2* 10.4   HGB  --   --  9.6* 10.2*  --   --   --   --  9.6*  --  10.2* 11.1*   MCV  --   --  96 97  --   --   --   --  96  --  98 99   PLT  --   --  287 230  --   --   --   --  161  --  203 161   INR  --   --  2.17* 3.21*  --   --   --   --  1.84*   < >  --  1.28*   NA  --   --  132* 128* 127*   < >  --  123* 123*  --  133 140   POTASSIUM  --   --  3.6 4.3  --   --   --  4.8 4.9  --  4.7 3.7  3.7   CHLORIDE  --   --  98 97  --   --   --  94 95  --  105 111*   CO2  --   --  27 27  --   --   --  22 22  --  23 24   BUN  --   --  9 13  --   --   --  13 14  --  12 8   CR  --   --  0.63 0.69  --   --   --  0.60 0.64  --  0.67 0.74   ANIONGAP  --   --  7 4  --   --   --  7 6  --  5 5   KARLY  --   --  8.9 8.7  --   --   --  8.6 8.1*  --  7.9* 8.1*   GLC 84 118* 91 93  --   --    < > 101* 99  --  118* 163*   ALBUMIN  --   --   --   --   --   --   --   --   --   --  2.9* 2.7*   PROTTOTAL  --   --   --   --   --   --   --   --   --   --  5.3* 5.0*   BILITOTAL  --   --   --   --   --   --   --   --   --   --  0.9 0.5   ALKPHOS  --   --   --   --   --   --   --   --   --   --  59 64   ALT  --   --   --   --   --   --   --   --   --   --  37 32   AST  --   --   --   --   --   --   --   --   --   --  65* 55*    < > = values in this interval not displayed.

## 2021-08-23 NOTE — CONSULTS
8/23/2021      Spoke with pt via telephone to screen for CD services. Pt reports she would not be interested in attending inpatient CD treatment at this time. Pt reports she would be interested in receiving CD resources. Email has been sent to pt with CD resources information. Pt is able to call  Mental Health and Addiction Services Line: [phone] and make an appt through [employer] for an evaluation after she is discharged.     Chikis Vides [facility]  Phone: [phone]  Email; [email or web address]

## 2021-08-23 NOTE — PROGRESS NOTES
Patient seen and care plan discussed with Dr. Montaño    [facility]  Cardiovascular and Thoracic Surgery Daily Note          Assessment and Plan:   POD#4 s/p Aortic valve replacement with a 23 mm St. Goldy [place] mechanical valve, intraoperative DEVAUGHN by Dr. Stephanie Montaño  -CVS: HR: 70s-90s. SBP: 100s-110s. Pre op EF: 25-30%. ASA, BB with hold parameters, no statin as no CAD. PTA losartan on hold-- plan to resume if BP allows. NSR. Chest tubes and pacer wires removed yesterday without issue. CXR this am with left pleural effusion. Lifelong coumadin for mechanical aortic valve with INR goal 2-3. INR: 2.17 today.   -Resp: Extubated within protocol. Saturating well on 2L-- wean as able. Continue to encourage IS, cough, deep breathing, ambulation. Left pleural effusion-- plan for left thoracentesis   -Neuro:  Grossly intact. Pain controlled. Hx of anxiety-- PTA cymbalta resumed  -Renal: good UOP. Up about 2kg from preoperative weight. Cr: 0.63. Will hold on diuretics today.   -GI:  Tolerating diet despite poor appetite. +BM. Continue bowel regimen  -:  Voiding well on own.   -Endo: pre op a1c: 5.0. Completed insulin infusion. Continue sliding scale insulin  -FEN: replace electrolytes as needed. Na: 132. K: 3.6  Orders Placed This Encounter      Regular Diet Adult    -ID: Temp (24hrs), Av.3  F (36.8  C), Min:97.8  F (36.6  C), Max:98.8  F (37.1  C)  WBC: 7.7<10.6<13.0<11.2. completed perioperative abx. Leukocytosis likely related to stress from surgery. Continue to monitor.   -Heme: Hgb: 9.6. plt: 287. Acute blood loss anemia and thrombocytopenia related to surgery. On coumadin for mechanical aortic valve. INR goal 2-3. INR: 2.17 today.   -Proph: PCD, ASA, BB, PPI, coumadin  -Dispo: St. 33. Continue therapies. CXR with left pleural effusion, left thoracentesis ordered. Continue to encourage IS, cough, deep breathing, ambulation today.           Interval History:   No acute events overnight.  "Saturating well on 2L, weaning as able. Pain controlled. Tolerating diet-- still has poor appetite. +BM         Medications:       aspirin  81 mg Oral or NG Tube Daily     DULoxetine  20 mg Oral Daily     folic acid  1 mg Oral Daily     insulin aspart  1-7 Units Subcutaneous TID AC     insulin aspart  1-5 Units Subcutaneous At Bedtime     metoprolol tartrate  12.5 mg Oral BID     multivitamin w/minerals  1 tablet Oral Daily     mupirocin  0.5 g Both Nostrils BID     pantoprazole  40 mg Oral or NG Tube Daily    Or     pantoprazole  40 mg Oral Daily     polyethylene glycol  17 g Oral Daily     senna-docusate  1 tablet Oral BID     sodium chloride (PF)  3 mL Intracatheter Q8H     thiamine  100 mg Oral TID     warfarin ANTICOAGULANT  4 mg Oral ONCE at 18:00     acetaminophen, albuterol, bisacodyl, glucose **OR** dextrose **OR** glucagon, hydrALAZINE, HYDROmorphone **OR** HYDROmorphone, lidocaine 4%, lidocaine (buffered or not buffered), LORazepam, magnesium hydroxide, - MEDICATION INSTRUCTIONS -, methocarbamol, naloxone **OR** naloxone **OR** naloxone **OR** naloxone, ondansetron **OR** ondansetron, oxyCODONE **OR** oxyCODONE, BETA BLOCKER NOT PRESCRIBED, sodium chloride (PF), Warfarin Therapy Reminder          Physical Exam:   Vitals were reviewed  Blood pressure 102/45, pulse 88, temperature 97.9  F (36.6  C), temperature source Oral, resp. rate 16, height 1.651 m (5' 5\"), weight 58.7 kg (129 lb 6.6 oz), SpO2 96 %.  Rhythm: NSR    Lungs: diminished bases     Cardiovascular: RRR normal s1 and s2    Abdomen: soft NTND    Extremeties: minimal edema    Incision: CDI    CT: n/a    Weight:   Vitals:    08/20/21 0530 08/20/21 1749 08/21/21 0609 08/22/21 0239   Weight: 62.6 kg (138 lb 0.1 oz) 63.2 kg (139 lb 5.3 oz) 63.4 kg (139 lb 11.2 oz) 60.9 kg (134 lb 4.8 oz)    08/23/21 0630   Weight: 58.7 kg (129 lb 6.6 oz)            Data:   Labs:   Lab Results   Component Value Date    WBC 7.7 08/23/2021     Lab Results   Component " Value Date    RBC 2.95 08/23/2021     Lab Results   Component Value Date    HGB 9.6 08/23/2021     Lab Results   Component Value Date    HCT 28.3 08/23/2021     No components found for: MCT  Lab Results   Component Value Date    MCV 96 08/23/2021     Lab Results   Component Value Date    MCH 32.5 08/23/2021     Lab Results   Component Value Date    MCHC 33.9 08/23/2021     Lab Results   Component Value Date    RDW 12.3 08/23/2021     Lab Results   Component Value Date     08/23/2021       Last Basic Metabolic Panel:  Lab Results   Component Value Date     08/23/2021      Lab Results   Component Value Date    POTASSIUM 3.6 08/23/2021     Lab Results   Component Value Date    CHLORIDE 98 08/23/2021     Lab Results   Component Value Date    KARLY 8.9 08/23/2021     Lab Results   Component Value Date    CO2 27 08/23/2021     Lab Results   Component Value Date    BUN 9 08/23/2021     Lab Results   Component Value Date    CR 0.63 08/23/2021     Lab Results   Component Value Date     08/23/2021    GLC 91 08/23/2021       CXR: 8/23/21    IMPRESSION: Right IJ Loda-Kendy catheter and chest tubes have been  removed. New small bilateral pleural effusions and mild bibasilar  atelectasis. No pneumothorax. Median sternotomy and aortic valvular  prosthesis. Normal heart size.    Krystyna Rico PA-C  CV Surgery  Pager # [phone]

## 2021-08-23 NOTE — PLAN OF CARE
"VSS.  Tele= SR.  Pt is up independently in the room.  Sternal incision is C/D/I.  Thoracentesis completed this shift and the band aid is C/D/I. Pt states she feels \" better\" after the procedure.  Pt anxious this shift, ativan administered x 2 this shift per MD orders, relief noted.  Pt C/O headache, tylenol administered per MD orders and relief noted.  "

## 2021-08-24 ENCOUNTER — APPOINTMENT (OUTPATIENT)
Dept: PHYSICAL THERAPY | Facility: CLINIC | Age: 56
End: 2021-08-24
Payer: COMMERCIAL

## 2021-08-24 VITALS
RESPIRATION RATE: 16 BRPM | DIASTOLIC BLOOD PRESSURE: 76 MMHG | HEART RATE: 79 BPM | BODY MASS INDEX: 21.45 KG/M2 | HEIGHT: 65 IN | OXYGEN SATURATION: 96 % | WEIGHT: 128.75 LBS | TEMPERATURE: 98.5 F | SYSTOLIC BLOOD PRESSURE: 122 MMHG

## 2021-08-24 PROBLEM — Z95.4 S/P AORTIC VALVE REPLACEMENT WITH METALLIC VALVE: Status: ACTIVE | Noted: 2021-08-24

## 2021-08-24 PROBLEM — R73.9 TRANSIENT HYPERGLYCEMIA POST PROCEDURE: Status: ACTIVE | Noted: 2021-08-24

## 2021-08-24 PROBLEM — F10.10 ALCOHOL ABUSE: Status: ACTIVE | Noted: 2021-08-24

## 2021-08-24 LAB
ANION GAP SERPL CALCULATED.3IONS-SCNC: 6 MMOL/L (ref 3–14)
BUN SERPL-MCNC: 9 MG/DL (ref 7–30)
CALCIUM SERPL-MCNC: 8.7 MG/DL (ref 8.5–10.1)
CHLORIDE BLD-SCNC: 99 MMOL/L (ref 94–109)
CO2 SERPL-SCNC: 27 MMOL/L (ref 20–32)
CREAT SERPL-MCNC: 0.63 MG/DL (ref 0.52–1.04)
ERYTHROCYTE [DISTWIDTH] IN BLOOD BY AUTOMATED COUNT: 12.3 % (ref 10–15)
GFR SERPL CREATININE-BSD FRML MDRD: >90 ML/MIN/1.73M2
GLUCOSE BLD-MCNC: 88 MG/DL (ref 70–99)
HCT VFR BLD AUTO: 27.5 % (ref 35–47)
HGB BLD-MCNC: 9.2 G/DL (ref 11.7–15.7)
INR PPP: 1.96 (ref 0.85–1.15)
MAGNESIUM SERPL-MCNC: 2 MG/DL (ref 1.6–2.3)
MCH RBC QN AUTO: 32.3 PG (ref 26.5–33)
MCHC RBC AUTO-ENTMCNC: 33.5 G/DL (ref 31.5–36.5)
MCV RBC AUTO: 97 FL (ref 78–100)
PHOSPHATE SERPL-MCNC: 3.8 MG/DL (ref 2.5–4.5)
PLATELET # BLD AUTO: 325 10E3/UL (ref 150–450)
POTASSIUM BLD-SCNC: 3.6 MMOL/L (ref 3.4–5.3)
RBC # BLD AUTO: 2.85 10E6/UL (ref 3.8–5.2)
SODIUM SERPL-SCNC: 132 MMOL/L (ref 133–144)
WBC # BLD AUTO: 6 10E3/UL (ref 4–11)

## 2021-08-24 PROCEDURE — 36415 COLL VENOUS BLD VENIPUNCTURE: CPT | Performed by: PHYSICIAN ASSISTANT

## 2021-08-24 PROCEDURE — 97110 THERAPEUTIC EXERCISES: CPT | Mod: GP

## 2021-08-24 PROCEDURE — 85027 COMPLETE CBC AUTOMATED: CPT | Performed by: PHYSICIAN ASSISTANT

## 2021-08-24 PROCEDURE — 250N000013 HC RX MED GY IP 250 OP 250 PS 637: Performed by: PHYSICIAN ASSISTANT

## 2021-08-24 PROCEDURE — 250N000013 HC RX MED GY IP 250 OP 250 PS 637

## 2021-08-24 PROCEDURE — 83735 ASSAY OF MAGNESIUM: CPT | Performed by: SURGERY

## 2021-08-24 PROCEDURE — 99232 SBSQ HOSP IP/OBS MODERATE 35: CPT | Performed by: HOSPITALIST

## 2021-08-24 PROCEDURE — 80048 BASIC METABOLIC PNL TOTAL CA: CPT | Performed by: PHYSICIAN ASSISTANT

## 2021-08-24 PROCEDURE — 85610 PROTHROMBIN TIME: CPT | Performed by: PHYSICIAN ASSISTANT

## 2021-08-24 PROCEDURE — 250N000011 HC RX IP 250 OP 636: Performed by: PHYSICIAN ASSISTANT

## 2021-08-24 PROCEDURE — 84100 ASSAY OF PHOSPHORUS: CPT | Performed by: SURGERY

## 2021-08-24 PROCEDURE — 99207 PR CDG-MDM COMPONENT: MEETS MODERATE - UP CODED: CPT | Performed by: HOSPITALIST

## 2021-08-24 RX ORDER — AMOXICILLIN 250 MG
1 CAPSULE ORAL 2 TIMES DAILY PRN
COMMUNITY
Start: 2021-08-24 | End: 2021-09-08

## 2021-08-24 RX ORDER — WARFARIN SODIUM 5 MG/1
5 TABLET ORAL DAILY
Qty: 30 TABLET | Refills: 3 | Status: SHIPPED | OUTPATIENT
Start: 2021-08-25

## 2021-08-24 RX ORDER — ASPIRIN 81 MG/1
81 TABLET, CHEWABLE ORAL DAILY
Qty: 30 TABLET | Refills: 3 | Status: SHIPPED | OUTPATIENT
Start: 2021-08-25

## 2021-08-24 RX ORDER — METHOCARBAMOL 500 MG/1
500 TABLET, FILM COATED ORAL EVERY 6 HOURS PRN
Qty: 20 TABLET | Refills: 0 | Status: SHIPPED | OUTPATIENT
Start: 2021-08-24 | End: 2021-09-08

## 2021-08-24 RX ORDER — FUROSEMIDE 10 MG/ML
20 INJECTION INTRAMUSCULAR; INTRAVENOUS ONCE
Status: COMPLETED | OUTPATIENT
Start: 2021-08-24 | End: 2021-08-24

## 2021-08-24 RX ORDER — OXYCODONE HYDROCHLORIDE 5 MG/1
5 TABLET ORAL EVERY 6 HOURS PRN
Qty: 20 TABLET | Refills: 0 | Status: SHIPPED | OUTPATIENT
Start: 2021-08-24 | End: 2021-09-08

## 2021-08-24 RX ORDER — POLYETHYLENE GLYCOL 3350 17 G/17G
17 POWDER, FOR SOLUTION ORAL DAILY PRN
COMMUNITY
Start: 2021-08-24 | End: 2021-09-08

## 2021-08-24 RX ORDER — LORAZEPAM 0.5 MG/1
0.5 TABLET ORAL EVERY 6 HOURS PRN
Qty: 10 TABLET | Refills: 0 | Status: SHIPPED | OUTPATIENT
Start: 2021-08-24

## 2021-08-24 RX ORDER — WARFARIN SODIUM 7.5 MG/1
7.5 TABLET ORAL ONCE
Status: COMPLETED | OUTPATIENT
Start: 2021-08-24 | End: 2021-08-24

## 2021-08-24 RX ORDER — ACETAMINOPHEN 325 MG/1
650 TABLET ORAL EVERY 6 HOURS PRN
Qty: 60 TABLET | Refills: 0 | Status: SHIPPED | OUTPATIENT
Start: 2021-08-24

## 2021-08-24 RX ORDER — PANTOPRAZOLE SODIUM 40 MG/1
40 TABLET, DELAYED RELEASE ORAL DAILY
Qty: 14 TABLET | Refills: 0 | Status: SHIPPED | OUTPATIENT
Start: 2021-08-25 | End: 2021-09-08

## 2021-08-24 RX ORDER — METOPROLOL TARTRATE 25 MG/1
12.5 TABLET, FILM COATED ORAL 2 TIMES DAILY
Qty: 30 TABLET | Refills: 3 | Status: SHIPPED | OUTPATIENT
Start: 2021-08-24 | End: 2021-12-21

## 2021-08-24 RX ADMIN — WARFARIN SODIUM 7.5 MG: 7.5 TABLET ORAL at 12:54

## 2021-08-24 RX ADMIN — FOLIC ACID 1 MG: 1 TABLET ORAL at 08:27

## 2021-08-24 RX ADMIN — OXYCODONE HYDROCHLORIDE 5 MG: 5 TABLET ORAL at 08:36

## 2021-08-24 RX ADMIN — MULTIPLE VITAMINS W/ MINERALS TAB 1 TABLET: TAB at 08:27

## 2021-08-24 RX ADMIN — METOPROLOL TARTRATE 12.5 MG: 25 TABLET, FILM COATED ORAL at 08:27

## 2021-08-24 RX ADMIN — FUROSEMIDE 20 MG: 10 INJECTION, SOLUTION INTRAVENOUS at 08:27

## 2021-08-24 RX ADMIN — THIAMINE HCL TAB 100 MG 100 MG: 100 TAB at 08:27

## 2021-08-24 RX ADMIN — PANTOPRAZOLE SODIUM 40 MG: 40 TABLET, DELAYED RELEASE ORAL at 08:26

## 2021-08-24 RX ADMIN — LORAZEPAM 0.5 MG: 0.5 TABLET ORAL at 10:56

## 2021-08-24 RX ADMIN — ASPIRIN 81 MG CHEWABLE TABLET 81 MG: 81 TABLET CHEWABLE at 08:27

## 2021-08-24 RX ADMIN — DULOXETINE 20 MG: 20 CAPSULE, DELAYED RELEASE ORAL at 08:27

## 2021-08-24 RX ADMIN — LORAZEPAM 0.5 MG: 0.5 TABLET ORAL at 03:22

## 2021-08-24 ASSESSMENT — ACTIVITIES OF DAILY LIVING (ADL)
ADLS_ACUITY_SCORE: 16

## 2021-08-24 ASSESSMENT — MIFFLIN-ST. JEOR: SCORE: 1174.88

## 2021-08-24 NOTE — PLAN OF CARE
Physical Therapy Discharge Summary    Reason for therapy discharge:    Discharged to home with outpatient therapy.    Progress towards therapy goal(s). See goals on Care Plan in [facility] electronic health record for goal details.  Goals partially met.  Barriers to achieving goals:   discharge from facility.    Therapy recommendation(s):    Continued therapy is recommended.  Rationale/Recommendations:  For further cardiac education and endurance training.

## 2021-08-24 NOTE — PROGRESS NOTES
Patient seen and discussed with Dr. Montaño     CV Surgery    S: No acute events overnight. Saturating well on room air. Pain controlled. Tolerating diet. +BM    O: B/P: 123/61, T: 98.5, P: 73, R: 16  General: NAD   Heart: RRR normal s1 and s2  Lungs: diminished bases  Abd: soft NTND  Sternum: CDI  Extremities: minimal edema    Lab Results   Component Value Date    WBC 6.0 08/24/2021     Lab Results   Component Value Date    RBC 2.85 08/24/2021     Lab Results   Component Value Date    HGB 9.2 08/24/2021     Lab Results   Component Value Date    HCT 27.5 08/24/2021     No components found for: MCT  Lab Results   Component Value Date    MCV 97 08/24/2021     Lab Results   Component Value Date    MCH 32.3 08/24/2021     Lab Results   Component Value Date    MCHC 33.5 08/24/2021     Lab Results   Component Value Date    RDW 12.3 08/24/2021     Lab Results   Component Value Date     08/24/2021       Last Basic Metabolic Panel:  Lab Results   Component Value Date     08/24/2021      Lab Results   Component Value Date    POTASSIUM 3.6 08/24/2021     Lab Results   Component Value Date    CHLORIDE 99 08/24/2021     Lab Results   Component Value Date    KARLY 8.7 08/24/2021     Lab Results   Component Value Date    CO2 27 08/24/2021     Lab Results   Component Value Date    BUN 9 08/24/2021     Lab Results   Component Value Date    CR 0.63 08/24/2021     Lab Results   Component Value Date    GLC 88 08/24/2021       A/P: POD#5 s/p Aortic valve replacement with a 23 mm St. Goldy [place] mechanical valve, intraoperative DEVAUGHN by Dr. Stephanie Montaño    INR: 1.96  Stable for discharge to home today    Krystyna Rico PA-C  Pager [phone]

## 2021-08-24 NOTE — PROGRESS NOTES
Pt given medications from pharmacy. Green slip signed. Pt discharged from facility with belongings.

## 2021-08-24 NOTE — PROGRESS NOTES
[facility]    Medicine Progress Note - Hospitalist Service       Date of Admission:  8/15/2021    Assessment & Plan           Mayda Lezama is a 55 year old female admitted on 8/15/2021.  past medical history significant for known bicuspid aortic valve with aortic stenosis, alcohol use, hypertension and anxiety, who presents to the Emergency Department with shortness of breath, palpitations and found to have critical aortic stenosis now s/p mechanical AVR 8/19      Acute hypoxic respiratory failure due to acutely decompensated systolic congestive heart failure from critical aortic stenosis, resolved  s/p Aortic valve replacement with a 23 mm [employer] [place] mechanical valve, intraoperative DEVAUGHN by Dr. Stephanie Montaño, 8/19/21  Post-op A-fib, resolved  Intubated in ED, improved with lasix diuresis and extubated 8/16.  TTE showed newly depressed EF 25-30% with severe aortic stenosis with valve area of 0.6cm2, mean gradient of 54 mmHg.  Left and right heart cath negative for obstructive CAD.  Underwent surgery as noted above with brief period of post-op A-fib and was transiently on amiodarone (now discontinued).   - continue coumadin, PharmD to dose  - currently no room air  - continue aspirin, metoprolol; discussed with CT Surg, will consider ACE/ARB in outpatient follow up     Hypervolemic hyponatremia  Admission Na 131 normalized with fluids.  Recurrent hyponatremia 8/21 with Na 123, improved with lasix.  Urine studies to assess for SIADH not reliable as had received lasix.   - Na stable at 132, defer diuretics to CT Surg     Type II NSTEMI  Admission trop slightly elevated, angiogram negative for obstructive CAD as above, suggesting this is demand ischemia from the critical aortic stenosis and acutely decompensated CHF.       Alcohol use:  The extent of her drinking is not entirely clear, alcohol positive at admission.  No signs of withdrawal this admission.  CD consulted, she is not  interested in residential treatment program but did take outpatient CD resources.   - continue multivitamin, thiamine and folate       Depression with anxiety colon:   - continue PTA Cymbalta     Tobacco dependence  Smokes 1 pack of cigarettes per day and was counseled on the need to quit.  She declined nicotine patch at this point.         Diet: Regular Diet Adult  Fluid restriction 1500 ML FLUID  Diet    DVT Prophylaxis: Enoxaparin (Lovenox) SQ  Lassiter Catheter: Not present  Central Lines: None  Code Status: Full Code      Disposition Plan   Expected discharge: 08/24/2021 recommended to prior living arrangement once cleared by cardiothoracic surgery.     The patient's care was discussed with the Bedside Nurse, Patient and Patient's Family.    Martín Ramirez MD  Hospitalist Service  [facility]  Securely message with the Vocera Web Console (learn more here)  Text page via [employer] Paging/Directory      Clinically Significant Risk Factors Present on Admission                ______________________________________________________________________    Interval History   She is feeling well, denies any dyspnea.  Pain is controlled.  No fever/chills or other complaints.     Data reviewed today: I reviewed all medications, new labs and imaging results over the last 24 hours. I personally reviewed no images or EKG's today.    Physical Exam   Vital Signs: Temp: 98.5  F (36.9  C) Temp src: Oral BP: 122/76 (Post CR) Pulse: 79   Resp: 16 SpO2: 96 % O2 Device: None (Room air) Oxygen Delivery: 1 LPM  Weight: 128 lbs 11.98 oz  General Appearance: Thin female in NAD, sitting up in bed  Respiratory: anterior lung sounds clear, no wheezes or crackles  Cardiovascular: RRR, normal s1/s2 without murmur  GI: abdomen soft, normal bowel sounds  Skin: trace peripheral edema   Other: Alert and appropriate, cranial nerves grossly intact     Data   Recent Labs   Lab 08/24/21  0645 08/23/21  1635 08/23/21  1223 08/23/21  0714  08/22/21  0547 08/20/21  0616 08/20/21  0446 08/19/21  1223   WBC 6.0  --   --  7.7 10.6   < > 11.2* 10.4   HGB 9.2*  --   --  9.6* 10.2*   < > 10.2* 11.1*   MCV 97  --   --  96 97   < > 98 99     --   --  287 230   < > 203 161   INR 1.96*  --   --  2.17* 3.21*   < >  --  1.28*   *  --   --  132* 128*   < > 133 140   POTASSIUM 3.6  --   --  3.6 4.3   < > 4.7 3.7  3.7   CHLORIDE 99  --   --  98 97   < > 105 111*   CO2 27  --   --  27 27   < > 23 24   BUN 9  --   --  9 13   < > 12 8   CR 0.63  --   --  0.63 0.69   < > 0.67 0.74   ANIONGAP 6  --   --  7 4   < > 5 5   KARLY 8.7  --   --  8.9 8.7   < > 7.9* 8.1*   GLC 88 102* 84 91 93   < > 118* 163*   ALBUMIN  --   --   --   --   --   --  2.9* 2.7*   PROTTOTAL  --   --   --   --   --   --  5.3* 5.0*   BILITOTAL  --   --   --   --   --   --  0.9 0.5   ALKPHOS  --   --   --   --   --   --  59 64   ALT  --   --   --   --   --   --  37 32   AST  --   --   --   --   --   --  65* 55*    < > = values in this interval not displayed.

## 2021-08-24 NOTE — PLAN OF CARE
Pt A+Ox4. VSS on RA. Denies SOB. Denies Nausea. Voiding, good output. Tele SR with BBB. Pain well controlled using PRN medication. Anxiety controlled using PRN ativan. Moving well IND. Verbalizes understanding of sternal precautions. Sternal incision JOSE, Intact. Chest tube sites, dressings intact. Plan for pt to discharge. Coumadin dose given prior to discharge per MD request. AVS reviewed with pt and . Copy of AVS given to pt and signed copy placed in chart. IV removed. Waiting on discharge pharmacy to send medications. Will continue to monitor.

## 2021-08-24 NOTE — DISCHARGE SUMMARY
"Discharge Summary    Mayda Lezama MRN# [id number]   YOB: 1965 Age: 55 year old     Date of Admission:  8/15/2021  Date of Discharge:  8/24/2021  2:01 PM  Admitting Physician:  No admitting provider for patient encounter.  Discharge Physician:  Dr. Stephanie Montaño  Discharging Service:  Cardiovascular and Thoracic Surgery     Home clinic:    [facility] [address] [place] [address]     Primary Phone: [phone] Primary Fax: [phone]       Primary Provider: Alaina Griffiths          Admission Diagnoses:   Alcohol abuse [F10.10]  Acute respiratory failure with hypoxia (H) [J96.01]  Alcohol intoxication, with unspecified complication (H) [F10.929]  Pneumonia of both lungs due to infectious organism, unspecified part of lung [J18.9]  Acute congestive heart failure, unspecified heart failure type (H) [I50.9]          Discharge Diagnosis:   Patient Active Problem List   Diagnosis     Acute respiratory failure with hypoxia (H)     Acute congestive heart failure, unspecified heart failure type (H)     Bicuspid aortic valve     Aortic valve stenosis     Alcohol abuse     S/P aortic valve replacement with metallic valve     Transient hyperglycemia post procedure                Discharge Disposition:   Discharged to home           Condition on Discharge:   Discharge condition: Stable   Discharge vitals: Blood pressure 122/76, pulse 79, temperature 98.5  F (36.9  C), temperature source Oral, resp. rate 16, height 1.651 m (5' 5\"), weight 58.4 kg (128 lb 12 oz), SpO2 96 %.     Code status on discharge: Full Code           Procedures:   On 8/19/21, Mayda Lezama underwent successful Aortic valve replacement with a 23 mm [employer] [place] mechanical valve, intraoperative DEVAUGHN by Dr. Stephanie Montaño          Medications Prior to Admission:     Medications Prior to Admission   Medication Sig Dispense Refill Last Dose     DULoxetine (CYMBALTA) 20 MG capsule Take 20 mg by mouth daily     at has not " started yet     [DISCONTINUED] LORazepam (ATIVAN) 0.5 MG tablet Take 0.5 mg by mouth every 6 hours as needed for anxiety (or flying)     at PRN     [DISCONTINUED] losartan (COZAAR) 50 MG tablet Take 50 mg by mouth daily    at has not started yet             Discharge Medications:     Current Discharge Medication List      START taking these medications    Details   acetaminophen (TYLENOL) 325 MG tablet Take 2 tablets (650 mg) by mouth every 6 hours as needed for mild pain, fever or other (For optimal non-opioid multimodal pain management to improve pain control.)  Qty: 60 tablet, Refills: 0    Associated Diagnoses: S/P aortic valve replacement with metallic valve      aspirin (ASA) 81 MG chewable tablet 1 tablet (81 mg) by Oral or NG Tube route daily  Qty: 30 tablet, Refills: 3    Associated Diagnoses: S/P aortic valve replacement with metallic valve      methocarbamol (ROBAXIN) 500 MG tablet Take 1 tablet (500 mg) by mouth every 6 hours as needed for muscle spasms  Qty: 20 tablet, Refills: 0    Associated Diagnoses: S/P aortic valve replacement with metallic valve      metoprolol tartrate (LOPRESSOR) 25 MG tablet Take 0.5 tablets (12.5 mg) by mouth 2 times daily  Qty: 30 tablet, Refills: 3    Associated Diagnoses: S/P aortic valve replacement with metallic valve      oxyCODONE (ROXICODONE) 5 MG tablet Take 1 tablet (5 mg) by mouth every 6 hours as needed for moderate to severe pain  Qty: 20 tablet, Refills: 0    Associated Diagnoses: S/P aortic valve replacement with metallic valve      pantoprazole (PROTONIX) 40 MG EC tablet Take 1 tablet (40 mg) by mouth daily for 14 days  Qty: 14 tablet, Refills: 0    Associated Diagnoses: S/P aortic valve replacement with metallic valve      polyethylene glycol (MIRALAX) 17 g packet Take 17 g by mouth daily as needed for constipation    Associated Diagnoses: S/P aortic valve replacement with metallic valve      senna-docusate (SENOKOT-S/PERICOLACE) 8.6-50 MG tablet Take 1  tablet by mouth 2 times daily as needed for constipation    Associated Diagnoses: S/P aortic valve replacement with metallic valve      warfarin ANTICOAGULANT (COUMADIN) 5 MG tablet Take 1 tablet (5 mg) by mouth daily  Qty: 30 tablet, Refills: 3    Associated Diagnoses: S/P aortic valve replacement with metallic valve         CONTINUE these medications which have CHANGED    Details   LORazepam (ATIVAN) 0.5 MG tablet Take 1 tablet (0.5 mg) by mouth every 6 hours as needed for anxiety (or flying)  Qty: 10 tablet, Refills: 0    Associated Diagnoses: S/P aortic valve replacement with metallic valve         CONTINUE these medications which have NOT CHANGED    Details   DULoxetine (CYMBALTA) 20 MG capsule Take 20 mg by mouth daily          STOP taking these medications       losartan (COZAAR) 50 MG tablet Comments:   Reason for Stopping:                     Consultations:   Nutrition, Intensivist, Hospitalist, Chemical dependency             Brief History of Illness:   Ms. Lezama is a very pleasant 55-year-old otherwise healthy female with a known bicuspid valve and aortic stenosis, who was admitted to the hospital several days ago with congestive heart failure in acute respiratory failure requiring mechanical ventilator support.  She was extubated a couple days ago.  Preoperative coronary angiogram demonstrated no significant coronary artery disease.  She was taken to the operating room today for aortic valve replacement with a mechanical valve.          Hospital Course:   On 8/19/21, Mayda Lezama underwent successful Aortic valve replacement with a 23 mm [place] mechanical valve, intraoperative DEVAUGHN by Dr. Stephanie Montaño    She was extubated within protocol and once weaned from hemodynamic stabilizing infusions was transferred to the surgical telemetry floor. Her chest tubes and pacing wires were removed when appropriate and post removal CXR stable.     She has a mechanical aortic valve and was started on  coumadin immediately postoperatively and will continue coumadin lifelong with an INR goal 2-3. At discharge her INR was 1.96 and had trended down from the previous day so was given 7.5mg prior to discharge, with an anticipated dose of 5mg daily as an outpatient. Follow up for INR management arranged for tomorrow and dosing may be further adjusted at that time. Patient instructed to call our office with subtherapeutic INRs as she may need to be readmitted for heparin bridging.    She has history of alcohol abuse and was monitored closely for alcohol withdrawal. She has history of anxiety as well and PTA cymbalta and ativan prescribed.    She was fluid overloaded and treated with diuretic therapies. At discharge she remains up about 2kg form preoperative weight and was given additional IV lasix prior to discharge. No further diuretic therapies needed at discharge.    She was transiently hyperglycemic and treated with insulin infusion then transitioned to sliding scale insulin per protocol. Her blood sugars remained stable and no further glycemic control agents were needed at discharge.     She had a left pleural effusion and underwent left thoracentesis on POD#4 for removal of 375ml. She was weaned off oxygen without issue.    She has worked well with therapies, had return of bowel function and INR therapeutic. Attempted to resume losartan during inpatient but her blood pressure did not tolerate, will re-evaluate as an outpatient and resume as able. She is stable for discharge to home with the help of her . Follow up with cardiac rehab, cardiac surgery, cardiology and INR follow up arranged. Discharge teaching provided to patient and spouse.             Significant Results:   Lab Results   Component Value Date    WBC 6.0 08/24/2021     Lab Results   Component Value Date    RBC 2.85 08/24/2021     Lab Results   Component Value Date    HGB 9.2 08/24/2021     Lab Results   Component Value Date    HCT 27.5  08/24/2021     No components found for: MCT  Lab Results   Component Value Date    MCV 97 08/24/2021     Lab Results   Component Value Date    MCH 32.3 08/24/2021     Lab Results   Component Value Date    MCHC 33.5 08/24/2021     Lab Results   Component Value Date    RDW 12.3 08/24/2021     Lab Results   Component Value Date     08/24/2021       Last Basic Metabolic Panel:  Lab Results   Component Value Date     08/24/2021      Lab Results   Component Value Date    POTASSIUM 3.6 08/24/2021     Lab Results   Component Value Date    CHLORIDE 99 08/24/2021     Lab Results   Component Value Date    KARLY 8.7 08/24/2021     Lab Results   Component Value Date    CO2 27 08/24/2021     Lab Results   Component Value Date    BUN 9 08/24/2021     Lab Results   Component Value Date    CR 0.63 08/24/2021     Lab Results   Component Value Date    GLC 88 08/24/2021                  Pending Results:   None           Discharge Instructions and Follow-Up:   Discharge diet: Regular   Discharge activity: Daily weights: Call if weight gain 2-3 lbs over 24 hours or if greater than 5 lbs in 1 week.  No lifting more than 10 lbs for 8-12 weeks.  No driving for 1 month.  Call for pain medication refill.  Call for temperature greater than 101.0  Daily shower with antibacterial soap.   Discharge follow-up: Follow-up Appointments    Follow-up Appointments   Follow-up and recommended labs and tests     Follow up with Dr. Poole at the [facility] on Wednesday, August 25th at 1:45 pm for hospital follow up, INR blood draw and enrollment into the anticoagulation clinic for your ongoing monitoring of your coumadin.       Follow-up and recommended labs and tests     *Follow up primary care provider in 7-10 days after discharge in order to review your medication, vital signs, obtain any necessary lab work your doctor may want, and to update them on your hospitalization and medical issues.   *Follow up with Shawna/Krystyna/Berry Ash with  Dr Montaño heart surgeon, on 9/8/21 at 3pm at [facility] at [facility] [address]. If any questions or concerns call [phone].  You will see us once at this visit and then if everything is going well you will not need to see us again.  You will follow long term with your cardiologist.   *Follow up with Dr Amor, cardiologist, on 10/15/21 at 10:15am. This is who you will follow with long term about your heart issues. [phone].   *Please follow up with outpatient cardiac rehab on 9/2/21 at 10am, call [phone] if you need to reschedule            Outpatient therapy: Cardiac rehab   Home Care agency: None    Supplies and equipment: None   Lines and drains: None    Wound care: Wash incision daily with antibacterial soap   Other instructions: None

## 2021-08-24 NOTE — PLAN OF CARE
Tele = SR w/BBB. Ativan given x1 and Oxycodone x1 this shift. Refused shower last evening, wants to wait til this am prior to discontinue.02 on at 1LPM overnight,  sats drop to high 80's at times. Encouraged to use IS more frequently.

## 2021-08-24 NOTE — DISCHARGE INSTRUCTIONS
*Weight yourself daily and please call if weight gain 2-3 lbs over 24 hours or if greater than 5 lbs in 1 week as this may indicate fluid overload and could signify a problem with your heart. Please also call if you notice worsening swelling in your legs as this may indicate fluid overload as well.  A certain amount of swelling is expected, especially if you had vein taken out of your leg for bypass surgery.  Keep your legs elevated above the level of your heart when you are in bed to help drainage.  *No lifting more than 10 lbs for 8-12 weeks.  *No driving for 1 month.  *Call for pain medication refill.  Other medications should be filled by your primary doctor.  *Call for temperature greater than 101 degree Fahrenheit, signs of incision infection, such as redness, drainage, odor, pain.  *Daily shower with antibacterial soap. You can gently clean incision.  *Some popping or clicking sensations can be normal after open heart surgery as the chest has been stretched open.  If it is associated with significant pain or becomes bothersome, please call.  *Some drainage from the sites where your chest tubes were is normal and can persist for awhile until the tract has healed, it is best to keep this open to air to allow scab formation and healing, but you can cover it with a sterile gauze pad if necessary for comfort.  *It can be normal to have a decrease appetite for awhile after surgery.  You can eat whatever it takes to keep up a normal food/calorie intake in the immediate post-operative period for about a month or so, and eventually we will want you to adopt a heart healthy diet, especially if you have coronary artery disease.  *If you are going home, please set up outpatient cardiac rehabilitation as soon as possible, knowing that there may be a bit of a delay before you can start after you get discharged from the hospital.  In the meantime, continue to work on the rehab activities you learned in the hospital and  "maintain your physical activity.  Aerobic activity, especially walking, is a great way to regain your physical endurance.  *Please continue docusate (Colace) for as long as you are on narcotic pain medications (oxycodone, Oxycontin, MS Contin), as these medication can be constipating.  You can discontinue these once you are off these medications and having normal bowel movements or sooner if you are experiencing diarrhea.  *You may use Tylenol (acetaminophen) as needed for pain as well to help transition off of narcotic pain medications, as long as you have no problems with your liver.  Keep intake less than 4 g (4,000 mg) of Tylenol daily.  *The following medications may or may not be new for you:   -Aspirin: this helps to thin your blood and possibly prevent clot formation, this can   also be hard on your stomach though so be sure to use the \"enteric coated\"   Version.  You may be prescribed either the baby aspirin dose (81 mg), or the   adult dose (325 mg) depending on your situation.     -Beta blocker (carvedilol, atenolol, metoprolol): this medication lowers your blood   pressure and heart rate and is helpful for your heart, especially if you have had a   heart attack or bypass surgery.  This medication can also help prevent atrial   fibrillation, a heart rhythm disturbance, or control the rate of it so your heart isn't   beating so fast.     -Cholesterol medication (atorvastatin, simvastatin, rosuvastatin): these    medications help decrease cholesterol, which is a contributor to plaque formation  in your arteries.   -Zantac (ranitidine)/Protonix (pantoprazole): these medication reduce the acid in your stomach, which can be high from the stress of surgery.  These should be continued in the post-operative period, but don't necessarily need to continue long term, unless you were previously on them or had an ulcer.   -Warfarin: Will give you 7.5mg today before you leave the hospital and then continue 5mg daily " unless instructed otherwise at INR follow up

## 2021-08-25 ENCOUNTER — PATIENT OUTREACH (OUTPATIENT)
Dept: CARE COORDINATION | Facility: CLINIC | Age: 56
End: 2021-08-25

## 2021-08-25 DIAGNOSIS — Z71.89 OTHER SPECIFIED COUNSELING: ICD-10-CM

## 2021-08-25 LAB
ATRIAL RATE - MUSE: 66 BPM
DIASTOLIC BLOOD PRESSURE - MUSE: NORMAL MMHG
INTERPRETATION ECG - MUSE: NORMAL
P AXIS - MUSE: 3 DEGREES
PR INTERVAL - MUSE: 190 MS
QRS DURATION - MUSE: 146 MS
QT - MUSE: 500 MS
QTC - MUSE: 524 MS
R AXIS - MUSE: -68 DEGREES
SYSTOLIC BLOOD PRESSURE - MUSE: NORMAL MMHG
T AXIS - MUSE: 124 DEGREES
VENTRICULAR RATE- MUSE: 66 BPM

## 2021-08-25 NOTE — PROGRESS NOTES
Clinic Care Coordination Contact    Background: Care Coordination referral placed from [facility] discharge report for reason of patient meeting criteria for a TCM outreach call by [facility] team.    Assessment: Upon chart review, [facility] Team member will cancel/close the referral for TCM outreach due to reason below:     Patient has a follow up appointment with an appropriate provider today for hospital discharge.     Plan: Care Coordination referral for TCM outreach canceled.      Tatum Sandoval RN  [facility], [facility]

## 2021-08-26 ENCOUNTER — TELEPHONE (OUTPATIENT)
Dept: CARDIOLOGY | Facility: CLINIC | Age: 56
End: 2021-08-26

## 2021-08-26 NOTE — TELEPHONE ENCOUNTER
48 hour post op call    ACTIVITY  How are you doing with activity? Up walking around.   Are you continuing to use your IS 3-4 times a day and do you have any shortness of breath.     Are you weighing yourself daily and has it been stable? Yes, weight dropped.     PAIN  How is your pain, what are you doing for your pain? Yes.     Are you still doing sternal precautions? yes  Do you hear any clicking when you are moving or taking a deep breath? no     INCISION:   Washing daily with antibacterial soap    ASSISTANCE  Do you have someone at home to help you with your daily activities and transportation needs? yes      MEDICATIONS  I would like to review your discharge medications and answer any questions you may have.       Are you on a blood thinner/Coumadin yes, for mechanical valve. INR supratherapeutic.     Who is managing your Coumadin dosing/INR? [facility]       FOLLOW UP       Scheduled for cardiac rehab? 9/2/21  Scheduled to see our PA or Surgeon? 9/8/21 at 3 PM  Scheduled to see your cardiologist ? Dr. Amor 10/15/21 at 10:15   Scheduled to see your primary care physician? Saw yesterday   Do you have our contact information? yes    [employer]    Shawna Chapin PA-C

## 2021-08-27 NOTE — ADDENDUM NOTE
Addendum  created 08/27/21 1429 by Ptear Albarado, DO    Child order released for a procedure order, Clinical Note Signed, Diagnosis association updated, Intraprocedure Blocks edited, Intraprocedure Event edited

## 2021-08-27 NOTE — ANESTHESIA PROCEDURE NOTES
Perioperative DEVAUGHN Procedure Note    Staff -        Anesthesiologist:  Petar Albarado DO       Performed By: anesthesiologist  Preanesthesia Checklist:  Patient identified, IV assessed, risks and benefits discussed, monitors and equipment assessed, procedure being performed at surgeon's request and anesthesia consent obtained.    DEVAUGHN Probe Insertion    Probe Status PRE Insertion: NO obvious damage  Probe type:  Adult 3D  Bite block used:   Soft  Insertion Technique: Easy, no oropharyngeal manipulation  Insertion complications: None obvious  Billing Report:A DEVAUGHN report is NOT being generated.  Probe Status POST Removal: NO obvious damage

## 2021-09-02 ENCOUNTER — HOSPITAL ENCOUNTER (OUTPATIENT)
Dept: CARDIAC REHAB | Facility: CLINIC | Age: 56
End: 2021-09-02
Attending: SURGERY
Payer: COMMERCIAL

## 2021-09-02 DIAGNOSIS — I35.0 AORTIC VALVE STENOSIS, ETIOLOGY OF CARDIAC VALVE DISEASE UNSPECIFIED: ICD-10-CM

## 2021-09-02 PROCEDURE — 999N000109 HC STATISTIC OP CR VISIT: Performed by: OCCUPATIONAL THERAPIST

## 2021-09-02 PROCEDURE — 93797 PHYS/QHP OP CAR RHAB WO ECG: CPT | Performed by: OCCUPATIONAL THERAPIST

## 2021-09-02 PROCEDURE — 93798 PHYS/QHP OP CAR RHAB W/ECG: CPT | Performed by: OCCUPATIONAL THERAPIST

## 2021-09-02 PROCEDURE — 999N000108 HC STATISTIC OP CARDIAC VISIT #2: Performed by: OCCUPATIONAL THERAPIST

## 2021-09-07 ENCOUNTER — HOSPITAL ENCOUNTER (OUTPATIENT)
Dept: CARDIAC REHAB | Facility: CLINIC | Age: 56
End: 2021-09-07
Attending: SURGERY
Payer: COMMERCIAL

## 2021-09-07 PROCEDURE — 93798 PHYS/QHP OP CAR RHAB W/ECG: CPT | Performed by: REHABILITATION PRACTITIONER

## 2021-09-08 ENCOUNTER — OFFICE VISIT (OUTPATIENT)
Dept: CARDIOLOGY | Facility: CLINIC | Age: 56
End: 2021-09-08
Payer: COMMERCIAL

## 2021-09-08 VITALS
BODY MASS INDEX: 20.16 KG/M2 | HEART RATE: 98 BPM | DIASTOLIC BLOOD PRESSURE: 79 MMHG | SYSTOLIC BLOOD PRESSURE: 131 MMHG | HEIGHT: 65 IN | WEIGHT: 121 LBS | OXYGEN SATURATION: 98 %

## 2021-09-08 DIAGNOSIS — Z95.4 S/P AORTIC VALVE REPLACEMENT WITH METALLIC VALVE: Primary | ICD-10-CM

## 2021-09-08 PROCEDURE — 99024 POSTOP FOLLOW-UP VISIT: CPT | Performed by: PHYSICIAN ASSISTANT

## 2021-09-08 ASSESSMENT — MIFFLIN-ST. JEOR: SCORE: 1139.73

## 2021-09-08 NOTE — PATIENT INSTRUCTIONS
Your surgery was on 8/19/21    Ok to start driving on 9/16/21 as long as you are no longer taking narcotic pain medications.    No lifting greater than 10 pounds until 10/14/21, then    No lifting greater than 20-30 pounds until 11/11/21    Do not soak your incisions until fully healed over with no scabbing; this includes hot tubs, baths, pools etc.    If you have questions or concerns, please call us:    Aiyana Norris  [phone]

## 2021-09-08 NOTE — PROGRESS NOTES
CV Surgery Post Op Follow Up Note:     S:  From discharge summary:     On 8/19/21, Mayda Lezama underwent successful Aortic valve replacement with a 23 mm [employer] [place] mechanical valve, intraoperative DEVAUGHN by Dr. Stephanie Montaño     She was extubated within protocol and once weaned from hemodynamic stabilizing infusions was transferred to the surgical telemetry floor. Her chest tubes and pacing wires were removed when appropriate and post removal CXR stable.      She has a mechanical aortic valve and was started on coumadin immediately postoperatively and will continue coumadin lifelong with an INR goal 2-3. At discharge her INR was 1.96 and had trended down from the previous day so was given 7.5mg prior to discharge, with an anticipated dose of 5mg daily as an outpatient. Follow up for INR management arranged for tomorrow and dosing may be further adjusted at that time. Patient instructed to call our office with subtherapeutic INRs as she may need to be readmitted for heparin bridging.     She has history of alcohol abuse and was monitored closely for alcohol withdrawal. She has history of anxiety as well and PTA cymbalta and ativan prescribed.     She was fluid overloaded and treated with diuretic therapies. At discharge she remains up about 2kg form preoperative weight and was given additional IV lasix prior to discharge. No further diuretic therapies needed at discharge.     She was transiently hyperglycemic and treated with insulin infusion then transitioned to sliding scale insulin per protocol. Her blood sugars remained stable and no further glycemic control agents were needed at discharge.      She had a left pleural effusion and underwent left thoracentesis on POD#4 for removal of 375ml. She was weaned off oxygen without issue.     She has worked well with therapies, had return of bowel function and INR therapeutic. Attempted to resume losartan during inpatient but her blood pressure did not tolerate,  will re-evaluate as an outpatient and resume as able. She is stable for discharge to home with the help of her . Follow up with cardiac rehab, cardiac surgery, cardiology and INR follow up arranged. Discharge teaching provided to patient and spouse.    Since being discharged from hospital, patient is recovering well at home. She initially lost some weight and weight has been stable. Her pain is controlled with just tylenol. She does complain of some heartburn today but resolved with tums. She has been to cardiac rehab and tolerated well. She has been using her IS and is improving. She has been increasing her activity. She has been following up with her INR clinic and her INR was initially supratherapeutic and has since dropped down to normal range. We reviewed her medications and all questions were answered.     Allergies:   Allergies   Allergen Reactions     Metoclopramide Other (See Comments)       Medications:   Current Outpatient Medications:      acetaminophen (TYLENOL) 325 MG tablet, Take 2 tablets (650 mg) by mouth every 6 hours as needed for mild pain, fever or other (For optimal non-opioid multimodal pain management to improve pain control.), Disp: 60 tablet, Rfl: 0     aspirin (ASA) 81 MG chewable tablet, 1 tablet (81 mg) by Oral or NG Tube route daily, Disp: 30 tablet, Rfl: 3     DULoxetine (CYMBALTA) 20 MG capsule, Take 20 mg by mouth daily , Disp: , Rfl:      LORazepam (ATIVAN) 0.5 MG tablet, Take 1 tablet (0.5 mg) by mouth every 6 hours as needed for anxiety (or flying), Disp: 10 tablet, Rfl: 0     methocarbamol (ROBAXIN) 500 MG tablet, Take 1 tablet (500 mg) by mouth every 6 hours as needed for muscle spasms, Disp: 20 tablet, Rfl: 0     metoprolol tartrate (LOPRESSOR) 25 MG tablet, Take 0.5 tablets (12.5 mg) by mouth 2 times daily, Disp: 30 tablet, Rfl: 3     oxyCODONE (ROXICODONE) 5 MG tablet, Take 1 tablet (5 mg) by mouth every 6 hours as needed for moderate to severe pain, Disp: 20 tablet,  "Rfl: 0     pantoprazole (PROTONIX) 40 MG EC tablet, Take 1 tablet (40 mg) by mouth daily for 14 days, Disp: 14 tablet, Rfl: 0     polyethylene glycol (MIRALAX) 17 g packet, Take 17 g by mouth daily as needed for constipation, Disp: , Rfl:      senna-docusate (SENOKOT-S/PERICOLACE) 8.6-50 MG tablet, Take 1 tablet by mouth 2 times daily as needed for constipation, Disp: , Rfl:      warfarin ANTICOAGULANT (COUMADIN) 5 MG tablet, Take 1 tablet (5 mg) by mouth daily, Disp: 30 tablet, Rfl: 3    Physical Exam:   /79   Pulse 98   Ht 1.651 m (5' 5\")   Wt 54.9 kg (121 lb)   SpO2 98%   BMI 20.14 kg/m    Gen: NAD  CV: RRR  Normal s1 and s2  Lungs: diminished bases  Sternum: CDI  Extremities: minimal edema    Assessment/Plan:     Continue current medications  Continue cardiac rehab  Discussed timeline for restrictions  Discussed normal expectations for recovery  Follow up with cardiology as scheduled    All of the patient's questions were answered. Our contact information was given to him/her if they should have any further questions/concerns. No further follow-up is needed with us.      Krystyna Rico PA-C  CV Surgery  [facility]  Pager: [phone]  "

## 2021-09-09 ENCOUNTER — HOSPITAL ENCOUNTER (OUTPATIENT)
Dept: CARDIAC REHAB | Facility: CLINIC | Age: 56
End: 2021-09-09
Attending: SURGERY
Payer: COMMERCIAL

## 2021-09-09 PROCEDURE — 93798 PHYS/QHP OP CAR RHAB W/ECG: CPT

## 2021-09-13 ENCOUNTER — HOSPITAL ENCOUNTER (OUTPATIENT)
Dept: CARDIAC REHAB | Facility: CLINIC | Age: 56
End: 2021-09-13
Attending: SURGERY
Payer: COMMERCIAL

## 2021-09-13 PROCEDURE — 93798 PHYS/QHP OP CAR RHAB W/ECG: CPT

## 2021-09-15 ENCOUNTER — HOSPITAL ENCOUNTER (OUTPATIENT)
Dept: CARDIAC REHAB | Facility: CLINIC | Age: 56
End: 2021-09-15
Attending: SURGERY
Payer: COMMERCIAL

## 2021-09-15 PROCEDURE — 93798 PHYS/QHP OP CAR RHAB W/ECG: CPT | Performed by: REHABILITATION PRACTITIONER

## 2021-09-22 ENCOUNTER — HOSPITAL ENCOUNTER (OUTPATIENT)
Dept: CARDIAC REHAB | Facility: CLINIC | Age: 56
End: 2021-09-22
Attending: SURGERY
Payer: COMMERCIAL

## 2021-09-22 PROCEDURE — 93798 PHYS/QHP OP CAR RHAB W/ECG: CPT | Performed by: REHABILITATION PRACTITIONER

## 2021-09-24 ENCOUNTER — HOSPITAL ENCOUNTER (OUTPATIENT)
Dept: CARDIAC REHAB | Facility: CLINIC | Age: 56
End: 2021-09-24
Attending: SURGERY
Payer: COMMERCIAL

## 2021-09-24 PROCEDURE — 93798 PHYS/QHP OP CAR RHAB W/ECG: CPT

## 2021-11-18 ENCOUNTER — TELEPHONE (OUTPATIENT)
Dept: OTHER | Facility: CLINIC | Age: 56
End: 2021-11-18
Payer: COMMERCIAL

## 2021-11-18 NOTE — TELEPHONE ENCOUNTER
Patient called 3 months post op AVR, mechanical with new onset left shoulder ain radiating to her neck. States she has had tylenol and pain patch to site with no relief. No shortness of breath or chest pain. Denies any clicking or popping in her chest. Incision healed. Last documented INR 9/24. She stated she had it drawn at her PCP. No documentation in Epic or care everywhere. She stopped cardiac rehab after only 6 sessions and was a no show for her cardiology follow up. Recommended she see her PCP for new shoulder pain, requested a new cardiology follow up appointment for her. I stressed compliance with her medical care, especially her INR with a mechanical valve. Instructed her we would be happy to see her if her PCP recommends and stressed cardiology and INR follow up. Patient agreeable.

## 2021-12-08 NOTE — PROGRESS NOTES
Cardiology Clinic Progress Note  Mayda Lezama MRN# [id number]   YOB: 1965 Age: 56 year old     Primary cardiologist: Dr. Amor    Reason for visit: Follow up AVR    History of presenting illness:    Mayda Lezama, a pleasant 56 year old patient who has a past medical history for hypertension, bicuspid aortic valve status post mechanical AVR in August 2021, anxiety and alcohol use.    In August 2021 she presented to [facility] with shortness of breath and palpitations.  An echocardiogram revealed critical aortic stenosis with an LVEF of 25 to 30%.  Her right and left heart catheterization prior to surgery showed moderately elevated right and left-sided pressures without obstructive coronary artery disease.  Was recommended that she undergo a surgical aortic valve replacement    On 8/19/2021 she underwent an aortic valve replacement with a 23 mm [employer] [place] mechanical valve by Dr. Montaño.  Her postoperative course included fluid overload that was treated with diuresis and transient hypoglycemia.  On postop day 4 she underwent a left thoracentesis for a left pleural effusion with removal of the 375 ml.    Today she reports returns for a follow-up post AVR.  She is feeling overall well without any chest discomfort, shortness of breath or palpitations.  She has difficulty getting to her Coumadin clinic appointments due to her work schedule this past fall and also was unable to make her follow-up office visit in October due to her work schedule. Stressed the importance of maintaining a therapeutic INR.  Her sternal wound is healing well.         Assessment and Plan:     ASSESSMENT:    1. Bicusped aortic valve    S/p mechanical AVR with 23 mm [place] [employer] by Dr. Montaño    Maintain a therapeutic INR of 2-3    Has not had follow-up echo postoperatively    2. Nonischemic cardiomyopathy    Preoperatively LVEF was 25 to 30% in the setting of critical aortic valve  "stenosis    Status post mechanical aortic valve    3. Hypertension    Maintained on metoprolol 12.5 mg twice daily    Controlled    PLAN:     1. Echocardiogram  2. Return to clinic in 6 months and follow up with Dr. Mt Min this Visit:  Orders Placed This Encounter   Procedures     Follow-Up with Cardiologist     Echocardiogram Complete     No orders of the defined types were placed in this encounter.    There are no discontinued medications.    Today's clinic visit entailed:  Review of the result(s) of each unique test - Echo, right and left heart cath  Ordering of each unique test  Prescription drug management  20 minutes spent on the date of the encounter doing chart review, history and exam, documentation and further activities per the note  Provider  Link to [employer] Help Grid     The level of medical decision making during this visit was of moderate complexity.           Review of Systems:     Review of Systems:  Skin:  Negative     Eyes:  Positive for glasses  ENT:  Negative    Respiratory:  Negative    Cardiovascular:  Negative    Gastroenterology: Negative    Genitourinary:  not assessed    Musculoskeletal:  Negative    Neurologic:  Negative    Psychiatric:  Negative    Heme/Lymph/Imm:  Negative    Endocrine:  Negative              Physical Exam:   Vitals: /70   Pulse 78   Ht 1.651 m (5' 5\")   Wt 53.5 kg (118 lb)   BMI 19.64 kg/m    Constitutional:  well developed;well nourished        Skin:  warm and dry to the touch surgical scars well-healed;incision healing well      Head:  no masses or lesions        Eyes:  pupils equal and round        ENT:  no pallor or cyanosis        Neck:  no stiffness        Chest:  clear to auscultation        Cardiac: regular rhythm             Crisp mechanical valve sounds    Abdomen:  abdomen soft        Vascular: not assessed this visit                                      Extremities and Back:  no edema        Neurological:  no gross motor deficits    "          Medications:     Current Outpatient Medications   Medication Sig Dispense Refill     acetaminophen (TYLENOL) 325 MG tablet Take 2 tablets (650 mg) by mouth every 6 hours as needed for mild pain, fever or other (For optimal non-opioid multimodal pain management to improve pain control.) 60 tablet 0     aspirin (ASA) 81 MG chewable tablet 1 tablet (81 mg) by Oral or NG Tube route daily 30 tablet 3     DULoxetine (CYMBALTA) 20 MG capsule Take 20 mg by mouth daily        LORazepam (ATIVAN) 0.5 MG tablet Take 1 tablet (0.5 mg) by mouth every 6 hours as needed for anxiety (or flying) 10 tablet 0     metoprolol tartrate (LOPRESSOR) 25 MG tablet Take 0.5 tablets (12.5 mg) by mouth 2 times daily 30 tablet 3     warfarin ANTICOAGULANT (COUMADIN) 5 MG tablet Take 1 tablet (5 mg) by mouth daily 30 tablet 3       No family history on file.    Social History     Socioeconomic History     Marital status:      Spouse name: Not on file     Number of children: Not on file     Years of education: Not on file     Highest education level: Not on file   Occupational History     Not on file   Tobacco Use     Smoking status: Current Every Day Smoker     Packs/day: 0.25     Smokeless tobacco: Never Used     Tobacco comment: 2 packs a week   Substance and Sexual Activity     Alcohol use: Yes     Drug use: No     Sexual activity: Not on file   Other Topics Concern     Not on file   Social History Narrative     Not on file     Social Determinants of Health     Financial Resource Strain: Not on file   Food Insecurity: Not on file   Transportation Needs: Not on file   Physical Activity: Not on file   Stress: Not on file   Social Connections: Not on file   Intimate Partner Violence: Not on file   Housing Stability: Not on file            Past Medical History:     Past Medical History:   Diagnosis Date     Migraines               Past Surgical History:     Past Surgical History:   Procedure Laterality Date     CV CORONARY ANGIOGRAM  N/A 8/17/2021    Procedure: Coronary Angiogram;  Surgeon: Hira Greenfield MD;  Location:  HEART CARDIAC CATH LAB     CV RIGHT AND LEFT HEART CATH N/A 8/17/2021    Procedure: CV RIGHT AND LEFT HEART CATH;  Surgeon: Hira Greenfield MD;  Location:  HEART CARDIAC CATH LAB     REPLACE VALVE AORTIC N/A 8/19/2021    Procedure: AORTIC VALVE REPLACEMENT WITH MECHANICAL AORTIC HEART VALVE [facility]   SIZE: 23MM, AND ON CARDIOPULMONARY PUMP OXYGENATOR  (INTRAOPERATIVE TRANSESOPHAGEAL ECHOCARDIOGRAM BY ANESTHESIOLOGIST)   ;  Surgeon: Stephanie Montaño MD;  Location:  OR              Allergies:   Metoclopramide       Data:   All laboratory data reviewed:    Recent Labs   Lab Test 08/21/21  1429 08/15/21  1427   TSH 2.77 0.66       Lab Results   Component Value Date    WBC 6.0 08/24/2021    RBC 2.85 (L) 08/24/2021    HGB 9.2 (L) 08/24/2021    HCT 27.5 (L) 08/24/2021    MCV 97 08/24/2021    MCH 32.3 08/24/2021    MCHC 33.5 08/24/2021    RDW 12.3 08/24/2021     08/24/2021       Lab Results   Component Value Date     (L) 08/24/2021    POTASSIUM 3.6 08/24/2021    CHLORIDE 99 08/24/2021    CO2 27 08/24/2021    ANIONGAP 6 08/24/2021    GLC 88 08/24/2021    BUN 9 08/24/2021    CR 0.63 08/24/2021    GFRESTIMATED >90 08/24/2021    KARLY 8.7 08/24/2021      Lab Results   Component Value Date    AST 65 (H) 08/20/2021    ALT 37 08/20/2021       Lab Results   Component Value Date    A1C 5.0 08/16/2021       Lab Results   Component Value Date    INR 1.96 (H) 08/24/2021    INR 2.17 (H) 08/23/2021         ABRIL BACON [facility]  Pager: [phone]  RN phone: [phone]

## 2021-12-10 ENCOUNTER — OFFICE VISIT (OUTPATIENT)
Dept: CARDIOLOGY | Facility: CLINIC | Age: 56
End: 2021-12-10
Payer: COMMERCIAL

## 2021-12-10 VITALS
HEART RATE: 78 BPM | BODY MASS INDEX: 19.66 KG/M2 | SYSTOLIC BLOOD PRESSURE: 138 MMHG | WEIGHT: 118 LBS | HEIGHT: 65 IN | DIASTOLIC BLOOD PRESSURE: 70 MMHG

## 2021-12-10 DIAGNOSIS — Z95.4 S/P AORTIC VALVE REPLACEMENT WITH METALLIC VALVE: Primary | ICD-10-CM

## 2021-12-10 PROCEDURE — 99214 OFFICE O/P EST MOD 30 MIN: CPT | Performed by: NURSE PRACTITIONER

## 2021-12-10 ASSESSMENT — MIFFLIN-ST. JEOR: SCORE: 1121.12

## 2021-12-10 NOTE — LETTER
12/10/2021    Alaina HAGEN [facility] [address]  [facility] [address]    RE: Mayda Lezama       Dear Colleague,     I had the pleasure of seeing Mayda Lezama in the [facility].    Cardiology Clinic Progress Note  Mayda Lezama MRN# [id number]   YOB: 1965 Age: 56 year old     Primary cardiologist: Dr. Amor    Reason for visit: Follow up AVR    History of presenting illness:    Mayda Lezama, a pleasant 56 year old patient who has a past medical history for hypertension, bicuspid aortic valve status post mechanical AVR in August 2021, anxiety and alcohol use.    In August 2021 she presented to [facility] with shortness of breath and palpitations.  An echocardiogram revealed critical aortic stenosis with an LVEF of 25 to 30%.  Her right and left heart catheterization prior to surgery showed moderately elevated right and left-sided pressures without obstructive coronary artery disease.  Was recommended that she undergo a surgical aortic valve replacement    On 8/19/2021 she underwent an aortic valve replacement with a 23 mm [facility] [place] mechanical valve by Dr. Montaño.  Her postoperative course included fluid overload that was treated with diuresis and transient hypoglycemia.  On postop day 4 she underwent a left thoracentesis for a left pleural effusion with removal of the 375 ml.    Today she reports returns for a follow-up post AVR.  She is feeling overall well without any chest discomfort, shortness of breath or palpitations.  She has difficulty getting to her Coumadin clinic appointments due to her work schedule this past fall and also was unable to make her follow-up office visit in October due to her work schedule. Stressed the importance of maintaining a therapeutic INR.  Her sternal wound is healing well.         Assessment and Plan:     ASSESSMENT:    1. Bicusped aortic valve    S/p mechanical AVR with 23 mm  "[place] [facility] [place] by Dr. Montaño    Maintain a therapeutic INR of 2-3    Has not had follow-up echo postoperatively    2. Nonischemic cardiomyopathy    Preoperatively LVEF was 25 to 30% in the setting of critical aortic valve stenosis    Status post mechanical aortic valve    3. Hypertension    Maintained on metoprolol 12.5 mg twice daily    Controlled    PLAN:     1. Echocardiogram  2. Return to clinic in 6 months and follow up with Dr. Mt Min this Visit:  Orders Placed This Encounter   Procedures     Follow-Up with Cardiologist     Echocardiogram Complete     No orders of the defined types were placed in this encounter.    There are no discontinued medications.    Today's clinic visit entailed:  Review of the result(s) of each unique test - Echo, right and left heart cath  Ordering of each unique test  Prescription drug management  20 minutes spent on the date of the encounter doing chart review, history and exam, documentation and further activities per the note  Provider  Link to [facility] Help Grid     The level of medical decision making during this visit was of moderate complexity.           Review of Systems:     Review of Systems:  Skin:  Negative     Eyes:  Positive for glasses  ENT:  Negative    Respiratory:  Negative    Cardiovascular:  Negative    Gastroenterology: Negative    Genitourinary:  not assessed    Musculoskeletal:  Negative    Neurologic:  Negative    Psychiatric:  Negative    Heme/Lymph/Imm:  Negative    Endocrine:  Negative              Physical Exam:   Vitals: /70   Pulse 78   Ht 1.651 m (5' 5\")   Wt 53.5 kg (118 lb)   BMI 19.64 kg/m    Constitutional:  well developed;well nourished        Skin:  warm and dry to the touch surgical scars well-healed;incision healing well      Head:  no masses or lesions        Eyes:  pupils equal and round        ENT:  no pallor or cyanosis        Neck:  no stiffness        Chest:  clear to auscultation        Cardiac: regular rhythm      "        Crisp mechanical valve sounds    Abdomen:  abdomen soft        Vascular: not assessed this visit                                      Extremities and Back:  no edema        Neurological:  no gross motor deficits             Medications:     Current Outpatient Medications   Medication Sig Dispense Refill     acetaminophen (TYLENOL) 325 MG tablet Take 2 tablets (650 mg) by mouth every 6 hours as needed for mild pain, fever or other (For optimal non-opioid multimodal pain management to improve pain control.) 60 tablet 0     aspirin (ASA) 81 MG chewable tablet 1 tablet (81 mg) by Oral or NG Tube route daily 30 tablet 3     DULoxetine (CYMBALTA) 20 MG capsule Take 20 mg by mouth daily        LORazepam (ATIVAN) 0.5 MG tablet Take 1 tablet (0.5 mg) by mouth every 6 hours as needed for anxiety (or flying) 10 tablet 0     metoprolol tartrate (LOPRESSOR) 25 MG tablet Take 0.5 tablets (12.5 mg) by mouth 2 times daily 30 tablet 3     warfarin ANTICOAGULANT (COUMADIN) 5 MG tablet Take 1 tablet (5 mg) by mouth daily 30 tablet 3       No family history on file.    Social History     Socioeconomic History     Marital status:      Spouse name: Not on file     Number of children: Not on file     Years of education: Not on file     Highest education level: Not on file   Occupational History     Not on file   Tobacco Use     Smoking status: Current Every Day Smoker     Packs/day: 0.25     Smokeless tobacco: Never Used     Tobacco comment: 2 packs a week   Substance and Sexual Activity     Alcohol use: Yes     Drug use: No     Sexual activity: Not on file   Other Topics Concern     Not on file   Social History Narrative     Not on file     Social Determinants of Health     Financial Resource Strain: Not on file   Food Insecurity: Not on file   Transportation Needs: Not on file   Physical Activity: Not on file   Stress: Not on file   Social Connections: Not on file   Intimate Partner Violence: Not on file   Housing  Stability: Not on file            Past Medical History:     Past Medical History:   Diagnosis Date     Migraines               Past Surgical History:     Past Surgical History:   Procedure Laterality Date     CV CORONARY ANGIOGRAM N/A 8/17/2021    Procedure: Coronary Angiogram;  Surgeon: Hira Greenfield MD;  Location:  HEART CARDIAC CATH LAB     CV RIGHT AND LEFT HEART CATH N/A 8/17/2021    Procedure: CV RIGHT AND LEFT HEART CATH;  Surgeon: Hira Greenfield MD;  Location:  HEART CARDIAC CATH LAB     REPLACE VALVE AORTIC N/A 8/19/2021    Procedure: AORTIC VALVE REPLACEMENT WITH MECHANICAL AORTIC HEART VALVE [facility]   SIZE: 23MM, AND ON CARDIOPULMONARY PUMP OXYGENATOR  (INTRAOPERATIVE TRANSESOPHAGEAL ECHOCARDIOGRAM BY ANESTHESIOLOGIST)   ;  Surgeon: Stephanie Montaño MD;  Location:  OR              Allergies:   Metoclopramide       Data:   All laboratory data reviewed:    Recent Labs   Lab Test 08/21/21  1429 08/15/21  1427   TSH 2.77 0.66       Lab Results   Component Value Date    WBC 6.0 08/24/2021    RBC 2.85 (L) 08/24/2021    HGB 9.2 (L) 08/24/2021    HCT 27.5 (L) 08/24/2021    MCV 97 08/24/2021    MCH 32.3 08/24/2021    MCHC 33.5 08/24/2021    RDW 12.3 08/24/2021     08/24/2021       Lab Results   Component Value Date     (L) 08/24/2021    POTASSIUM 3.6 08/24/2021    CHLORIDE 99 08/24/2021    CO2 27 08/24/2021    ANIONGAP 6 08/24/2021    GLC 88 08/24/2021    BUN 9 08/24/2021    CR 0.63 08/24/2021    GFRESTIMATED >90 08/24/2021    KARLY 8.7 08/24/2021      Lab Results   Component Value Date    AST 65 (H) 08/20/2021    ALT 37 08/20/2021       Lab Results   Component Value Date    A1C 5.0 08/16/2021       Lab Results   Component Value Date    INR 1.96 (H) 08/24/2021    INR 2.17 (H) 08/23/2021         ABRIL BACON [facility]  Pager: [phone]  RN phone: [phone]    Thank you for allowing me to participate in the care of your  patient.      Sincerely,     ABRIL BACON [facility]  cc:   No referring provider defined for this encounter.

## 2021-12-10 NOTE — PATIENT INSTRUCTIONS
Today's Recommendations    1. Echocardiogram in next month  2. Continue all other medications without changes.  3. Please follow up with Dr. Amor in 6 months.    Please send a [facility] message or call [phone] with questions or concerns.     Scheduling number [phone].

## 2021-12-21 DIAGNOSIS — Z95.4 S/P AORTIC VALVE REPLACEMENT WITH METALLIC VALVE: ICD-10-CM

## 2021-12-21 RX ORDER — METOPROLOL TARTRATE 25 MG/1
12.5 TABLET, FILM COATED ORAL 2 TIMES DAILY
Qty: 90 TABLET | Refills: 3 | Status: SHIPPED | OUTPATIENT
Start: 2021-12-21 | End: 2022-12-19

## 2022-01-11 ENCOUNTER — HOSPITAL ENCOUNTER (OUTPATIENT)
Dept: CARDIOLOGY | Facility: CLINIC | Age: 57
Discharge: HOME | End: 2022-01-11
Attending: NURSE PRACTITIONER | Admitting: NURSE PRACTITIONER
Payer: COMMERCIAL

## 2022-01-11 DIAGNOSIS — Z95.4 S/P AORTIC VALVE REPLACEMENT WITH METALLIC VALVE: ICD-10-CM

## 2022-01-11 LAB — LVEF ECHO: NORMAL

## 2022-01-11 PROCEDURE — 93306 TTE W/DOPPLER COMPLETE: CPT

## 2022-01-11 PROCEDURE — 93306 TTE W/DOPPLER COMPLETE: CPT | Mod: 26 | Performed by: INTERNAL MEDICINE

## 2022-04-06 NOTE — PROGRESS NOTES
"SPIRITUAL HEALTH SERVICES Progress Note  [facility] ED    Referral Source: Request for  visit by RN via  help line.    Spouse (Jarad) disclosed that he brought his wife to ED after insisting that she seek treatment. He reports that she was supposed to be in [place] today but remained at home after her activity there was cancelled due to COVID. Jarad stated that he hopes this episode will help he and PT \"get to the bottom\" about her medical condition. He states that \"we'll get through this with the help of God.\"    Provided emotional and spiritual support through active listening and reflection and prayer.    Family is aware of the availability of  services by request. Follow up with PT and family while PT remains in hospital.      Martín Moise  Chaplain Resident   " Health Maintenance Due   Topic Date Due   • Shingles Vaccine (1 of 2) Never done   • Diabetes Foot Exam  01/13/2021   • Diabetes Eye Exam  12/29/2021   • Medicare Advantage- Medicare Wellness Visit  01/01/2022   • DM/CKD Microalbumin  04/01/2022       Discuss with

## 2022-12-19 DIAGNOSIS — Z95.4 S/P AORTIC VALVE REPLACEMENT WITH METALLIC VALVE: ICD-10-CM

## 2022-12-19 RX ORDER — METOPROLOL TARTRATE 25 MG/1
12.5 TABLET, FILM COATED ORAL 2 TIMES DAILY
Qty: 90 TABLET | Refills: 0 | Status: SHIPPED | OUTPATIENT
Start: 2022-12-19 | End: 2023-03-17

## 2023-01-01 NOTE — PLAN OF CARE
Problem: Discharge Planning  Goal: Discharge to home or other facility with appropriate resources  2023 0943 by Malou Miranda RN  Outcome: Progressing  Flowsheets (Taken 2023 0225 by Lydia Dean RN)  Discharge to home or other facility with appropriate resources: Identify barriers to discharge with patient and caregiver     Problem: Pain - [place]  Goal: Displays adequate comfort level or baseline comfort level  2023 0943 by Malou Miranda RN  Outcome: Progressing     Problem: Thermoregulation - /Pediatrics  Goal: Maintains normal body temperature  2023 0943 by Malou Miranda RN  Outcome: Progressing  Flowsheets (Taken 2023 0903)  Maintains Normal Body Temperature:   Monitor temperature (axillary for Newborns) as ordered   Monitor for signs of hypothermia or hyperthermia   Provide thermal support measures     Problem: Safety - [place]  Goal: Free from fall injury  2023 0943 by Malou Miranda RN  Outcome: Progressing  Flowsheets (Taken 2023 0225 by Lydia Dean RN)  Free From Fall Injury: Instruct family/caregiver on patient safety     Problem: Normal   Goal:  experiences normal transition  2023 0943 by Malou Miranda RN  Outcome: Progressing  Flowsheets (Taken 2023 0903)  Experiences Normal Transition:   Monitor vital signs   Maintain thermoregulation   Assess for hypoglycemia risk factors or signs and symptoms   Assess for sepsis risk factors or signs and symptoms   Assess for jaundice risk and/or signs and symptoms     Problem: Normal [place]  Goal: Total Weight Loss Less than 10% of birth weight  2023 0943 by Malou Miranda RN  Outcome: Progressing  Flowsheets (Taken 2023 0903)  Total Weight Loss Less Than 10% of Birth Weight:   Assess feeding patterns   Weigh daily   Care plan reviewed with parents. Parents verbalize understanding of the plan of care and contribute to goal setting. Pt is Ind in room, 1500mL fluid restriction- regular diet. Voiding well. VSS on 2L O2. Chest tubes and pacer wires removed this shift. 5mg Oxy given x1 before chest tubes pulled. Tele is SR w/ BBB. Doing well w/ CR. Na+ trending upward, AM result of 128.

## 2023-03-17 DIAGNOSIS — Z95.4 S/P AORTIC VALVE REPLACEMENT WITH METALLIC VALVE: ICD-10-CM

## 2023-03-17 RX ORDER — METOPROLOL TARTRATE 25 MG/1
12.5 TABLET, FILM COATED ORAL 2 TIMES DAILY
Qty: 90 TABLET | Refills: 0 | Status: SHIPPED | OUTPATIENT
Start: 2023-03-17

## 2023-03-17 NOTE — TELEPHONE ENCOUNTER
[facility] Cardiology Refill Guideline reviewed.  Medication does not meet criteria for refill due to patient is overdue for an appointment. A refill letter was sent on 12/19/22.  Messaged to providers team for further review.

## 2024-10-29 RX ORDER — PREDNISONE 5 MG/1
5 TABLET ORAL DAILY
COMMUNITY

## 2024-10-30 ENCOUNTER — ANESTHESIA EVENT (OUTPATIENT)
Dept: SURGERY | Facility: CLINIC | Age: 59
End: 2024-10-30
Payer: COMMERCIAL

## 2024-10-30 ENCOUNTER — ANESTHESIA (OUTPATIENT)
Dept: SURGERY | Facility: CLINIC | Age: 59
End: 2024-10-30
Payer: COMMERCIAL

## 2024-10-30 ENCOUNTER — HOSPITAL ENCOUNTER (OUTPATIENT)
Facility: CLINIC | Age: 59
Discharge: HOME | End: 2024-10-30
Attending: UROLOGY | Admitting: UROLOGY
Payer: COMMERCIAL

## 2024-10-30 VITALS
RESPIRATION RATE: 20 BRPM | WEIGHT: 127.4 LBS | SYSTOLIC BLOOD PRESSURE: 130 MMHG | TEMPERATURE: 97.8 F | HEIGHT: 63 IN | BODY MASS INDEX: 22.57 KG/M2 | OXYGEN SATURATION: 92 % | DIASTOLIC BLOOD PRESSURE: 70 MMHG | HEART RATE: 84 BPM

## 2024-10-30 DIAGNOSIS — C67.8 MALIGNANT NEOPLASM OF OVERLAPPING SITES OF BLADDER (H): Primary | ICD-10-CM

## 2024-10-30 LAB — INR PPP: 0.96 (ref 0.85–1.15)

## 2024-10-30 PROCEDURE — 85610 PROTHROMBIN TIME: CPT | Performed by: ANESTHESIOLOGY

## 2024-10-30 PROCEDURE — 52224 CYSTOSCOPY AND TREATMENT: CPT | Performed by: REGISTERED NURSE

## 2024-10-30 PROCEDURE — 999N000141 HC STATISTIC PRE-PROCEDURE NURSING ASSESSMENT: Performed by: UROLOGY

## 2024-10-30 PROCEDURE — P9045 ALBUMIN (HUMAN), 5%, 250 ML: HCPCS | Performed by: REGISTERED NURSE

## 2024-10-30 PROCEDURE — 250N000011 HC RX IP 250 OP 636: Performed by: REGISTERED NURSE

## 2024-10-30 PROCEDURE — 88307 TISSUE EXAM BY PATHOLOGIST: CPT | Mod: TC | Performed by: UROLOGY

## 2024-10-30 PROCEDURE — 250N000009 HC RX 250: Performed by: UROLOGY

## 2024-10-30 PROCEDURE — 272N000001 HC OR GENERAL SUPPLY STERILE: Performed by: UROLOGY

## 2024-10-30 PROCEDURE — 250N000013 HC RX MED GY IP 250 OP 250 PS 637: Performed by: PHYSICIAN ASSISTANT

## 2024-10-30 PROCEDURE — 250N000025 HC SEVOFLURANE, PER MIN: Performed by: UROLOGY

## 2024-10-30 PROCEDURE — 710N000012 HC RECOVERY PHASE 2, PER MINUTE: Performed by: UROLOGY

## 2024-10-30 PROCEDURE — 250N000011 HC RX IP 250 OP 636: Performed by: PHYSICIAN ASSISTANT

## 2024-10-30 PROCEDURE — 52224 CYSTOSCOPY AND TREATMENT: CPT | Performed by: ANESTHESIOLOGY

## 2024-10-30 PROCEDURE — 258N000001 HC RX 258: Performed by: UROLOGY

## 2024-10-30 PROCEDURE — 370N000017 HC ANESTHESIA TECHNICAL FEE, PER MIN: Performed by: UROLOGY

## 2024-10-30 PROCEDURE — 258N000003 HC RX IP 258 OP 636: Performed by: REGISTERED NURSE

## 2024-10-30 PROCEDURE — 88307 TISSUE EXAM BY PATHOLOGIST: CPT | Mod: 26 | Performed by: PATHOLOGY

## 2024-10-30 PROCEDURE — 710N000009 HC RECOVERY PHASE 1, LEVEL 1, PER MIN: Performed by: UROLOGY

## 2024-10-30 PROCEDURE — 360N000076 HC SURGERY LEVEL 3, PER MIN: Performed by: UROLOGY

## 2024-10-30 PROCEDURE — 250N000009 HC RX 250: Performed by: REGISTERED NURSE

## 2024-10-30 RX ORDER — FENTANYL CITRATE 50 UG/ML
INJECTION, SOLUTION INTRAMUSCULAR; INTRAVENOUS PRN
Status: DISCONTINUED | OUTPATIENT
Start: 2024-10-30 | End: 2024-10-30

## 2024-10-30 RX ORDER — ACETAMINOPHEN 325 MG/1
650 TABLET ORAL ONCE
Status: DISCONTINUED | OUTPATIENT
Start: 2024-10-30 | End: 2024-10-30 | Stop reason: HOSPADM

## 2024-10-30 RX ORDER — ACETAMINOPHEN 325 MG/1
975 TABLET ORAL ONCE
Status: COMPLETED | OUTPATIENT
Start: 2024-10-30 | End: 2024-10-30

## 2024-10-30 RX ORDER — MAGNESIUM HYDROXIDE 1200 MG/15ML
LIQUID ORAL PRN
Status: DISCONTINUED | OUTPATIENT
Start: 2024-10-30 | End: 2024-10-30 | Stop reason: HOSPADM

## 2024-10-30 RX ORDER — DEXAMETHASONE SODIUM PHOSPHATE 4 MG/ML
INJECTION, SOLUTION INTRA-ARTICULAR; INTRALESIONAL; INTRAMUSCULAR; INTRAVENOUS; SOFT TISSUE PRN
Status: DISCONTINUED | OUTPATIENT
Start: 2024-10-30 | End: 2024-10-30

## 2024-10-30 RX ORDER — OXYCODONE HYDROCHLORIDE 5 MG/1
5 TABLET ORAL ONCE
Status: DISCONTINUED | OUTPATIENT
Start: 2024-10-30 | End: 2024-10-30 | Stop reason: HOSPADM

## 2024-10-30 RX ORDER — SODIUM CHLORIDE, SODIUM LACTATE, POTASSIUM CHLORIDE, CALCIUM CHLORIDE 600; 310; 30; 20 MG/100ML; MG/100ML; MG/100ML; MG/100ML
INJECTION, SOLUTION INTRAVENOUS CONTINUOUS PRN
Status: DISCONTINUED | OUTPATIENT
Start: 2024-10-30 | End: 2024-10-30

## 2024-10-30 RX ORDER — FENTANYL CITRATE 0.05 MG/ML
50 INJECTION, SOLUTION INTRAMUSCULAR; INTRAVENOUS EVERY 5 MIN PRN
Status: DISCONTINUED | OUTPATIENT
Start: 2024-10-30 | End: 2024-10-30 | Stop reason: HOSPADM

## 2024-10-30 RX ORDER — PROPOFOL 10 MG/ML
INJECTION, EMULSION INTRAVENOUS CONTINUOUS PRN
Status: DISCONTINUED | OUTPATIENT
Start: 2024-10-30 | End: 2024-10-30

## 2024-10-30 RX ORDER — CEFAZOLIN SODIUM/WATER 2 G/20 ML
2 SYRINGE (ML) INTRAVENOUS
Status: COMPLETED | OUTPATIENT
Start: 2024-10-30 | End: 2024-10-30

## 2024-10-30 RX ORDER — ONDANSETRON 2 MG/ML
4 INJECTION INTRAMUSCULAR; INTRAVENOUS EVERY 30 MIN PRN
Status: DISCONTINUED | OUTPATIENT
Start: 2024-10-30 | End: 2024-10-30 | Stop reason: HOSPADM

## 2024-10-30 RX ORDER — ACETAMINOPHEN 650 MG/1
650 SUPPOSITORY RECTAL ONCE
Status: COMPLETED | OUTPATIENT
Start: 2024-10-30 | End: 2024-10-30

## 2024-10-30 RX ORDER — OXYCODONE HYDROCHLORIDE 5 MG/1
5 TABLET ORAL EVERY 6 HOURS PRN
Qty: 6 TABLET | Refills: 0 | Status: SHIPPED | OUTPATIENT
Start: 2024-10-30

## 2024-10-30 RX ORDER — CEFAZOLIN SODIUM/WATER 2 G/20 ML
2 SYRINGE (ML) INTRAVENOUS SEE ADMIN INSTRUCTIONS
Status: DISCONTINUED | OUTPATIENT
Start: 2024-10-30 | End: 2024-10-30 | Stop reason: HOSPADM

## 2024-10-30 RX ORDER — NALOXONE HYDROCHLORIDE 0.4 MG/ML
0.1 INJECTION, SOLUTION INTRAMUSCULAR; INTRAVENOUS; SUBCUTANEOUS
Status: DISCONTINUED | OUTPATIENT
Start: 2024-10-30 | End: 2024-10-30 | Stop reason: HOSPADM

## 2024-10-30 RX ORDER — ONDANSETRON 4 MG/1
4 TABLET, ORALLY DISINTEGRATING ORAL EVERY 30 MIN PRN
Status: DISCONTINUED | OUTPATIENT
Start: 2024-10-30 | End: 2024-10-30 | Stop reason: HOSPADM

## 2024-10-30 RX ORDER — FENTANYL CITRATE 0.05 MG/ML
25 INJECTION, SOLUTION INTRAMUSCULAR; INTRAVENOUS EVERY 5 MIN PRN
Status: DISCONTINUED | OUTPATIENT
Start: 2024-10-30 | End: 2024-10-30 | Stop reason: HOSPADM

## 2024-10-30 RX ORDER — ONDANSETRON 2 MG/ML
INJECTION INTRAMUSCULAR; INTRAVENOUS PRN
Status: DISCONTINUED | OUTPATIENT
Start: 2024-10-30 | End: 2024-10-30

## 2024-10-30 RX ORDER — LIDOCAINE HYDROCHLORIDE 20 MG/ML
INJECTION, SOLUTION INFILTRATION; PERINEURAL PRN
Status: DISCONTINUED | OUTPATIENT
Start: 2024-10-30 | End: 2024-10-30

## 2024-10-30 RX ORDER — DEXAMETHASONE SODIUM PHOSPHATE 4 MG/ML
4 INJECTION, SOLUTION INTRA-ARTICULAR; INTRALESIONAL; INTRAMUSCULAR; INTRAVENOUS; SOFT TISSUE
Status: DISCONTINUED | OUTPATIENT
Start: 2024-10-30 | End: 2024-10-30 | Stop reason: HOSPADM

## 2024-10-30 RX ORDER — HYDROMORPHONE HCL IN WATER/PF 6 MG/30 ML
0.2 PATIENT CONTROLLED ANALGESIA SYRINGE INTRAVENOUS EVERY 5 MIN PRN
Status: DISCONTINUED | OUTPATIENT
Start: 2024-10-30 | End: 2024-10-30 | Stop reason: HOSPADM

## 2024-10-30 RX ORDER — PROPOFOL 10 MG/ML
INJECTION, EMULSION INTRAVENOUS PRN
Status: DISCONTINUED | OUTPATIENT
Start: 2024-10-30 | End: 2024-10-30

## 2024-10-30 RX ORDER — HYDROMORPHONE HCL IN WATER/PF 6 MG/30 ML
0.4 PATIENT CONTROLLED ANALGESIA SYRINGE INTRAVENOUS EVERY 5 MIN PRN
Status: DISCONTINUED | OUTPATIENT
Start: 2024-10-30 | End: 2024-10-30 | Stop reason: HOSPADM

## 2024-10-30 RX ADMIN — ROCURONIUM BROMIDE 40 MG: 50 INJECTION, SOLUTION INTRAVENOUS at 10:31

## 2024-10-30 RX ADMIN — Medication 200 MG: at 10:57

## 2024-10-30 RX ADMIN — PHENYLEPHRINE HYDROCHLORIDE 0.5 MCG/KG/MIN: 10 INJECTION INTRAVENOUS at 10:42

## 2024-10-30 RX ADMIN — LIDOCAINE HYDROCHLORIDE 100 MG: 20 INJECTION, SOLUTION INFILTRATION; PERINEURAL at 10:31

## 2024-10-30 RX ADMIN — PHENYLEPHRINE HYDROCHLORIDE 100 MCG: 10 INJECTION INTRAVENOUS at 10:41

## 2024-10-30 RX ADMIN — FENTANYL CITRATE 50 MCG: 50 INJECTION INTRAMUSCULAR; INTRAVENOUS at 10:31

## 2024-10-30 RX ADMIN — Medication 2 G: at 10:24

## 2024-10-30 RX ADMIN — ACETAMINOPHEN 975 MG: 325 TABLET, FILM COATED ORAL at 08:38

## 2024-10-30 RX ADMIN — ALBUMIN (HUMAN): 12.5 SOLUTION INTRAVENOUS at 10:36

## 2024-10-30 RX ADMIN — FENTANYL CITRATE 50 MCG: 50 INJECTION INTRAMUSCULAR; INTRAVENOUS at 10:35

## 2024-10-30 RX ADMIN — DEXAMETHASONE SODIUM PHOSPHATE 8 MG: 4 INJECTION, SOLUTION INTRA-ARTICULAR; INTRALESIONAL; INTRAMUSCULAR; INTRAVENOUS; SOFT TISSUE at 10:36

## 2024-10-30 RX ADMIN — MIDAZOLAM 2 MG: 1 INJECTION INTRAMUSCULAR; INTRAVENOUS at 10:24

## 2024-10-30 RX ADMIN — ONDANSETRON 4 MG: 2 INJECTION INTRAMUSCULAR; INTRAVENOUS at 10:52

## 2024-10-30 RX ADMIN — SODIUM CHLORIDE, POTASSIUM CHLORIDE, SODIUM LACTATE AND CALCIUM CHLORIDE: 600; 310; 30; 20 INJECTION, SOLUTION INTRAVENOUS at 10:24

## 2024-10-30 RX ADMIN — PROPOFOL 200 MG: 10 INJECTION, EMULSION INTRAVENOUS at 10:31

## 2024-10-30 RX ADMIN — PROPOFOL 50 MCG/KG/MIN: 10 INJECTION, EMULSION INTRAVENOUS at 10:31

## 2024-10-30 ASSESSMENT — ACTIVITIES OF DAILY LIVING (ADL)
ADLS_ACUITY_SCORE: 0

## 2024-10-30 ASSESSMENT — LIFESTYLE VARIABLES: TOBACCO_USE: 1

## 2024-10-30 NOTE — ANESTHESIA CARE TRANSFER NOTE
Patient: Mayda Lezama    Procedure: Procedure(s):  Cystoscopy, transurethral resection bladder tumor       Diagnosis: Malignant neoplasm of urinary bladder, unspecified site (H) [C67.9]  Diagnosis Additional Information: No value filed.    Anesthesia Type:   General     Note:    Oropharynx: oropharynx clear of all foreign objects and spontaneously breathing  Level of Consciousness: drowsy  Oxygen Supplementation: face mask  Level of Supplemental Oxygen (L/min / FiO2): 6  Independent Airway: airway patency satisfactory and stable  Dentition: dentition unchanged  Vital Signs Stable: post-procedure vital signs reviewed and stable  Report to RN Given: handoff report given  Patient transferred to: PACU  Comments: Patient comfortable  Handoff Report: Identifed the Patient, Identified the Reponsible Provider, Reviewed the pertinent medical history, Discussed the surgical course, Reviewed Intra-OP anesthesia mangement and issues during anesthesia, Set expectations for post-procedure period and Allowed opportunity for questions and acknowledgement of understanding      Vitals:  Vitals Value Taken Time   /71 10/30/24 1110   Temp     Pulse 86 10/30/24 1112   Resp 23 10/30/24 1112   SpO2 100 % 10/30/24 1112   Vitals shown include unfiled device data.    Electronically Signed By: ABRIL Phipps CRNA  October 30, 2024  11:13 AM

## 2024-10-30 NOTE — ANESTHESIA PROCEDURE NOTES
Airway       Patient location during procedure: OR       Procedure Start/Stop Times: 10/30/2024 10:35 AM  Staff -        CRNA: Pattie Mckay APRN CRNA       Performed By: CRNA  Consent for Airway        Urgency: elective  Indications and Patient Condition       Indications for airway management: jim-procedural       Induction type:intravenous      Final Airway Details       Final airway type: endotracheal airway       Successful airway: ETT - single  Endotracheal Airway Details        ETT size (mm): 7.0       Cuffed: yes       Successful intubation technique: direct laryngoscopy       DL Blade Type: Graham 2       Grade View of Cords: 1       Adjucts: stylet       Position: Right       Measured from: gums/teeth       Secured at (cm): 20       Bite block used: None    Post intubation assessment        Placement verified by: capnometry, equal breath sounds and chest rise        Number of attempts at approach: 1       Number of other approaches attempted: 0       Secured with: tape       Ease of procedure: easy       Dentition: Intact and Unchanged    Medication(s) Administered   Medication Administration Time: 10/30/2024 10:35 AM

## 2024-10-30 NOTE — ANESTHESIA PREPROCEDURE EVALUATION
Anesthesia Pre-Procedure Evaluation    Patient: Mayda Lezama   MRN: [id number] : 1965        Procedure : Procedure(s):  Cystoscopy, transurethral resection bladder tumor          Past Medical History:   Diagnosis Date    Anemia     Anxiety     Bicuspid aortic valve     Bladder perforation, intraoperative     CHF (congestive heart failure) (H)     Gross hematuria     History of aortic stenosis     HTN (hypertension)     Hyperlipidemia     Ileus (H)     Migraine       Past Surgical History:   Procedure Laterality Date    BREAST AUGMENTATION      CV CORONARY ANGIOGRAM N/A 2021    Procedure: Coronary Angiogram;  Surgeon: Hira Greenfield MD;  Location:  HEART CARDIAC CATH LAB    CV RIGHT AND LEFT HEART CATH N/A 2021    Procedure: CV RIGHT AND LEFT HEART CATH;  Surgeon: Hira Greenfield MD;  Location:  HEART CARDIAC CATH LAB    CYSTOSCOPY W/ EVACUATION BLADDER CLOTS  2023    REPLACE VALVE AORTIC N/A 2021    Procedure: AORTIC VALVE REPLACEMENT WITH MECHANICAL AORTIC HEART VALVE [facility]   SIZE: 23MM, AND ON CARDIOPULMONARY PUMP OXYGENATOR  (INTRAOPERATIVE TRANSESOPHAGEAL ECHOCARDIOGRAM BY ANESTHESIOLOGIST)   ;  Surgeon: Stephanie Montaño MD;  Location:  OR    TRANSURETHRAL RESECTION OF BLADDER  2023      Allergies   Allergen Reactions    Metoclopramide      Agitation      Social History     Tobacco Use    Smoking status: Every Day     Current packs/day: 0.25     Average packs/day: 0.3 packs/day for 30.0 years (7.5 ttl pk-yrs)     Types: Cigarettes     Start date: 10/29/1994    Smokeless tobacco: Never    Tobacco comments:     2 packs a week   Substance Use Topics    Alcohol use: Yes     Comment: 2-3 times a week      Wt Readings from Last 1 Encounters:   10/30/24 57.8 kg (127 lb 6.4 oz)        Anesthesia Evaluation            ROS/MED HX  ENT/Pulmonary:     (+)                tobacco use, Current use,                       Neurologic:     (+)       migraines,                          Cardiovascular:     (+) Dyslipidemia hypertension- -   -  - -      CHF                     valvular problems/murmurs type: AS AS with bicuspid aortic valve s/p AVR 2021.    Previous cardiac testing   Echo: Date: 1/2/22 Results:  Complete Echo Adult.  ______________________________________________________________________________  Interpretation Summary     Left ventricular systolic function is mildly reduced.The visual ejection  fraction is 45-50%.  Mildly decreased right ventricular systolic function  There is a mechanical aortic valve.The gradient is normal for this prosthetic  aortic valve.There is trace aortic regurgitation.     Echo dated 08/16/2021 LVEF 25-30% with severe aortic valve stenosis.  ______________________________________________________________________________  Left Ventricle  The left ventricle is normal in size. There is normal left ventricular wall  thickness. Diastolic Doppler findings (E/E' ratio and/or other parameters)  suggest left ventricular filling pressures are increased. Left ventricular  systolic function is mildly reduced. The visual ejection fraction is 45-50%.  Septal motion is consistent with post-operative state. There is mild global  hypokinesia of the left ventricle.     Right Ventricle  The right ventricle is normal size. Mildly decreased right ventricular  systolic function.     Atria  The left atrium is mildly dilated. Right atrial size is normal. There is no  color Doppler evidence of an atrial shunt.     Mitral Valve  There is trace mitral regurgitation.     Tricuspid Valve  There is trace tricuspid regurgitation. The right ventricular systolic  pressure is approximated at 7.2 mmHg plus the right atrial pressure.     Aortic Valve  There is trace aortic regurgitation. There is a mechanical aortic valve. The  prosthetic aortic valve is not well visualized. The gradient is normal for  this prosthetic aortic valve.     Pulmonic Valve  The  "pulmonic valve is not well visualized. There is no pulmonic valvular  stenosis.     Vessels  The aortic root is normal size. The inferior vena cava was normal in size with  preserved respiratory variability.     Pericardium  There is no pericardial effusion.     Rhythm  Sinus rhythm was noted.    Stress Test:  Date: Results:    ECG Reviewed:  Date: Results:    Cath:  Date: Results:      METS/Exercise Tolerance:     Hematologic:       Musculoskeletal:       GI/Hepatic:       Renal/Genitourinary:       Endo:       Psychiatric/Substance Use:       Infectious Disease:       Malignancy:   (+) Malignancy (bladder tumor),     Other:            Physical Exam    Airway        Mallampati: II   TM distance: > 3 FB   Neck ROM: full   Mouth opening: > 3 cm    Respiratory Devices and Support         Dental  no notable dental history         Cardiovascular          Rhythm and rate: regular and normal     Pulmonary   pulmonary exam normal                OUTSIDE LABS:  CBC:   Lab Results   Component Value Date    WBC 6.0 08/24/2021    WBC 7.7 08/23/2021    HGB 9.2 (L) 08/24/2021    HGB 9.6 (L) 08/23/2021    HCT 27.5 (L) 08/24/2021    HCT 28.3 (L) 08/23/2021     08/24/2021     08/23/2021     BMP:   Lab Results   Component Value Date     (L) 08/24/2021     (L) 08/23/2021    POTASSIUM 3.6 08/24/2021    POTASSIUM 3.6 08/23/2021    CHLORIDE 99 08/24/2021    CHLORIDE 98 08/23/2021    CO2 27 08/24/2021    CO2 27 08/23/2021    BUN 9 08/24/2021    BUN 9 08/23/2021    CR 0.63 08/24/2021    CR 0.63 08/23/2021    GLC 88 08/24/2021     (H) 08/23/2021     COAGS:   Lab Results   Component Value Date     (H) 08/19/2021    INR 0.96 10/30/2024    FIBR 291 08/19/2021     POC: No results found for: \"BGM\", \"HCG\", \"HCGS\"  HEPATIC:   Lab Results   Component Value Date    ALBUMIN 2.9 (L) 08/20/2021    PROTTOTAL 5.3 (L) 08/20/2021    ALT 37 08/20/2021    AST 65 (H) 08/20/2021    ALKPHOS 59 08/20/2021    BILITOTAL 0.9 " 08/20/2021     OTHER:   Lab Results   Component Value Date    PH 7.36 08/19/2021    LACT 0.8 08/20/2021    A1C 5.0 08/16/2021    KARLY 8.7 08/24/2021    PHOS 3.8 08/24/2021    MAG 2.0 08/24/2021    LIPASE 133 08/15/2021    TSH 2.77 08/21/2021       Anesthesia Plan    ASA Status:  3    NPO Status:  NPO Appropriate    Anesthesia Type: General.     - Airway: ETT   Induction: Intravenous, Propofol.   Maintenance: Balanced.        Consents    Anesthesia Plan(s) and associated risks, benefits, and realistic alternatives discussed. Questions answered and patient/representative(s) expressed understanding.     - Discussed:     - Discussed with:  Patient            Postoperative Care    Pain management: IV analgesics.   PONV prophylaxis: Ondansetron (or other 5HT-3), Dexamethasone or Solumedrol, Background Propofol Infusion     Comments:    Other Comments: Surgeon requests muscle relaxation due to patient having hx of bladder perforation in the past.           Jason Thomas MD    I have reviewed the pertinent notes and labs in the chart from the past 30 days and (re)examined the patient.  Any updates or changes from those notes are reflected in this note.            # Drug Induced Coagulation Defect: home medication list includes an anticoagulant medication  # Drug Induced Platelet Defect: home medication list includes an antiplatelet medication

## 2024-10-30 NOTE — DISCHARGE INSTRUCTIONS
Today you were given 975 mg of Tylenol at 8:30 AM. The recommended daily maximum dose is 4000 mg.         Same Day Surgery Discharge Instructions for  Sedation and General Anesthesia     It's not unusual to feel dizzy, light-headed or faint for up to 24 hours after surgery or while taking pain medication.  If you have these symptoms: sit for a few minutes before standing and have someone assist you when you get up to walk or use the bathroom.    You should rest and relax for the next 24 hours. We recommend you make arrangements to have an adult stay with you for at least 24 hours after your discharge.  Avoid hazardous and strenuous activity.    DO NOT DRIVE any vehicle or operate mechanical equipment for 24 hours following the end of your surgery.  Even though you may feel normal, your reactions may be affected by the medication you have received.    Do not drink alcoholic beverages for 24 hours following surgery.     Slowly progress to your regular diet as you feel able. It's not unusual to feel nauseated and/or vomit after receiving anesthesia.  If you develop these symptoms, drink clear liquids (apple juice, ginger ale, broth, 7-up, etc. ) until you feel better.  If your nausea and vomiting persists for 24 hours, please notify your surgeon.      All narcotic pain medications, along with inactivity and anesthesia, can cause constipation. Drinking plenty of liquids and increasing fiber intake will help.    For any questions of a medical nature, call your surgeon.    Do not make important decisions for 24 hours.    If you had general anesthesia, you may have a sore throat for a couple of days related to the breathing tube used during surgery.  You may use Cepacol lozenges to help with this discomfort.  If it worsens or if you develop a fever, contact your surgeon.     If you feel your pain is not well managed with the pain medications prescribed by your surgeon, please contact your surgeon's office to let them know  so they can address your concerns.           **If you have questions or concerns about your procedure,  call Dr. Jett at [phone]**

## 2024-10-30 NOTE — ANESTHESIA POSTPROCEDURE EVALUATION
Patient: Mayda Lezama    Procedure: Procedure(s):  Cystoscopy, transurethral resection bladder tumor       Anesthesia Type:  General    Note:  Disposition: Outpatient   Postop Pain Control: Uneventful            Sign Out: Well controlled pain   PONV: No   Neuro/Psych: Uneventful            Sign Out: Acceptable/Baseline neuro status   Airway/Respiratory: Uneventful            Sign Out: Acceptable/Baseline resp. status   CV/Hemodynamics: Uneventful            Sign Out: Acceptable CV status   Other NRE: NONE   DID A NON-ROUTINE EVENT OCCUR? No           Last vitals:  Vitals Value Taken Time   /70 10/30/24 1130   Temp 36.6  C (97.8  F) 10/30/24 1110   Pulse 84 10/30/24 1130   Resp 23 10/30/24 1130   SpO2 91 % 10/30/24 1130   Vitals shown include unfiled device data.    Electronically Signed By: Jason Thomas MD  October 30, 2024  12:11 PM

## 2024-10-30 NOTE — PROCEDURES
DATE OF SERVICE: 10/30/24      SURGEON:   Alpesh Jett MD     PREOPERATIVE DIAGNOSIS: Bladder cancer     POSTOPERATIVE DIAGNOSIS: Bladder cancer     PROCEDURE PERFORMED:   Transurethral resection of bladder tumor, medium     ANESTHESIA:  General.     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  She initially presented with large low-grade noninvasive tumor.  This was apparently complicated by bladder perforation.  It was done at an outside hospital.  Recent office cystoscopy revealed recurrence.  She presents today for management of recurrence.     DESCRIPTION OF PROCEDURE:  After properly identifying the patient and verifying informed consent, she was brought to the operating room in stable condition.  After sufficient induction of general anesthetic, she was placed in the lithotomy position.  Her perineum and genitalia were prepped and draped in the usual fashion.  The case was begun by introducing the 22Fr rigid cystoscope.  The urethra was within normal limits.  The bladder was surveyed in its entirety and notable for multifocal low-grade appearing papillary tumors.   The largest concentration of tumors was at the anterior dome toward the right side.  This was covering an area approximately 2 to 3 cm.  There were additional small papillary tumors in the left trigone and proximal to the left ureteral orifice.  I used the cold cup biopsy forceps to carefully resect the tumor at the dome.  I then used the 26 [place] continuous-flow resectoscope with bipolar rollerball electrode to fulgurate the base of the resection as well as the tiny tumors in the left trigone and near the left ureteral orifice.     The specimen was removed and sent to pathology.  The bladder was then emptied and surveyed.  All visible tumor was resected, hemostasis was excellent and there was no evidence of bladder perforation.      At this point, the  patient was awakened from anesthesia and brought to recovery in stable condition.  There was minimal blood loss and no apparent complications.       FOLLOWUP:    The patient will be discharged today.  Due to multifocal recurrence, she should likely undergo either intravesical chemotherapy or BCG to lower risk of further recurrence.  We will set up an office visit in about 2 weeks for further discussion of this.

## 2024-11-03 LAB
PATH REPORT.COMMENTS IMP SPEC: ABNORMAL
PATH REPORT.COMMENTS IMP SPEC: YES
PATH REPORT.FINAL DX SPEC: ABNORMAL
PATH REPORT.GROSS SPEC: ABNORMAL
PATH REPORT.MICROSCOPIC SPEC OTHER STN: ABNORMAL
PATH REPORT.RELEVANT HX SPEC: ABNORMAL
PHOTO IMAGE: ABNORMAL

## 2024-12-28 ENCOUNTER — HOSPITAL ENCOUNTER (EMERGENCY)
Facility: CLINIC | Age: 59
Discharge: HOME | End: 2024-12-29
Attending: EMERGENCY MEDICINE
Payer: COMMERCIAL

## 2024-12-28 DIAGNOSIS — E87.29 HIGH ANION GAP METABOLIC ACIDOSIS: ICD-10-CM

## 2024-12-28 DIAGNOSIS — F10.129 ALCOHOL ABUSE WITH INTOXICATION: ICD-10-CM

## 2024-12-28 DIAGNOSIS — R79.1 SUPRATHERAPEUTIC INR: ICD-10-CM

## 2024-12-28 DIAGNOSIS — F32.A DEPRESSION, UNSPECIFIED DEPRESSION TYPE: ICD-10-CM

## 2024-12-28 LAB
ALBUMIN SERPL BCG-MCNC: 4.2 G/DL (ref 3.5–5.2)
ALP SERPL-CCNC: 99 U/L (ref 40–150)
ALT SERPL W P-5'-P-CCNC: 37 U/L (ref 0–50)
ANION GAP SERPL CALCULATED.3IONS-SCNC: 18 MMOL/L (ref 7–15)
AST SERPL W P-5'-P-CCNC: 70 U/L (ref 0–45)
BASOPHILS # BLD AUTO: 0 10E3/UL (ref 0–0.2)
BASOPHILS NFR BLD AUTO: 0 %
BILIRUB SERPL-MCNC: 0.4 MG/DL
BUN SERPL-MCNC: 14.5 MG/DL (ref 8–23)
CALCIUM SERPL-MCNC: 8.7 MG/DL (ref 8.8–10.4)
CHLORIDE SERPL-SCNC: 94 MMOL/L (ref 98–107)
CREAT SERPL-MCNC: 0.57 MG/DL (ref 0.51–0.95)
EGFRCR SERPLBLD CKD-EPI 2021: >90 ML/MIN/1.73M2
EOSINOPHIL # BLD AUTO: 0 10E3/UL (ref 0–0.7)
EOSINOPHIL NFR BLD AUTO: 0 %
ERYTHROCYTE [DISTWIDTH] IN BLOOD BY AUTOMATED COUNT: 13.5 % (ref 10–15)
ETHANOL SERPL-MCNC: 0.32 G/DL
FLUAV RNA SPEC QL NAA+PROBE: NEGATIVE
FLUBV RNA RESP QL NAA+PROBE: NEGATIVE
GLUCOSE SERPL-MCNC: 85 MG/DL (ref 70–99)
HCO3 SERPL-SCNC: 21 MMOL/L (ref 22–29)
HCT VFR BLD AUTO: 42.3 % (ref 35–47)
HGB BLD-MCNC: 15 G/DL (ref 11.7–15.7)
IMM GRANULOCYTES # BLD: 0 10E3/UL
IMM GRANULOCYTES NFR BLD: 0 %
INR PPP: 5.87 (ref 0.85–1.15)
LIPASE SERPL-CCNC: 153 U/L (ref 13–60)
LYMPHOCYTES # BLD AUTO: 2 10E3/UL (ref 0.8–5.3)
LYMPHOCYTES NFR BLD AUTO: 31 %
MAGNESIUM SERPL-MCNC: 2.1 MG/DL (ref 1.7–2.3)
MCH RBC QN AUTO: 34.8 PG (ref 26.5–33)
MCHC RBC AUTO-ENTMCNC: 35.5 G/DL (ref 31.5–36.5)
MCV RBC AUTO: 98 FL (ref 78–100)
MONOCYTES # BLD AUTO: 0.2 10E3/UL (ref 0–1.3)
MONOCYTES NFR BLD AUTO: 3 %
NEUTROPHILS # BLD AUTO: 4.3 10E3/UL (ref 1.6–8.3)
NEUTROPHILS NFR BLD AUTO: 65 %
NRBC # BLD AUTO: 0 10E3/UL
NRBC BLD AUTO-RTO: 0 /100
PLATELET # BLD AUTO: 239 10E3/UL (ref 150–450)
POTASSIUM SERPL-SCNC: 4.5 MMOL/L (ref 3.4–5.3)
PROT SERPL-MCNC: 7.2 G/DL (ref 6.4–8.3)
RBC # BLD AUTO: 4.31 10E6/UL (ref 3.8–5.2)
RSV RNA SPEC NAA+PROBE: NEGATIVE
SARS-COV-2 RNA RESP QL NAA+PROBE: NEGATIVE
SODIUM SERPL-SCNC: 133 MMOL/L (ref 135–145)
WBC # BLD AUTO: 6.6 10E3/UL (ref 4–11)

## 2024-12-28 PROCEDURE — 82077 ASSAY SPEC XCP UR&BREATH IA: CPT | Performed by: EMERGENCY MEDICINE

## 2024-12-28 PROCEDURE — 96361 HYDRATE IV INFUSION ADD-ON: CPT

## 2024-12-28 PROCEDURE — 99284 EMERGENCY DEPT VISIT MOD MDM: CPT | Mod: 25

## 2024-12-28 PROCEDURE — 258N000003 HC RX IP 258 OP 636: Performed by: EMERGENCY MEDICINE

## 2024-12-28 PROCEDURE — 85018 HEMOGLOBIN: CPT | Performed by: EMERGENCY MEDICINE

## 2024-12-28 PROCEDURE — 80053 COMPREHEN METABOLIC PANEL: CPT | Performed by: EMERGENCY MEDICINE

## 2024-12-28 PROCEDURE — 83690 ASSAY OF LIPASE: CPT | Performed by: EMERGENCY MEDICINE

## 2024-12-28 PROCEDURE — 96360 HYDRATION IV INFUSION INIT: CPT

## 2024-12-28 PROCEDURE — 83735 ASSAY OF MAGNESIUM: CPT | Performed by: EMERGENCY MEDICINE

## 2024-12-28 PROCEDURE — 85004 AUTOMATED DIFF WBC COUNT: CPT | Performed by: EMERGENCY MEDICINE

## 2024-12-28 PROCEDURE — 36415 COLL VENOUS BLD VENIPUNCTURE: CPT | Performed by: EMERGENCY MEDICINE

## 2024-12-28 PROCEDURE — 85610 PROTHROMBIN TIME: CPT | Performed by: EMERGENCY MEDICINE

## 2024-12-28 PROCEDURE — 87637 SARSCOV2&INF A&B&RSV AMP PRB: CPT | Performed by: EMERGENCY MEDICINE

## 2024-12-28 RX ADMIN — SODIUM CHLORIDE 1000 ML: 9 INJECTION, SOLUTION INTRAVENOUS at 22:37

## 2024-12-28 ASSESSMENT — ACTIVITIES OF DAILY LIVING (ADL)
ADLS_ACUITY_SCORE: 52

## 2024-12-28 ASSESSMENT — COLUMBIA-SUICIDE SEVERITY RATING SCALE - C-SSRS
6. HAVE YOU EVER DONE ANYTHING, STARTED TO DO ANYTHING, OR PREPARED TO DO ANYTHING TO END YOUR LIFE?: NO
1. IN THE PAST MONTH, HAVE YOU WISHED YOU WERE DEAD OR WISHED YOU COULD GO TO SLEEP AND NOT WAKE UP?: YES
5. HAVE YOU STARTED TO WORK OUT OR WORKED OUT THE DETAILS OF HOW TO KILL YOURSELF? DO YOU INTEND TO CARRY OUT THIS PLAN?: NO
3. HAVE YOU BEEN THINKING ABOUT HOW YOU MIGHT KILL YOURSELF?: NO
2. HAVE YOU ACTUALLY HAD ANY THOUGHTS OF KILLING YOURSELF IN THE PAST MONTH?: YES
6. HAVE YOU EVER DONE ANYTHING, STARTED TO DO ANYTHING, OR PREPARED TO DO ANYTHING TO END YOUR LIFE?: NO
1. IN THE PAST MONTH, HAVE YOU WISHED YOU WERE DEAD OR WISHED YOU COULD GO TO SLEEP AND NOT WAKE UP?: YES
3. HAVE YOU BEEN THINKING ABOUT HOW YOU MIGHT KILL YOURSELF?: NO
4. HAVE YOU HAD THESE THOUGHTS AND HAD SOME INTENTION OF ACTING ON THEM?: YES
2. HAVE YOU ACTUALLY HAD ANY THOUGHTS OF KILLING YOURSELF IN THE PAST MONTH?: YES

## 2024-12-29 VITALS
TEMPERATURE: 97.6 F | RESPIRATION RATE: 16 BRPM | SYSTOLIC BLOOD PRESSURE: 152 MMHG | HEART RATE: 75 BPM | OXYGEN SATURATION: 93 % | DIASTOLIC BLOOD PRESSURE: 102 MMHG

## 2024-12-29 PROBLEM — F10.929 ALCOHOL INTOXICATION (H): Status: ACTIVE | Noted: 2024-12-29

## 2024-12-29 PROBLEM — F41.9 ANXIETY DISORDER, UNSPECIFIED: Status: ACTIVE | Noted: 2024-12-29

## 2024-12-29 PROCEDURE — 250N000013 HC RX MED GY IP 250 OP 250 PS 637: Performed by: EMERGENCY MEDICINE

## 2024-12-29 RX ORDER — LORAZEPAM 1 MG/1
1 TABLET ORAL EVERY 8 HOURS PRN
Status: DISCONTINUED | OUTPATIENT
Start: 2024-12-29 | End: 2024-12-29 | Stop reason: HOSPADM

## 2024-12-29 RX ADMIN — LORAZEPAM 1 MG: 1 TABLET ORAL at 06:09

## 2024-12-29 ASSESSMENT — LIFESTYLE VARIABLES
TOTAL SCORE: 9
ORIENTATION AND CLOUDING OF SENSORIUM: ORIENTED AND CAN DO SERIAL ADDITIONS
PAROXYSMAL SWEATS: NO SWEAT VISIBLE
AGITATION: SOMEWHAT MORE THAN NORMAL ACTIVITY
AUDITORY DISTURBANCES: NOT PRESENT
NAUSEA AND VOMITING: MILD NAUSEA WITH NO VOMITING
HEADACHE, FULLNESS IN HEAD: NOT PRESENT
ANXIETY: 3
TREMOR: MODERATE, WITH PATIENT'S ARMS EXTENDED
VISUAL DISTURBANCES: NOT PRESENT

## 2024-12-29 ASSESSMENT — COLUMBIA-SUICIDE SEVERITY RATING SCALE - C-SSRS
TOTAL  NUMBER OF INTERRUPTED ATTEMPTS SINCE LAST CONTACT: NO
6. HAVE YOU EVER DONE ANYTHING, STARTED TO DO ANYTHING, OR PREPARED TO DO ANYTHING TO END YOUR LIFE?: NO
1. SINCE LAST CONTACT, HAVE YOU WISHED YOU WERE DEAD OR WISHED YOU COULD GO TO SLEEP AND NOT WAKE UP?: NO
ATTEMPT SINCE LAST CONTACT: NO
2. HAVE YOU ACTUALLY HAD ANY THOUGHTS OF KILLING YOURSELF?: NO
TOTAL  NUMBER OF ABORTED OR SELF INTERRUPTED ATTEMPTS SINCE LAST CONTACT: NO
SUICIDE, SINCE LAST CONTACT: NO

## 2024-12-29 ASSESSMENT — ACTIVITIES OF DAILY LIVING (ADL)
ADLS_ACUITY_SCORE: 52

## 2024-12-29 NOTE — ED TRIAGE NOTES
Pt currently on chemo for bladder ca, last chemo on Thursday.  brings pt in today c/o generalized weakness, loss of apetite, nausea.  also concerned that patient has been skipping coumadin -  also believes patient has been sneaking vodka. Pt also endorsing suicidal ideations     Triage Assessment (Adult)       Row Name 12/28/24 2041          Respiratory WDL    Respiratory WDL WDL        Cardiac WDL    Cardiac WDL WDL        Cognitive/Neuro/Behavioral WDL    Cognitive/Neuro/Behavioral WDL WDL                      VITAL SIGNS: I have reviewed nursing notes and confirm.  CONSTITUTIONAL: well-appearing, non-toxic, NAD  SKIN: Warm dry, normal skin turgor, no acute rash, no bruising  HEAD: NCAT  EYES: EOMI, PERRLA, no scleral icterus, normal conjunctiva  ENT: Dry mucous membranes, OR clear. Normal pharynx  NECK: Supple; non tender. Full ROM. No cervical LAD  CARD: RRR, no murmurs, rubs or gallops  RESP: clear to ausculation b/l.  No rales, rhonchi, or wheezing. No increased WOB.  ABD: soft, + BS, non-tender, non-distended, no rebound or guarding. No CVA tenderness  EXT: Full ROM, no bony tenderness, pulses intact in bilateral UE and LE, no pedal edema, no calf tenderness  NEURO: normal motor. normal sensory. Normal gait.  PSYCH: Cooperative, appropriate.

## 2024-12-29 NOTE — PROGRESS NOTES
"  Triage and Transition Services Extended Care Reassessment     Patient: Mayda goes by \"Mayda,\" uses she/her pronouns  Date of Service: December 29, 2024  Site of Service: [facility] EMERGENCY DEPT                               Patient was seen yes  Mode of Assessment: In person     Reason for Reassessment: other (see comment) decrease in sx, Pt request.     History of Patient's Original Emergency Room Encounter: Pt reports she does not have a history of mental health diagnosis. Pt states that she does not have outpatient providers. She does not take medication for mental health. Pt denies history of hospitalization for mental health. She admits to alcohol use, a few drinks a day. Has been drinking for the past 2 years. Went to treatment many years ago. Pt denies history of suicide attempt.    Presentation Summary: Pt presented with a somewhat anxious and irritable affect. Pts mood was congruent with this presentation. Pt was oriented x4. Pt was engaged and cooperative during assessment. Pt endorsed several frustrations with being in the hospital and endorsed confusion with why she was here, when writer explained there were concerns about Pts alcohol use and family concern about SI. Pt endorsed that she did not remember making any suicidal statements last night and expressed worry that her  was \"lying\" about those statements. Pt ongoing marital discord recently. Pt endorsed that she has been drinking more and was open to referrals in the community. Pt endorsed a period of sobriety for about 12 years. Pt endorsed several health stressors. Pt denied any SI or plan or intent. Pt endorsed protective factors of her family and friends. Pt did not endorse any SIB/HI or AH/VH. Pt endorsed recently daily alcohol use but did not endorse any other substance use. Pt was able to safety plan with writer and appeared motivated to engage in outpatient supports.    Changes Observed Since Initial Assessment: " decrease in presenting symptoms, patient/family request    Therapeutic Interventions Provided: Engaged in safety planning, Engaged in cognitive restructuring/ reframing, looked at common cognitive distortions and challenged negative thoughts., Engaged in guided discovery, explored patient's perspectives and helped expand them through socratic dialogue., Reviewed healthy living that supports positive mental health, including looking at sleep hygiene, regular movement, nutrition, and regular socialization., Worked on relapse prevention planning (review of stressors, early warning signs, written plan to respond to signs, and rehearse plan)., Introduced and practiced Wise Mind    Current Symptoms: anxious sadness, irritable anxious   loss of appetite    Mental Status Exam   Affect: Appropriate  Appearance: Appropriate  Attention Span/Concentration: Attentive  Eye Contact: Engaged    Fund of Knowledge: Appropriate   Language /Speech Content: Fluent  Language /Speech Volume: Normal  Language /Speech Rate/Productions: Normal  Recent Memory: Intact  Remote Memory: Intact  Mood: Anxious  Orientation to Person: Yes   Orientation to Place: Yes  Orientation to Time of Day: Yes  Orientation to Date: Yes     Situation (Do they understand why they are here?): Yes  Psychomotor Behavior: Normal  Thought Content: Clear  Thought Form: Intact    Treatment Objective(s) Addressed: rapport building, identifying an appropriate aftercare plan, identifying additional supports, identifying treatment goals, safety planning, assessing safety, processing feelings    Patient Response to Interventions: acceptance expressed, verbalizes understanding    Progress Towards Goals:  Patient Reports Symptoms Are: ongoing  Patient Progress Toward Goals: is making progress  Comment: Pt has been somewhat receptive to ED interventions  Next Step to Work Toward Discharge: symptom stabilization  Symptom Stabilization Comment: Pt denied any SI/SIB/HI or AH/VH.  "Pt endorsed willingness to engage in outpatient supports    Case Management: Summary of Interaction: Pts signed AIDA for her  Jarad. Writer spoke with Pts  and he endorsed concern about Pts substance use and that she needs treatment, He also endorsed that he reached out to [facility] and they are \"pointless.\" He endorsed frustration with current outpatient referrals and concern for relapse. But he endorsed that he understands recommendations.  Pts  endorsed that Pt is concealing \"sneaking\" her alcohol use. Pt has endorsed \"I want to die\" but Pt has not expressed SI with plan or intent and there is no hx of suicide attempts. Pt has not endorsed HI or AH/VH.    C-SSRS Since Last Contact:   1. Wish to be Dead (Since Last Contact): No  2. Non-Specific Active Suicidal Thoughts (Since Last Contact): No     Actual Attempt (Since Last Contact): No  Has subject engaged in non-suicidal self-injurious behavior? (Since Last Contact): No  Interrupted Attempts (Since Last Contact): No  Aborted or Self-Interrupted Attempt (Since Last Contact): No  Preparatory Acts or Behavior (Since Last Contact): No  Suicide (Since Last Contact): No     Calculated C-SSRS Risk Score (Since Last Contact): No Risk Indicated    Plan: Final Disposition / Recommended Care Path: discharge  Plan for Care reviewed with assigned Medical Provider: yes  Plan for Care Team Review: provider, RN  Comments: pt had a BAL of .32 at 10:30PM  Patient and/or validated legal guardian concurs: yes  Clinical Substantiation: At this time IP MH admission is not being recommended due to Pt not endorsing any active SI/SIB/HI or AH/VH.  Pt was able to fully safety plan with writer. During current assessment Pt does not present with any modifiable risk factors that are likely to be mitigated in a hospital setting. Further, current sx and presentation are unlikely to be changed or alleviated by medication management or IP MH therapeutic interventions during " a brief hospital stay. Pt does appear to be at higher risk of death by suicide accidental or intentional due to mental health hx and substance use hx.  At this time IP  admission does not appear to be the most therapeutically beneficial intervention/ least restrictive level of care for Pt. Pt appears to be able to use and motivated to engage in supportive mental health/ community resources.  Pt was scheduled for a therapy intake and JOHNNY assessment.    Legal Status: Legal Status: Voluntary/Patient has signed consent for treatment    Session Status: Time session started: 0850  Time session ended: 0906  Session Duration (minutes): 16 minutes  Session Number: 1  Anticipated number of sessions or this episode of care: 1    Session Start Time: 0850  Session Stop Time: 0906  CPT codes: 89880 - Psychotherapy (with patient) - 30 (16-37*) min  Time Spent: 16 minutes      CPT code(s) utilized: 45413 - Psychotherapy (with patient) - 30 (16-37*) min    Diagnosis:   Patient Active Problem List   Diagnosis Code    Acute respiratory failure with hypoxia (H) J96.01    Acute congestive heart failure, unspecified heart failure type (H) I50.9    Bicuspid aortic valve Q23.81    Aortic valve stenosis I35.0    Alcohol abuse F10.10    S/P aortic valve replacement with metallic valve Z95.4    Transient hyperglycemia post procedure R73.9    Anxiety disorder, unspecified F41.9    Alcohol intoxication (H) F10.929       Primary Problem This Admission: Active Hospital Problems    *Anxiety disorder, unspecified      Alcohol intoxication (H)        Gerda Mitchell, [facility]   Licensed Mental Health Professional (LMHP), Extended Care  [phone]

## 2024-12-29 NOTE — ED NOTES
Hand off given to maikel CABRERA.    Precious Carl RN,.......................................... 12/29/2024   7:00 AM

## 2024-12-29 NOTE — ED PROVIDER NOTES
"  Emergency Department Note      History of Present Illness     Chief Complaint   Generalized Weakness      HPI   Mayda Lezama is a 59 year old female with history of mechanical AVR on Coumadin, alcohol abuse, and bladder cancer who presents with her  for generalized weakness. About 4 days ago she developed a \"cold\" with cough but no fever. Two days ago she had her usual chemotherapy for bladder cancer. She is brought by her  today as he is worried about poor oral intake, alcohol abuse, and concern she may not be taking her Coumadin. She refutes his concern she is drinking too much (despite him describing her hiding vodka in water bottles around the house) and reports she is taking her medications, though she agrees it would be good to check her INR. She reports nausea without vomiting resulting in her poor oral intake and reports she has been feeling generally unwell recently. She screened positive for suicidal ideation. When asked about this she denies current SI or plan but she has had thoughts recently. She does not want a mental health or chemical dependency assessment.    Independent Historian    as detailed above.    Review of External Notes   Pre-op note 10/10/24.    Past Medical History     Medical History and Problem List   Past Medical History:   Diagnosis Date    Anemia     Anxiety     Bicuspid aortic valve     Bladder perforation, intraoperative     CHF (congestive heart failure) (H)     Gross hematuria     History of aortic stenosis     HTN (hypertension)     Hyperlipidemia     Ileus (H)     Migraine      Medications   acetaminophen (TYLENOL) 325 MG tablet  aspirin (ASA) 81 MG chewable tablet  DULoxetine (CYMBALTA) 20 MG capsule  LORazepam (ATIVAN) 0.5 MG tablet  metoprolol tartrate (LOPRESSOR) 25 MG tablet  oxyCODONE (ROXICODONE) 5 MG tablet  predniSONE (DELTASONE) 5 MG tablet  warfarin ANTICOAGULANT (COUMADIN) 5 MG tablet    Surgical History   Past Surgical History: "   Procedure Laterality Date    BREAST AUGMENTATION      CV CORONARY ANGIOGRAM N/A 08/17/2021    Procedure: Coronary Angiogram;  Surgeon: Hira Greenfield MD;  Location:  HEART CARDIAC CATH LAB    CV RIGHT AND LEFT HEART CATH N/A 08/17/2021    Procedure: CV RIGHT AND LEFT HEART CATH;  Surgeon: Hira Greenfield MD;  Location:  HEART CARDIAC CATH LAB    CYSTOSCOPY W/ EVACUATION BLADDER CLOTS  12/2023    CYSTOSCOPY, TRANSURETHRAL RESECTION (TUR) TUMOR BLADDER, COMBINED N/A 10/30/2024    Procedure: Cystoscopy, transurethral resection bladder tumor;  Surgeon: Alpesh Jett MD;  Location:  OR    REPLACE VALVE AORTIC N/A 08/19/2021    Procedure: AORTIC VALVE REPLACEMENT WITH MECHANICAL AORTIC HEART VALVE [facility]   SIZE: 23MM, AND ON CARDIOPULMONARY PUMP OXYGENATOR  (INTRAOPERATIVE TRANSESOPHAGEAL ECHOCARDIOGRAM BY ANESTHESIOLOGIST)   ;  Surgeon: Stephanie Montaño MD;  Location:  OR    TRANSURETHRAL RESECTION OF BLADDER  12/2023       Physical Exam     Patient Vitals for the past 24 hrs:   BP Temp Pulse Resp SpO2   12/28/24 2248 -- -- -- -- 95 %   12/28/24 2133 -- -- -- -- 95 %   12/28/24 2132 132/44 -- 75 -- --   12/28/24 2040 121/74 97.6  F (36.4  C) 71 16 96 %     Physical Exam  General: Well-developed and well-nourished. Well appearing middle aged  woman. Cooperative but argumentative with .  Head:  Atraumatic.  Eyes:  Conjunctivae, lids, and sclerae are normal.  ENT:    Normal nose. Moist mucous membranes.  Neck:  Supple. Normal range of motion.  CV:  Regular rate and rhythm. Mechanical click.  Resp:  No respiratory distress. Clear to auscultation bilaterally without decreased breath sounds, wheezing, rales, or rhonchi.  GI:  Soft. Non-distended. Non-tender.    MS:  Normal ROM. No bilateral lower extremity edema.  Skin:  Warm. Non-diaphoretic. No pallor.  Neuro:  Awake. A&Ox3. Normal strength.  Psych: Dysphoric mood and angry affect. Normal  speech. No current suicidal thought content.   Vitals reviewed.    Diagnostics     Lab Results   Labs Ordered and Resulted from Time of ED Arrival to Time of ED Departure   COMPREHENSIVE METABOLIC PANEL - Abnormal       Result Value    Sodium 133 (*)     Potassium 4.5      Carbon Dioxide (CO2) 21 (*)     Anion Gap 18 (*)     Urea Nitrogen 14.5      Creatinine 0.57      GFR Estimate >90      Calcium 8.7 (*)     Chloride 94 (*)     Glucose 85      Alkaline Phosphatase 99      AST 70 (*)     ALT 37      Protein Total 7.2      Albumin 4.2      Bilirubin Total 0.4     LIPASE - Abnormal    Lipase 153 (*)    INR - Abnormal    INR 5.87 (*)    ETHYL ALCOHOL LEVEL - Abnormal    Alcohol ethyl 0.32 (*)    CBC WITH PLATELETS AND DIFFERENTIAL - Abnormal    WBC Count 6.6      RBC Count 4.31      Hemoglobin 15.0      Hematocrit 42.3      MCV 98      MCH 34.8 (*)     MCHC 35.5      RDW 13.5      Platelet Count 239      % Neutrophils 65      % Lymphocytes 31      % Monocytes 3      % Eosinophils 0      % Basophils 0      % Immature Granulocytes 0      NRBCs per 100 WBC 0      Absolute Neutrophils 4.3      Absolute Lymphocytes 2.0      Absolute Monocytes 0.2      Absolute Eosinophils 0.0      Absolute Basophils 0.0      Absolute Immature Granulocytes 0.0      Absolute NRBCs 0.0     MAGNESIUM - Normal    Magnesium 2.1     INFLUENZA A/B, RSV AND SARS-COV2 PCR - Normal    Influenza A PCR Negative      Influenza B PCR Negative      RSV PCR Negative      SARS CoV2 PCR Negative       ED Course      Medications Administered   Medications   LORazepam (ATIVAN) tablet 1 mg (has no administration in time range)   sodium chloride 0.9% BOLUS 1,000 mL (0 mLs Intravenous Stopped 12/29/24 0047)     ED Course   ED Course as of 12/29/24 0327   Sun Dec 29, 2024   1419 I updated the patient on my discussion with Twyla AZEVEDO, who did not feel she could make a recommendation because of an inadequate assessment because of intoxication.     Additional  Documentation  Social Determinants of Health: Social Connections/Isolation: supportive  and sister    Medical Decision Making / Diagnosis   ROSA Ohara is a 59 year old woman with bladder cancer who presents with her  who is concerned about her poor oral intake and alcohol abuse.  He is worried she may not be taking her Coumadin which she needs for mechanical heart valve.  She screens positive for suicidal ideation but denies this to me.  She is overall cooperative but argumentative with her  and generally hostile and angry.  Exam is otherwise unremarkable.    She wanted to leave but agreed to stay for an INR check.  This reveals supratherapeutic INR of 5.87.  She does not have any bleeding so we will monitor without indication for reversal at this time.  Labs are otherwise significant for an anion gap metabolic acidosis which I suspect is related to either starvation or alcoholic ketoacidosis as she reports poor oral intake.  She was given IV fluids.  Most notably is that BAL elevated at 0.32 despite her report she only drinks a couple of drinks of alcohol daily.     Given her positive suicide screen and alcohol intoxication, she was seen by DEC.  However, they did not feel they were able to perform an adequate assessment to make a recommendation due to her intoxication.  As such, I will place her on a health officer hold and allow her to sober so we can reevaluate for appropriate and safe disposition.  At the end of my shift she was endorsed to my partner, Dr. Linarse..    Disposition   Care of the patient was transferred to my colleague, Dr. Linares, pending DEC re-evaluation when sober.     Diagnosis     ICD-10-CM    1. Alcohol abuse with intoxication (H)  F10.129       2. Depression, unspecified depression type  F32.A       3. High anion gap metabolic acidosis  E87.29       4. Supratherapeutic INR  R79.1             Jennie Dixon MD  12/29/24 0718

## 2024-12-29 NOTE — ED NOTES
"Responded to pt's light. Pt asking what she is waiting for. Reiterated that DEC assessment has been ordered and will be completed this AM. Pt appears Tremulous buts denies nausea or headache. She states that she is \"starving\" but declines courtesy meal when offered. Pt advised a regular breakfast will be ordered. She states \"okay.\"    Precious Carl RN,.......................................... 12/29/2024   6:00 AM    "

## 2024-12-29 NOTE — ED NOTES
Pt's purse locked in room locker. Pt continues to ask 'when can I go home?' RN reiterated previous conversation about concerns for safety and KJ status. Pt declined offer of zofran, food, or water.

## 2024-12-29 NOTE — ED NOTES
"Call light answered, pt stating \"I want to go home\", pt arguing with  at bedside. RN informed patient MD will be in to see patient shortly, patient agreeable to waiting for MD.   "

## 2024-12-29 NOTE — PLAN OF CARE
Mayda MARADIAGA Teja  December 29, 2024  Plan of Care Hand-off Note     Patient Recommended Care Path: observation    Clinical Substantiation:  Patient will remain in the ED under observation for continued monitoring, treatment and therapeutic intervention of mental health symptoms. Pt is currently intoxicated making it difficult for pt to engage appropriately with safety planning and completing assessment. It is difficult to know what pts baseline is. Pt is requesting to discharge. Writer attempted to complete a safety plan with pt and when asking her about coping skills, pt stated, 'I don't know what you mean, I don't think I have any.' Writer provided a list and pt stated that 'I could go for a walk.' Pt did not seem in an appropriate mindset to engage in safety planning at this time. Pt is recommended to sober and be reassessed for appropriate disposition and safety planning.    Goals for crisis stabilization:  allow pt to sober and reassess safety/risk, engage in safety planning    Next steps for Care Team:  reassess for safety/risk, engage in safety planning, provide resources to pt    Treatment Objectives Addressed:  rapport building, identifying an appropriate aftercare plan, safety planning, assessing safety    Therapeutic Interventions:  Coached on coping techniques/relaxation skills to help improve distress tolerance and managing intense emotions.    Has a specific means been identified for suicidal.homicide actions: No  If yes, describe:    Explain action steps toward mitigation:    Document completion of mitigation action:    The follow up action still needed prior to discharge:      Patient coping skills attempted to reduce the crisis:  pt agreed to present to the ED         Collateral contact information:  Jarad, spouse, [phone]    Legal Status: Voluntary/Patient has signed consent for treatment                            Psychiatry Consult: not ordered at this time    Twyla Chadwick [facility],  LADC

## 2024-12-29 NOTE — CONSULTS
Diagnostic Evaluation Consultation  Crisis Assessment    Patient Name: Mayda Lezama  Age:  59 year old  Legal Sex: female  Gender Identity: female  Pronouns: she/her  Race: White  Ethnicity: Not  or   Language: English      Patient was assessed: Virtual: [employer]   Crisis Assessment Start Date: 12/28/24  Crisis Assessment Start Time: 1141  Crisis Assessment Stop Time: 0013  Patient location: [facility] EMERGENCY DEPT                             ED20    Referral Data and Chief Complaint  Mayda Lezama presents to the ED with family/friends. Patient is presenting to the ED for the following concerns: Substance use, Intoxication, Worsening psychosocial stress. Factors that make the mental health crisis life threatening or complex are: Of note: Pt had a BAL of .32 as of 10:30PM. DEC was ordered at 10:55PM. Writer began assessment at 11:41PM. During assessment, pt was still significantly intoxicated. She would ask for questions to be repeated or state that she did not understand the question being asked. Pt appeared to minimize her substance use. At the time of this assessment, writer is not able to make appropriate recommendation without baseline knowledge of pts insight into her drinking and suicidal ideation. Information from collateral differs from pts report regarding substance use and pts ability to care for self.     Pt presents to the ED for mental health evaluation and generalized weakness. Pt states that her  wanted her to come in to the ED tonight. She states that she has been ill recently. She had chemo on Thursday and has hardly eaten anything. She states that she has had 'all kinds of issues,' including chemo treatment, open heart surgery. Pt denies SI and denies a history of SI. She states that she 'has had a lot.' She does not know why anyone would be concerned about her or think that she has made suicidal statements. Pt endorses anxiety symptoms of worrying,  racing thoughts and panic attacks. Pt states that she is anxious 'all the time.' Pt acknowledges that drinking decreases anxiety.   She states that every few weeks she will have a panic attack. She is not able to identify a trigger. Pt states that she has physical issues currently and has gone through a lot, including, 2 cancer surgeries, 1 open heart surgeries and doing chemo right now for bladder cancer. When writer asked pt about her alcohol use, pt states that she has 'a few drinks a day.' Writer challenged this due to pts current BAL and that pt is able to have somewhat of a conversation. Pt would not expand how much she has been drinking or how often, she stated 'I don't understand what you're asking.' She states that she has been drinking for the past 2 years.   She is willing to cut back on her drinking. She doesn't think her family is aware of how much she is drinking. She is open to therapy referral and substance use resources. Pt is requesting to discharge. Writer attempted to complete a safety plan with pt and when asking her about coping skills, pt stated, 'I don't know what you mean, I don't think I have any.' Writer provided a list and pt stated that 'I could go for a walk.' Pt did not seem in an appropriate mindset to engage in safety planning at this time.      Informed Consent and Assessment Methods  Explained the crisis assessment process, including applicable information disclosures and limits to confidentiality, assessed understanding of the process, and obtained consent to proceed with the assessment.  Assessment methods included conducting a formal interview with patient, review of medical records, collaboration with medical staff, and obtaining relevant collateral information from family and community providers when available: done     History of the Crisis   Pt reports she does not have a history of mental health diagnosis. Pt states that she does not have outpatient providers. She does not  take medication for mental health. Pt denies history of hospitalization for mental health. She admits to alcohol use, a few drinks a day. Has been drinking for the past 2 years. Went to treatment many years ago. Pt denies history of suicide attempt.    Brief Psychosocial History  Family:  , Children yes (pt states she has 2 children)  Support System:  , Sibling(s)  Employment Status:  employed full-time  Source of Income:  salary/wages  Financial Environmental Concerns:  none  Current Hobbies:  outdoor activities  Barriers in Personal Life:  medical conditions/precautions, other (see comments) (substance use)    Significant Clinical History  Current Anxiety Symptoms:  racing thoughts, excessive worry, anxious  Current Depression/Trauma:  irritable  Current Somatic Symptoms:  racing thoughts, excessive worry, anxious  Current Psychosis/Thought Disturbance:     Current Eating Symptoms:  loss of appetite (may also be due to chemotherapy pt is doing)  Chemical Use History:  Alcohol: Daily  Last Use:: 12/28/24  Benzodiazepines: None  Opiates: None  Cocaine: None  Marijuana: None  Other Use: None  Withdrawal Symptoms:  (pt denies withdrawal symptoms)   Past diagnosis:  No known past diagnosis  Family history:  No known history of mental health or chemical health concerns  Past treatment:  Other (pt states that she has attended substance use treatment 'years ago.')  Details of most recent treatment:  pt does not have outpatient mental health providers  Other relevant history:       Have there been any medication changes in the past two weeks:  patient is not on psychiatric meds       Is the patient compliant with medications:           Collateral Information  Is there collateral information: Yes     Collateral information name, relationship, phone number:  Jarad, spouse, [phone]    What happened today: drinking and lack of controlling her INR, she hasn't been taking her pills properly. she has been  hiding alcohol bottles around the house and filling them with water so they can't tell how much she's been drinking. he has been seeing pills in her pill bottles and concerned that she is not taking care of herself because of her substance use. she was sober for 12 years, relapsed and has been drinking 10 years. she went to treatment for alcohol use around 30 years ago.     What is different about patient's functioning: her drinking has increased. he states that her BAL continues to get higher when she goes in for procedures.     What do you think the patient needs:      Has patient made comments about wanting to kill themselves/others: yes    If d/c is recommended, can they take part in safety/aftercare planning:       Additional collateral information:  concerns about her alcohol use. he has been straightforward about his disapproval of use. she has bladder cancer and not taking care of herself. he would like for her to go to treatment.     Risk Assessment  [place] Suicide Severity Rating Scale Full Clinical Version:  Suicidal Ideation  Q1 Wish to be Dead (Lifetime): No  Q2 Non-Specific Active Suicidal Thoughts (Lifetime): No  Q6 Suicide Behavior (Lifetime): no     Suicidal Behavior (Lifetime)  Actual Attempt (Lifetime): No  Has subject engaged in non-suicidal self-injurious behavior? (Lifetime): No  Interrupted Attempts (Lifetime): No  Aborted or Self-Interrupted Attempt (Lifetime): No  Preparatory Acts or Behavior (Lifetime): No    [place] Suicide Severity Rating Scale Recent:   Suicidal Ideation (Recent)  Q1 Wished to be Dead (Past Month): no  Q2 Suicidal Thoughts (Past Month): no  Q3 Suicidal Thought Method: no  Q4 Suicidal Intent without Specific Plan: no  Q5 Suicide Intent with Specific Plan: no  Level of Risk per Screen: no risks indicated     Suicidal Behavior (Recent)  Actual Attempt (Past 3 Months): No  Has subject engaged in non-suicidal self-injurious behavior? (Past 3 Months): No  Interrupted  Attempts (Past 3 Months): No  Aborted or Self-Interrupted Attempt (Past 3 Months): No  Preparatory Acts or Behavior (Past 3 Months): No    Environmental or Psychosocial Events: other life stressors, ongoing abuse of substances  Protective Factors: Protective Factors: strong bond to family unit, community support, or employment, intact marriage or domestic partnership, lives in a responsibly safe and stable environment, responsibilities and duties to others, including pets and children, supportive ongoing medical and mental health care relationships    Does the patient have thoughts of harming others? Feels Like Hurting Others: no  Previous Attempt to Hurt Others: no  Is the patient engaging in sexually inappropriate behavior?: no  Does Patient have a known history of aggressive behavior: No  Has aggression occurred as a result of MH concerns/diagnosis: NA  Does patient have history of aggression in hospital: No    Is the patient engaging in sexually inappropriate behavior?  no        Mental Status Exam   Affect: Other (pt was intoxicated during assessment)  Appearance: Appropriate  Attention Span/Concentration: Other (please comment) (pt was intoxicated)  Eye Contact: Variable    Fund of Knowledge: Other (please comment) (unable to assess due to pt being significantly intoxicated at time of assessment)   Language /Speech Content: Other (please comment) (pt would slur her words at times, likely due to being intoxicated)  Language /Speech Volume: Normal  Language /Speech Rate/Productions: Normal  Recent Memory: Variable  Remote Memory: Variable  Mood: Normal  Orientation to Person: Yes   Orientation to Place: Yes  Orientation to Time of Day: Yes  Orientation to Date: Yes     Situation (Do they understand why they are here?): Yes  Psychomotor Behavior: Normal  Thought Content: Clear  Thought Form: Intact        Medication  Psychotropic medications:   Medication Orders - Psychiatric (From admission, onward)      None              Current Care Team  Patient Care Team:  Alaina Griffiths PCP - General (Internal Medicine)    Diagnosis  Patient Active Problem List   Diagnosis Code    Acute respiratory failure with hypoxia (H) J96.01    Acute congestive heart failure, unspecified heart failure type (H) I50.9    Bicuspid aortic valve Q23.81    Aortic valve stenosis I35.0    Alcohol abuse F10.10    S/P aortic valve replacement with metallic valve Z95.4    Transient hyperglycemia post procedure R73.9    Anxiety disorder, unspecified F41.9    Alcohol intoxication (H) F10.929       Primary Problem This Admission  Active Hospital Problems    *Anxiety disorder, unspecified      Alcohol intoxication (H)        Clinical Summary and Substantiation of Recommendations   Clinical Substantiation:  Patient will remain in the ED under observation for continued monitoring, treatment and therapeutic intervention of mental health symptoms. Pt is currently intoxicated making it difficult for pt to engage appropriately with safety planning and completing assessment. It is difficult to know what pts baseline is. Pt is requesting to discharge. Writer attempted to complete a safety plan with pt and when asking her about coping skills, pt stated, 'I don't know what you mean, I don't think I have any.' Writer provided a list and pt stated that 'I could go for a walk.' Pt did not seem in an appropriate mindset to engage in safety planning at this time. Pt is recommended to sober and be reassessed for appropriate disposition and safety planning.    Goals for crisis stabilization:  allow pt to sober and reassess safety/risk, engage in safety planning    Next steps for Care Team:  reassess for safety/risk, engage in safety planning, provide resources to pt    Treatment Objectives Addressed:  rapport building, identifying an appropriate aftercare plan, safety planning, assessing safety    Therapeutic Interventions:  Coached on coping techniques/relaxation  skills to help improve distress tolerance and managing intense emotions.    Has a specific means been identified for suicidal/homicide actions: No    If yes, describe:       Explain action steps toward mitigation:       Document completion of mitigation actions:       The follow up action still needed prior to discharge:       Patient coping skills attempted to reduce the crisis:  pt agreed to present to the ED    Disposition  Recommended referrals: [facility] Comprehensive [facility], Individual Therapy        Reviewed case and recommendations with attending provider. Attending Name: Dr. Dixon       Attending concurs with disposition: yes       Patient and/or validated legal guardian concurs with disposition:   yes       Final disposition:  observation      Legal status: Voluntary/Patient has signed consent for treatment                               Assessment Details   Total duration spent with the patient: 32 min     CPT code(s) utilized: 67263 - Psychotherapy for Crisis - 60 (30-74*) min    Twyla Chadwick, KYARAC, LADC, Psychotherapist  [facility] - Triage & Transition Services  Callback: [phone]

## 2024-12-29 NOTE — ED NOTES
"Patient does not remember the reason why she was brought here by her .  She is alerted, oriented x 3.  She still appears to be under the influence of alcohol, she pulled her iv, there was blood all over the room and bed.  She stated \" I was trying to put my jacket on\"  I reminded the patient she is not going to be discharge yet. Denies any suicidal or homicidal ideation.      "

## 2024-12-29 NOTE — ED NOTES
Pt and family updated on plan of care for the night. Pt angry, wants to go home. RN informed patient that if she was not willing to stay voluntarily, she would be placed on a hold d/t concerns for her safety. Pt began to swear at .  agreeable to the plan.

## 2024-12-29 NOTE — DISCHARGE INSTRUCTIONS
Contact your Coumadin/INR clinic to discuss dose changes for the INR that is too high today.  Return with any bleeding.    Your Upcoming Appointments    Type: Therapy - Initial (In-Person)  Date: Tuesday, 1/7/2025    Time: 5:00 pm - 6:00 pm  Provider: Viv BARAHONA  LICSW  Location: [facility], [address] [place] [address]  Phone: [phone]  Patient instructions: Please provide a current cell phone number and current email address. The patient must have an email address to fill out online forms. The patient can be seen via telehealth or in Viv's office in [place]; please note patient preference. Viv's native language is Maltese, and her English is also very good! Thanks so much! Website information: [email or web address]    -------------------------------------------------------------    Type: Substance Use Disorder Assessment  Date: Monday, 1/13/2025    Time: 9:00 am - 6:00 pm  Provider: Marichuy Farah PsyD, CNP,PMDANAP,RN  Location: [facility], [address] [place] [address]  Phone: [phone]  Patient instructions: Please complete New Patient Form by using following link: [email or web address]. All forms need to be completed 24 hours prior to the appointment date/time by going to [email or web address]. Please call us on  [phone]  24 hours prior to your scheduled appointment to confirm that you plan to attend. We will provide you information about how to log into video call when you [phone]        Mental health recommendations    Please follow-up with your outpatient team about your visit today.    2.  One of our care coordinators will reach out to you after your discharge.  If you have any questions about additional resources please call our coordinators at # [phone]. If you would like to schedule an appointment for therapy or psychiatry  please call our Behavioral  Access Scheduling office at [phone].      3.  Please use aftercare plan and already established coping skills and safety planning as needed.     4.  Please call 911 and/or return to the emergency department if her symptoms worsen or your safety becomes compromised. [facility] crisis line, [phone]        The following DBT skills can assist me when: I want to act on your emotions and acting on them will only make things worse, I am overwhelmed by my emotions, I want to try to be skillful and not act on self destructive behavior.     Reduce Extreme Emotion  QUICKLY:  Changing Your Body Chemistry       T:  Change your body Temperature to change your autonomic nervous system    Use Ice pack to calm yourself down FAST. Place ice pack underneath your eyes for a count of 30 seconds to initiate the divers reflex which will naturally calm down your heart rate and breathing.      I:  Intensely exercise to calm down a body revved up by emotion    Examples: running, walking fast, jumping, playing basketball, weight lifting, swimming, calisthenics, etc.      Engage in exercises that DO NOT include violent behaviors. Exercises that utilize violent behaviors tend to function as  behavioral rehearsal,  and rather than calming the person down, may actually  rev  the person up more, increasing the likelihood of violence, and lessening the likelihood that they will  burn off  energy     P:  Progressively relax your muscles    Starting with your hands, moving to your forearms, upper arms, shoulders, neck, forehead, eyes, cheeks and lips, tongue and teeth, chest, upper back, stomach, buttocks, thighs, calves, ankles, feet      Tense (10 seconds,   of the way), then relax each muscle (all the way)    Notice the tension    Notice the difference when relaxed (by tensing first, and then relaxing, you are able to get a more thorough relaxation than by simply relaxing)      P: Paced breathing to relax    The standard technique  is to begin with counting the number of steps one takes for a typical inhale, then counting the steps one takes for a typical exhale, and then lengthening the amount of steps for the exhalation by one or two steps.  OR repeat this pattern for 1-2 minutes:   Inhale for four (4) seconds    Exhale for six (6) to eight (8) seconds        After using Distress Tolerance TIPP, TRY TO STOP!     S- Stop    Do not just react on your emotion urge. Stop! Freeze! Do not move a muscle! Your emotions may try to make you act without thinking. Stay in control! Take a step back Take a step back from the situation.    T- Take a break    Let go. Take a deep breath. Do not let your feelings make you act impulsively.    O- Observe    Notice what is going on inside and outside you. What is the situation? What are your thoughts and feelings? What are others saying or doing? Does my emotion make sense, is it justified? What is it that my emotions want me to do? Would that be effective?    P- Proceed mindfully    Act with awareness. In deciding what to do, consider your thoughts and feelings, the situation, and other people s thoughts and feelings. Think about your goals. Ask Wise Mind: Which actions will make it better or worse?        If my emotion action urge would not be effective or helpful, practice acting OPPOSITE to the EMOTION ACTION URGE can help reduce the intensity or even change the emotion.   Consider these examples: with FEAR we have the urge to run away/avoid. OPPOSITE would be to approach it with caution. ANGER we have the urge to attack. OPPOSITE would be to gently avoid or to demonstrate kindness towards it. SADNESS we have the urge to withdraw/isolate. OPPOSITE would be to get self to move and be active physically or socially.      These additional skills may help with self-soothing and distracting you:      Activities   Focus attention on a task you need to get done. Rent movies; watch TV. Clean a room in your house.  Find an event to go to. Play computer games. Go walking. Exercise. Surf the Internet. Write e-mails. Play sports. Go out for a meal or eat a favorite food. Call or go out with a friend. Listen to your iPod; download music. Build something. Spend time with your children. Play cards. Read magazines, books, comics. Do crossword puzzles or Sudoku.     Emotions   Read emotional books or stories, old letters. Watch emotional TV shows; go to emotional movies. Listen to emotional music. (Be sure the event creates different emotions.) Ideas: Scary movies, joke books, comedies, funny records, Christianity music, soothing music or music that fires you up, going to a store and reading funny greeting cards.     Thoughts   Count to 10; count colors in a painting or poster or out the window; count anything. Repeat words to a song in your mind. Work puzzles. Watch TV or read.     Sensations   Squeeze a rubber ball very hard. Listen to very loud music. Hold ice in your hand or mouth. Go out in the rain or snow. Take a hot or cold shower.   Remember that you can use your 5 senses as helpful self-soothing tools!       I can help my own emotions by practicing the following to keep my emotional mind healthy and bring positive emotions:     The ABC PLEASE skill is about taking good care of ourselves so that we can take care of others. Also, an important component of DBT is to reduce our vulnerability. When we take good care of ourselves, we are less likely to be vulnerable to disease and emotional crisis.     ABC   A- Accumulate positive emotions by doing things that are pleasant.   B- Build mastery by doing things we enjoy. Whether it is reading, cooking, cleaning, fixing a car, working a cross word puzzle, or playing a musical instrument. Practice these things to  and in time we feel competent.   C- Kinards Ahead by rehearsing a plan ahead of time so that we can be prepared to cope skillfully. (Think of what makes situations  difficult, and what helps in those situations)      PLEASE   Treat Physical Illness and take medications as prescribed.   Balance eating in order to avoid mood swings.   Avoid mood-Altering substances and have mood control.   Maintain good sleep so you can enjoy your life.   Get exercise to maintain high spirits.        Aftercare Plan    If I am feeling unsafe or I am in a crisis, I will:   Contact my established care providers   Call the National Suicide Prevention Lifeline: 214.169.1079   Go to the nearest emergency room   Call 911   Your [facility] has a mental health crisis team you can call 24/7: [facility], [phone]    Warning signs that I or other people might notice when a crisis is developing for me: crying, feeling bad about self    Things I am able to do on my own to cope or help me feel better:   -Practice square breathing when I begin to feel anxious - in breath through the nose for the count   of 4 and the first line on the square. Out breath through the mouth for the count of 4 for the second line   of the square. Repeat to complete the square. Repeat the square as many times as needed.    Things that I am able to do with others to cope or help me feel better: -Use community resources, including hotline numbers, [facility] crisis and support meetings    Things I can use or do for distraction: -Distraction skills of: going for walks, watching TV, spending time outside, calling a friend or family member  -Download a meditation rupert and spend 15-20 minutes per day mediating/relaxing. Some apps to   download include: Calm, Headspace and Insight Timer. All 3 of these apps have free version    Changes I can make to support my mental health and wellness:   -Attend scheduled mental health therapy and psychiatric appointments and follow all recommendations  -Maintain a daily schedule/routine  -Practice deep breathing skills  -Abstain from all mood altering chemicals not currently prescribed to me     People in  "my life that I can ask for help: [employer] [place] [employer]ness (BETTYE)  902.550.3908 or 1.888.BETTYE.HELPS    Other things that are important when I m in crisis: -Commit to 30 minutes of self care daily - this can be as simple as taking a shower, going for a walk, cooking a meal, read, writing, etc        Crisis Lines  Crisis Text Line  Text 253838  You will be connected with a trained live crisis counselor to provide support.    Por espanol, texto  BRENNA a 852492 o texto a 442-AYUDAME en WhatsApp    National Hope Line  1.800.SUICIDE [3267036]      Community Resources  Fast Tracker  Linking people to mental health and substance use disorder resources  Messagemind.Pro Breath MD     [place] Mental [facility] Warm Line  Peer to peer support  Monday thru Saturday, 12 pm to 10 pm  911.535.6307 or 9.553.809.0208  Text \"Support\" to 57938    [employer] [place] on [employer]ness (BETTYE)  709.557.0264 or 1.888.BETTYE.HELPS        Mental Health Apps  My3  https://"[employer]pp.org/    VirtualHopeBox  https://Pinstant Karmaorg/apps/virtual-hope-box/      Additional Information  Today you were seen by a licensed mental health professional through Triage and Transition services, Behavioral Healthcare Providers ([facility])  for a crisis assessment in the Emergency Department at [facility].  It is recommended that you follow up with your established providers (psychiatrist, mental health therapist, and/or primary care doctor - as relevant) as soon as possible. Coordinators from [facility] will be calling you in the next 24-48 hours to ensure that you have the resources you need.  You can also contact [facility] coordinators directly at [phone]. You may have been scheduled for or offered an appointment with a mental health provider. [facility] maintains an extensive network of licensed behavioral health providers to connect patients with the services they need.  We do not charge providers a fee to participate in our referral network.  We match patients " with providers based on a patient's specific needs, insurance coverage, and location.  Our first effort will be to refer you to a provider within your care system, and will utilize providers outside your care system as needed.

## 2024-12-30 ENCOUNTER — TELEPHONE (OUTPATIENT)
Dept: BEHAVIORAL HEALTH | Facility: CLINIC | Age: 59
End: 2024-12-30
Payer: COMMERCIAL

## 2025-01-23 ENCOUNTER — HOSPITAL ENCOUNTER (EMERGENCY)
Facility: CLINIC | Age: 60
Discharge: HOME | End: 2025-01-23
Attending: PHYSICIAN ASSISTANT
Payer: COMMERCIAL

## 2025-01-23 ENCOUNTER — HOSPITAL ENCOUNTER (EMERGENCY)
Facility: CLINIC | Age: 60
End: 2025-01-23
Payer: COMMERCIAL

## 2025-01-23 VITALS
TEMPERATURE: 98.6 F | RESPIRATION RATE: 18 BRPM | DIASTOLIC BLOOD PRESSURE: 63 MMHG | HEART RATE: 106 BPM | OXYGEN SATURATION: 97 % | SYSTOLIC BLOOD PRESSURE: 136 MMHG

## 2025-01-23 DIAGNOSIS — R79.1 ELEVATED INR: ICD-10-CM

## 2025-01-23 LAB — INR PPP: 3.72 (ref 0.85–1.15)

## 2025-01-23 PROCEDURE — G0463 HOSPITAL OUTPT CLINIC VISIT: HCPCS | Performed by: PHYSICIAN ASSISTANT

## 2025-01-23 PROCEDURE — 36415 COLL VENOUS BLD VENIPUNCTURE: CPT | Performed by: PHYSICIAN ASSISTANT

## 2025-01-23 PROCEDURE — 85610 PROTHROMBIN TIME: CPT | Performed by: PHYSICIAN ASSISTANT

## 2025-01-23 PROCEDURE — 99213 OFFICE O/P EST LOW 20 MIN: CPT | Performed by: PHYSICIAN ASSISTANT

## 2025-01-23 ASSESSMENT — ACTIVITIES OF DAILY LIVING (ADL): ADLS_ACUITY_SCORE: 52

## 2025-01-23 ASSESSMENT — COLUMBIA-SUICIDE SEVERITY RATING SCALE - C-SSRS
2. HAVE YOU ACTUALLY HAD ANY THOUGHTS OF KILLING YOURSELF IN THE PAST MONTH?: NO
1. IN THE PAST MONTH, HAVE YOU WISHED YOU WERE DEAD OR WISHED YOU COULD GO TO SLEEP AND NOT WAKE UP?: NO
6. HAVE YOU EVER DONE ANYTHING, STARTED TO DO ANYTHING, OR PREPARED TO DO ANYTHING TO END YOUR LIFE?: NO

## 2025-01-23 NOTE — ED PROVIDER NOTES
History     Chief Complaint   Patient presents with    Labs Only     HPI  Mayda Lezama is a 59 year old female with history significant for static placed aortic valve replacement with metallic valve, alcohol use disorder currently who presented to [facility] yesterday for inpatient treatment for ETOH use presents to urgent care requesting INR check.  Patient reports that she most recently had iron 2 days ago.  She is unsure what reading was at that time or what her current dose of coumadin is.  Treatment facility staff requesting repeat reading today.  She denies any concerns for abnormal bleeding.  No ecchymosis, lacerations, abrasions, hematuria, melena, hematochezia.  No chest pain, palpitations, shortness of breath.      Allergies:  Allergies   Allergen Reactions    Metoclopramide      Agitation    Cheese GI Disturbance     Problem List:    Patient Active Problem List    Diagnosis Date Noted    Anxiety disorder, unspecified 12/29/2024     Priority: Medium    Alcohol intoxication 12/29/2024     Priority: Medium    Alcohol abuse 08/24/2021     Priority: Medium    S/P aortic valve replacement with metallic valve 08/24/2021     Priority: Medium    Transient hyperglycemia post procedure 08/24/2021     Priority: Medium    Aortic valve stenosis 08/16/2021     Priority: Medium    Acute respiratory failure with hypoxia (H) 08/15/2021     Priority: Medium    Acute congestive heart failure, unspecified heart failure type (H) 08/15/2021     Priority: Medium     Added automatically from request for surgery [id number]      Bicuspid aortic valve 08/15/2021     Priority: Medium     Added automatically from request for surgery [id number]        Past Medical History:    Past Medical History:   Diagnosis Date    Anemia     Anxiety     Bicuspid aortic valve     Bladder perforation, intraoperative     CHF (congestive heart failure) (H)     Gross hematuria     History of aortic stenosis     HTN (hypertension)     Hyperlipidemia      Ileus (H)     Migraine      Past Surgical History:    Past Surgical History:   Procedure Laterality Date    BREAST AUGMENTATION      CV CORONARY ANGIOGRAM N/A 08/17/2021    Procedure: Coronary Angiogram;  Surgeon: Hira Greenfield MD;  Location:  HEART CARDIAC CATH LAB    CV RIGHT AND LEFT HEART CATH N/A 08/17/2021    Procedure: CV RIGHT AND LEFT HEART CATH;  Surgeon: Hira Greenfield MD;  Location:  HEART CARDIAC CATH LAB    CYSTOSCOPY W/ EVACUATION BLADDER CLOTS  12/2023    CYSTOSCOPY, TRANSURETHRAL RESECTION (TUR) TUMOR BLADDER, COMBINED N/A 10/30/2024    Procedure: Cystoscopy, transurethral resection bladder tumor;  Surgeon: Alpesh Jett MD;  Location:  OR    REPLACE VALVE AORTIC N/A 08/19/2021    Procedure: AORTIC VALVE REPLACEMENT WITH MECHANICAL AORTIC HEART VALVE [facility]   SIZE: 23MM, AND ON CARDIOPULMONARY PUMP OXYGENATOR  (INTRAOPERATIVE TRANSESOPHAGEAL ECHOCARDIOGRAM BY ANESTHESIOLOGIST)   ;  Surgeon: Stephanie Montaño MD;  Location:  OR    TRANSURETHRAL RESECTION OF BLADDER  12/2023     Family History:    No family history on file.    Social History:  Marital Status:   [2]  Social History     Tobacco Use    Smoking status: Every Day     Current packs/day: 0.25     Average packs/day: 0.3 packs/day for 30.2 years (7.6 ttl pk-yrs)     Types: Cigarettes     Start date: 10/29/1994    Smokeless tobacco: Never    Tobacco comments:     2 packs a week   Substance Use Topics    Alcohol use: Yes     Comment: 2-3 times a week    Drug use: No      Medications:    acetaminophen (TYLENOL) 325 MG tablet  aspirin (ASA) 81 MG chewable tablet  DULoxetine (CYMBALTA) 20 MG capsule  LORazepam (ATIVAN) 0.5 MG tablet  metoprolol tartrate (LOPRESSOR) 25 MG tablet  oxyCODONE (ROXICODONE) 5 MG tablet  predniSONE (DELTASONE) 5 MG tablet  warfarin ANTICOAGULANT (COUMADIN) 5 MG tablet      Review of Systems  Per HPI   Physical Exam   BP: 136/63  Pulse: (!) 106  Temp:  98.6  F (37  C)  Resp: 18  SpO2: 97 %  Physical Exam  GENERAL APPEARANCE: healthy, alert and no distress  EYES: EOMI,  PERRL, conjunctiva clear  HENT: ear canals and TM's normal.  Nose and mouth without ulcers, erythema or lesions  NECK: supple, nontender, no lymphadenopathy  RESP: lungs clear to auscultation - no rales, rhonchi or wheezes  CV: regular rates and rhythm, mechanical click heard, no murmur noted  SKIN: no suspicious lesions or rashes on exposed areas of skin   ED Course        Procedures       Critical Care time:  none       Results for orders placed or performed during the hospital encounter of 01/23/25 (from the past 24 hours)   INR   Result Value Ref Range    INR 3.72 (H) 0.85 - 1.15     Medications - No data to display    Assessments & Plan (with Medical Decision Making)     I have reviewed the nursing notes.  I have reviewed the findings, diagnosis, plan and need for follow up with the patient.     Discharge Medication List as of 1/23/2025  1:42 PM        Final diagnoses:   Elevated INR     59-year-old female presents urgent care requesting INR check after entering inpatient treatment facility for EtOH abuse yesterday.  She had mild tachycardia upon arrival however heart rate had normalized at time of examination.  Physical exam findings significant for mechanical click consistent with her history of valve replacement.  She had INR which was supratherapeutic at 3.72.  Unfortunately patient is unsure what her current dose of medication has been reviewed he is little records only have 1 day available which showed she received 2 mg this morning.  She was discharged back to treatment facility with instructions to follow protocol for further dose adjustments, repeat follow up as needed. Worrisome reasons to return to ER/UC sooner discussed.     Disclaimer: This note consists of symbols derived from keyboarding, dictation, and/or voice recognition software. As a result, there may be errors in the script  that have gone undetected.  Please consider this when interpreting information found in the chart.    1/23/2025   [facility] EMERGENCY DEPT       Aubree Wells PA-C  01/23/25 1939

## (undated) DEVICE — Device

## (undated) DEVICE — LINEN TOWEL PACK X5 5464

## (undated) DEVICE — SU ETHIBOND 0 CT-1 CR 8X18" CX21D

## (undated) DEVICE — DRAIN CHEST TUBE RIGHT ANGLED 32FR 8132

## (undated) DEVICE — LEAD PACER MYOCARDIAL BIPOLAR TEMPORARY 53CM 6495F

## (undated) DEVICE — PACK OPEN HEART PV12OH524

## (undated) DEVICE — KIT WASH CELL SAVING ATL2001

## (undated) DEVICE — DEFIB PRO-PADZ LVP LQD GEL ADULT 8900-2105-01

## (undated) DEVICE — LINEN LEG ROLL 5489

## (undated) DEVICE — SOL NACL 0.9% IRRIG 1000ML BOTTLE 2F7124

## (undated) DEVICE — COVER TABLE POLY 65X90" 8186

## (undated) DEVICE — LINEN TOWEL PACK X6 WHITE 5487

## (undated) DEVICE — SU ETHIBOND 3-0 BBDA 36" X588H

## (undated) DEVICE — EVACUATOR BLADDER UROVAC LATEX M0067301250

## (undated) DEVICE — GLOVE PROTEXIS W/NEU-THERA 7.5  2D73TE75

## (undated) DEVICE — PREP CHLORAPREP W/ORANGE TINT 10.5ML 930715

## (undated) DEVICE — DRAIN SUMP INTRACARDIAC 20FR 12" POOL TIP 12112

## (undated) DEVICE — BONE WAX OSTENE 3.5GM CT410

## (undated) DEVICE — SU PROLENE 3-0 SHDA 36" 8522H

## (undated) DEVICE — NDL PERC ENTRY THINWALL 18GA 7.0" G00166

## (undated) DEVICE — INTRO SHEATH 7FRX10CM PINNACLE RSS702

## (undated) DEVICE — TOTE ANGIO CORP PC15AT SAN32CC83O

## (undated) DEVICE — SU VICRYL 3-0 FS-1 27" J442H

## (undated) DEVICE — SU PROLENE 4-0 SHDA 36" 8521H

## (undated) DEVICE — SU PLEDGET SOFT TFE 3/8"X3/26"X1/16" PCP40

## (undated) DEVICE — ESU GROUND PAD UNIVERSAL W/O CORD

## (undated) DEVICE — PACK MINI VAC CUSTOM CARDOPULMONARY BB5Z97R15

## (undated) DEVICE — PAD CHUX UNDERPAD 23X24" 7136

## (undated) DEVICE — DEVICE ASSEMBLY SUCTION/ANTI COAG BTC93

## (undated) DEVICE — CABLE MYO/LEAD PACING WHITE DISP 019-530

## (undated) DEVICE — INTRO SHEATH 4FRX10CM PINNACLE RSS402

## (undated) DEVICE — SU ETHIBOND 2-0 SHDA 30" X563H

## (undated) DEVICE — CANNULA PERFUSION AORTIC ROOT 22FR 20012

## (undated) DEVICE — DRAIN CHEST TUBE 32FR STR 8032

## (undated) DEVICE — LINEN TOWEL PACK X30 5481

## (undated) DEVICE — NDL INSUFFLATION 13GA 150MM C2202

## (undated) DEVICE — SUCTION CATH AIRLIFE TRI-FLO W/CONTROL PORT 14FR  T60C

## (undated) DEVICE — SOMASENSOR CEREBRAL OXIMETER ADULT SAFB-SM

## (undated) DEVICE — TOURNIQUET VASCULAR

## (undated) DEVICE — ESU ELEC BIPOLAR CUTTING LOOP EXT LENGTH 24/26FR 27040GP130-

## (undated) DEVICE — SU STEEL 6 CCS 4X18" M654G

## (undated) DEVICE — SU PROLENE 4-0 RB-1DA 36" 8557H

## (undated) DEVICE — SU SILK 1 TIE 10X30" SA87G

## (undated) DEVICE — SOL NACL 0.9% IRRIG 3000ML BAG 2B7477

## (undated) DEVICE — SPONGE PEANUT

## (undated) DEVICE — PACK TUBING MINI VAC CUSTOM 3/8X3/8T BB7V94R1

## (undated) DEVICE — MEDITRACE MULTIFUNTION ADULT RADIOTRANSPARENT ELECTRODE FOR ZOLL

## (undated) DEVICE — CANNULA PERFUSION 14FRX16" LEFT 12016

## (undated) DEVICE — SU ETHIBOND 2-0 V-5DA 10X30" PXX52

## (undated) DEVICE — RESERVOIR CELL SAVING BLOOD COLLECTION EL2120

## (undated) DEVICE — ELECTRODE COAGULATION BALL BIPOLAR 24FR

## (undated) DEVICE — DRAPE GYN/UROLOGY FLUID POUCH TUR 29455

## (undated) DEVICE — CATH DIAG 4FR JL 4.5 538417

## (undated) DEVICE — GLOVE BIOGEL PI ULTRATOUCH SZ 7.5 41175

## (undated) DEVICE — CANNULA PERFUSION ARTERIAL EOPA 18FR 12" 77418

## (undated) DEVICE — KIT HAND CONTROL ANGIOTOUCH ACIST 65CM AT-P65

## (undated) DEVICE — CABLE MYO/LEAD PACING BLUE DISP 019-535

## (undated) DEVICE — VALVE VRV 9156R2

## (undated) DEVICE — TUBING PERFUSION 1/4X1/16X8FT

## (undated) DEVICE — SU VICRYL 0 CTX 36" J370H

## (undated) DEVICE — SURGICEL HEMOSTAT 2X14" 1951

## (undated) DEVICE — SUCTION CANISTER MEDIVAC LINER 3000ML W/LID 65651-530

## (undated) DEVICE — PROTECTOR ARM ONE-STEP TRENDELENBURG 40418

## (undated) DEVICE — PACK TUR CUSTOM SBA15RUFSE

## (undated) DEVICE — CANNULA PERFUSION VENOUS 3 STAGE 29FR 15" 91429

## (undated) DEVICE — CATH ANGIO INFINITI 3DRC 4FRX100CM 538476

## (undated) DEVICE — CONNECTOR PERFUSION 3/8X3/8" W/O LL 6023

## (undated) DEVICE — BAG DRAIN URO FOR SIEMENS 8MM ADAPTER NS CC164NS-A

## (undated) RX ORDER — LIDOCAINE HYDROCHLORIDE 20 MG/ML
INJECTION, SOLUTION EPIDURAL; INFILTRATION; INTRACAUDAL; PERINEURAL
Status: DISPENSED
Start: 2021-08-19

## (undated) RX ORDER — PROPOFOL 10 MG/ML
INJECTION, EMULSION INTRAVENOUS
Status: DISPENSED
Start: 2024-10-30

## (undated) RX ORDER — CEFAZOLIN SODIUM 1 G/3ML
INJECTION, POWDER, FOR SOLUTION INTRAMUSCULAR; INTRAVENOUS
Status: DISPENSED
Start: 2021-08-19

## (undated) RX ORDER — ACETAMINOPHEN 325 MG/1
TABLET ORAL
Status: DISPENSED
Start: 2024-10-30

## (undated) RX ORDER — ESMOLOL HYDROCHLORIDE 10 MG/ML
INJECTION INTRAVENOUS
Status: DISPENSED
Start: 2024-10-30

## (undated) RX ORDER — FENTANYL CITRATE 50 UG/ML
INJECTION, SOLUTION INTRAMUSCULAR; INTRAVENOUS
Status: DISPENSED
Start: 2021-08-19

## (undated) RX ORDER — DEXAMETHASONE SODIUM PHOSPHATE 4 MG/ML
INJECTION, SOLUTION INTRA-ARTICULAR; INTRALESIONAL; INTRAMUSCULAR; INTRAVENOUS; SOFT TISSUE
Status: DISPENSED
Start: 2024-10-30

## (undated) RX ORDER — PROTAMINE SULFATE 10 MG/ML
INJECTION, SOLUTION INTRAVENOUS
Status: DISPENSED
Start: 2021-08-19

## (undated) RX ORDER — CEFAZOLIN SODIUM 2 G/100ML
INJECTION, SOLUTION INTRAVENOUS
Status: DISPENSED
Start: 2021-08-19

## (undated) RX ORDER — VANCOMYCIN HYDROCHLORIDE 1 G/200ML
INJECTION, SOLUTION INTRAVENOUS
Status: DISPENSED
Start: 2021-08-19

## (undated) RX ORDER — FENTANYL CITRATE 0.05 MG/ML
INJECTION, SOLUTION INTRAMUSCULAR; INTRAVENOUS
Status: DISPENSED
Start: 2021-08-19

## (undated) RX ORDER — PROPOFOL 10 MG/ML
INJECTION, EMULSION INTRAVENOUS
Status: DISPENSED
Start: 2021-08-19

## (undated) RX ORDER — PAPAVERINE HYDROCHLORIDE 30 MG/ML
INJECTION INTRAMUSCULAR; INTRAVENOUS
Status: DISPENSED
Start: 2021-08-19

## (undated) RX ORDER — FENTANYL CITRATE 50 UG/ML
INJECTION, SOLUTION INTRAMUSCULAR; INTRAVENOUS
Status: DISPENSED
Start: 2024-10-30

## (undated) RX ORDER — NEOSTIGMINE METHYLSULFATE 1 MG/ML
VIAL (ML) INJECTION
Status: DISPENSED
Start: 2021-08-19

## (undated) RX ORDER — CEFAZOLIN SODIUM/WATER 2 G/20 ML
SYRINGE (ML) INTRAVENOUS
Status: DISPENSED
Start: 2024-10-30

## (undated) RX ORDER — ONDANSETRON 2 MG/ML
INJECTION INTRAMUSCULAR; INTRAVENOUS
Status: DISPENSED
Start: 2024-10-30

## (undated) RX ORDER — VECURONIUM BROMIDE 1 MG/ML
INJECTION, POWDER, LYOPHILIZED, FOR SOLUTION INTRAVENOUS
Status: DISPENSED
Start: 2021-08-19

## (undated) RX ORDER — HEPARIN SODIUM 1000 [USP'U]/ML
INJECTION, SOLUTION INTRAVENOUS; SUBCUTANEOUS
Status: DISPENSED
Start: 2021-08-19

## (undated) RX ORDER — ETOMIDATE 2 MG/ML
INJECTION INTRAVENOUS
Status: DISPENSED
Start: 2021-08-19

## (undated) RX ORDER — GLYCOPYRROLATE 0.2 MG/ML
INJECTION, SOLUTION INTRAMUSCULAR; INTRAVENOUS
Status: DISPENSED
Start: 2021-08-19

## (undated) RX ORDER — ALBUTEROL SULFATE 90 UG/1
AEROSOL, METERED RESPIRATORY (INHALATION)
Status: DISPENSED
Start: 2021-08-19

## (undated) RX ORDER — MAGNESIUM SULFATE HEPTAHYDRATE 500 MG/ML
INJECTION, SOLUTION INTRAMUSCULAR; INTRAVENOUS
Status: DISPENSED
Start: 2021-08-19